# Patient Record
Sex: MALE | Race: BLACK OR AFRICAN AMERICAN | NOT HISPANIC OR LATINO | Employment: FULL TIME | URBAN - METROPOLITAN AREA
[De-identification: names, ages, dates, MRNs, and addresses within clinical notes are randomized per-mention and may not be internally consistent; named-entity substitution may affect disease eponyms.]

---

## 2017-07-10 VITALS — BODY MASS INDEX: 51.98 KG/M2 | HEIGHT: 65 IN | WEIGHT: 312 LBS

## 2017-07-10 RX ORDER — VALSARTAN 320 MG/1
1 TABLET ORAL DAILY
COMMUNITY
Start: 2016-10-13 | End: 2017-08-31 | Stop reason: SDUPTHER

## 2017-07-10 RX ORDER — ACETAMINOPHEN 500 MG
2 TABLET ORAL DAILY
COMMUNITY
Start: 2016-10-13 | End: 2018-12-13 | Stop reason: ALTCHOICE

## 2017-07-10 RX ORDER — AMLODIPINE BESYLATE 5 MG/1
1 TABLET ORAL DAILY
COMMUNITY
Start: 2016-10-13 | End: 2017-08-31 | Stop reason: SDUPTHER

## 2017-07-10 RX ORDER — TADALAFIL 5 MG/1
1 TABLET ORAL DAILY
COMMUNITY
Start: 2015-04-08 | End: 2018-03-17 | Stop reason: SDUPTHER

## 2017-07-10 RX ORDER — ALLOPURINOL 300 MG/1
1 TABLET ORAL DAILY
COMMUNITY
Start: 2016-10-13 | End: 2017-07-12 | Stop reason: SDUPTHER

## 2017-07-10 RX ORDER — ATORVASTATIN CALCIUM 20 MG/1
1 TABLET, FILM COATED ORAL NIGHTLY
COMMUNITY
Start: 2016-10-13 | End: 2017-08-31 | Stop reason: SDUPTHER

## 2017-07-12 DIAGNOSIS — M10.9 GOUT, UNSPECIFIED CAUSE, UNSPECIFIED CHRONICITY, UNSPECIFIED SITE: Primary | ICD-10-CM

## 2017-07-12 RX ORDER — ALLOPURINOL 300 MG/1
300 TABLET ORAL DAILY
Qty: 30 TABLET | Refills: 5 | Status: SHIPPED | OUTPATIENT
Start: 2017-07-12 | End: 2018-02-20 | Stop reason: SDUPTHER

## 2017-08-31 ENCOUNTER — OFFICE VISIT (OUTPATIENT)
Dept: FAMILY MEDICINE | Facility: CLINIC | Age: 47
End: 2017-08-31
Payer: COMMERCIAL

## 2017-08-31 VITALS
WEIGHT: 311 LBS | BODY MASS INDEX: 51.82 KG/M2 | DIASTOLIC BLOOD PRESSURE: 82 MMHG | HEIGHT: 65 IN | OXYGEN SATURATION: 97 % | HEART RATE: 98 BPM | SYSTOLIC BLOOD PRESSURE: 130 MMHG

## 2017-08-31 DIAGNOSIS — E78.01 FAMILIAL HYPERCHOLESTEROLEMIA: ICD-10-CM

## 2017-08-31 DIAGNOSIS — G47.9 SLEEP DISORDER: ICD-10-CM

## 2017-08-31 DIAGNOSIS — Z23 NEED FOR TETANUS BOOSTER: ICD-10-CM

## 2017-08-31 DIAGNOSIS — I10 ESSENTIAL HYPERTENSION: ICD-10-CM

## 2017-08-31 PROBLEM — E78.5 HYPERLIPIDEMIA: Status: ACTIVE | Noted: 2017-08-31

## 2017-08-31 PROCEDURE — 90715 TDAP VACCINE 7 YRS/> IM: CPT | Mod: ,,, | Performed by: FAMILY MEDICINE

## 2017-08-31 PROCEDURE — 90471 IMMUNIZATION ADMIN: CPT | Mod: ,,, | Performed by: FAMILY MEDICINE

## 2017-08-31 PROCEDURE — 3008F BODY MASS INDEX DOCD: CPT | Mod: ,,, | Performed by: FAMILY MEDICINE

## 2017-08-31 PROCEDURE — 99214 OFFICE O/P EST MOD 30 MIN: CPT | Mod: 25,,, | Performed by: FAMILY MEDICINE

## 2017-08-31 RX ORDER — AMLODIPINE BESYLATE 5 MG/1
5 TABLET ORAL DAILY
Qty: 30 TABLET | Refills: 6 | Status: SHIPPED | OUTPATIENT
Start: 2017-08-31 | End: 2018-03-08 | Stop reason: SDUPTHER

## 2017-08-31 RX ORDER — VALSARTAN 320 MG/1
320 TABLET ORAL DAILY
Qty: 30 TABLET | Refills: 6 | Status: SHIPPED | OUTPATIENT
Start: 2017-08-31 | End: 2018-03-08 | Stop reason: SDUPTHER

## 2017-08-31 RX ORDER — ATORVASTATIN CALCIUM 20 MG/1
20 TABLET, FILM COATED ORAL NIGHTLY
Qty: 30 TABLET | Refills: 6 | Status: SHIPPED | OUTPATIENT
Start: 2017-08-31 | End: 2018-03-08 | Stop reason: SDUPTHER

## 2017-08-31 NOTE — PROGRESS NOTES
Subjective:       Patient ID: Hi Andres Jr. is a 46 y.o. male.    Chief Complaint: Hypertension    Hypertension   This is a chronic problem. The current episode started more than 1 year ago. The problem is unchanged. The problem is controlled. Pertinent negatives include no blurred vision, chest pain, headaches, malaise/fatigue or shortness of breath. There are no associated agents to hypertension. Risk factors for coronary artery disease include dyslipidemia, male gender and obesity. Past treatments include angiotensin blockers. The current treatment provides mild improvement. There are no compliance problems.      Review of Systems   Constitutional: Negative for activity change, appetite change, fever and malaise/fatigue.   HENT: Negative for congestion and sore throat.    Eyes: Negative for blurred vision and visual disturbance.   Respiratory: Positive for apnea (heavy snoring witnessed, gasping). Negative for cough, choking, chest tightness and shortness of breath.    Cardiovascular: Negative for chest pain.   Gastrointestinal: Negative for abdominal pain, constipation, diarrhea and nausea.   Genitourinary: Negative for difficulty urinating.   Musculoskeletal: Negative for back pain and myalgias.   Neurological: Negative for headaches.   Hematological: Negative for adenopathy.   Psychiatric/Behavioral: Negative for suicidal ideas.       Past Medical History:   Diagnosis Date    Anemia     Arthritis     Hyperlipidemia     Hypertension     Vitamin D deficiency       Past Surgical History:   Procedure Laterality Date    HERNIA REPAIR         Family History   Problem Relation Age of Onset    Arthritis Father     Hypertension Father        Social History     Social History    Marital status:      Spouse name: N/A    Number of children: N/A    Years of education: N/A     Social History Main Topics    Smoking status: Never Smoker    Smokeless tobacco: Never Used    Alcohol use Yes       "Comment: monthly    Drug use: No    Sexual activity: Yes     Other Topics Concern    None     Social History Narrative    None       Current Outpatient Prescriptions   Medication Sig Dispense Refill    allopurinol (ZYLOPRIM) 300 MG tablet Take 1 tablet (300 mg total) by mouth once daily. 30 tablet 5    amlodipine (NORVASC) 5 MG tablet Take 1 tablet (5 mg total) by mouth Daily. 30 tablet 6    atorvastatin (LIPITOR) 20 MG tablet Take 1 tablet (20 mg total) by mouth nightly. 30 tablet 6    cholecalciferol, vitamin D3, 2,000 unit Cap Take 2 capsules by mouth once daily.      omega-3 fatty acids-fish oil (FISH OIL EXTRA STRENGTH) 435-880 mg Cap Take 1 capsule by mouth once daily.      tadalafil (CIALIS) 5 MG tablet Take 1 tablet by mouth once daily.      valsartan (DIOVAN) 320 MG tablet Take 1 tablet (320 mg total) by mouth Daily. Take with food 30 tablet 6     No current facility-administered medications for this visit.        Review of patient's allergies indicates:  No Known Allergies  Objective:      Blood pressure 130/82, pulse 98, height 5' 5" (1.651 m), weight (!) 141.1 kg (311 lb), SpO2 97 %. Body mass index is 51.75 kg/m².   Physical Exam   Constitutional: He is oriented to person, place, and time. He appears well-developed and well-nourished.   HENT:   Head: Atraumatic.   Eyes: EOM are normal. Pupils are equal, round, and reactive to light. Right pupil is round. Left pupil is round.   Neck: Normal range of motion. Neck supple. No thyromegaly present.   Cardiovascular: Normal rate and regular rhythm.    No murmur heard.  Pulmonary/Chest: Effort normal and breath sounds normal. No respiratory distress.   Abdominal: Soft. Bowel sounds are normal. There is no hepatosplenomegaly. There is no tenderness.   Musculoskeletal: Normal range of motion. He exhibits no edema.   Lymphadenopathy:     He has no cervical adenopathy.        Right: No supraclavicular adenopathy present.        Left: No supraclavicular " adenopathy present.   Neurological: He is alert and oriented to person, place, and time. He has normal strength. No cranial nerve deficit or sensory deficit.   Skin: Skin is warm and dry.   Psychiatric: He has a normal mood and affect. His behavior is normal. Judgment and thought content normal. He expresses no suicidal plans.           Assessment:       1. Essential hypertension    2. Familial hypercholesterolemia    3. Sleep disorder    4. Need for tetanus booster        Plan:       Hi was seen today for hypertension.    Diagnoses and all orders for this visit:    Essential hypertension - stable  -     TSH; Future  -     TSH  -     amlodipine (NORVASC) 5 MG tablet; Take 1 tablet (5 mg total) by mouth Daily.  -     valsartan (DIOVAN) 320 MG tablet; Take 1 tablet (320 mg total) by mouth Daily. Take with food    Familial hypercholesterolemia  -     Comprehensive metabolic panel; Future  -     Lipid panel; Future  -     Comprehensive metabolic panel  -     Lipid panel  -     atorvastatin (LIPITOR) 20 MG tablet; Take 1 tablet (20 mg total) by mouth nightly.    Sleep disorder  -     Ambulatory referral to Pulmonology    Need for tetanus booster  -     Tdap Vaccine

## 2017-08-31 NOTE — PATIENT INSTRUCTIONS
Heart Disease Education    The heart beats 60 to 100 times per minute, 24 hours a day. This equals almost 1000,000 times a day. It pumps blood with oxygen and nutrients to the tissues and organs of the body. But the heart is a muscle and needs its own supply of blood. Blood flow to the heart is supplied by the coronary arteries. Coronary artery disease (atherosclerosis) is a result of cholesterol, saturated fat, and calcium deposits (plaques) that build up inside the walls. This causes inflammation within the coronary arteries. These plaques narrow the artery and reduce blood flow to the heart muscle. The reduction in blood flow to the heart muscle decreases oxygen supply to the heart. If the narrowing is significant enough, the oxygen supply to one or more regions of the heart can be temporarily or permanently shut down. This can cause chest pain, and possibly death of heart tissue (heart attack).  Types of chest pain  Angina is the name for pain in the heart muscle. Angina is a warning sign of serious heart disease. When untreated it can lead to a heart attack, also known as acute myocardial infarction, or AMI. Angina occurs when there is not enough blood and oxygen flowing to the heart for the amount of work it is doing. This most often happens during physical exertion, when the heart is working hardest. It is usually relieved by rest or nitroglycerin. Angina may also occur after a large meal when extra blood is sent to the digestive organs and less goes to the heart. In the case of advanced or unstable heart disease, angina can occur at rest or awaken you from sleep. Angina usually lasts from a few minutes up to 20 minutes or more. When treated early, the effects of angina can be reversed without permanent damage to the heart. Angina is a serious condition and needs to be evaluated by a medical professional immediately.  There are two types of angina -- stable and unstable:  · Stable angina usually occurs  with a predictable level of activity. Being stable, its character, severity, and occurrence do not change much over time. It usually starts with activity, and resolves with rest or taking your medicine as instructed by your doctor. The symptoms usually do not last long.  · Unstable angina changes or gets worse over time. It is different from whatever you are used to. It may feel different or worse, begin without cause, occur with exercise or exertion, wake you up from sleep, and last longer. It may not respond in the same way as it does when you take your usual medicines for an attack. This type of angina can be a warning sign of an impending heart attack.     A heart attack is usually the result of a blood clot that suddenly forms in a coronary artery that has been narrowed with plaque. When this occurs, blood flow may be cut off to a part of the heart muscle, causing the cells to die. This weakens the pumping action of the heart, which affects the delivery of blood to all the other organs in the body including the brain. This damage is not reversible. However, early treatment can limit the amount of damage.  The pain you feel with angina and a heart attack may have a similar quality. However, it is usually different in intensity and duration. Here are some typical descriptions of a heart attack:  · It is most often experienced as a squeezing, crushing, pressure-like sensation in the center of the chest.  · It is sometimes described as something heavy sitting on my chest.  · It may feel more like a bad case of indigestion.  · The pain may spread from the chest to the arm, shoulder, throat or jaw.  · Sometimes the pain is not felt in the chest at all, but only in the arm, shoulder, throat or jaw.  · There may also be nausea, vomiting, dizziness or light-headedness, sweating and trouble breathing.  · Palpitations, or your heart beating rapidly  · A new, irregular heart beat  · Unexplained weakness  You may not be  "able to tell the difference between "bad" angina and a heart attack at home. Seek help if your symptoms are different than usual. Do not be in denial or just try to "tough it out."  Call 911  This is the fastest and safest way to get to the emergency department. The paramedics can also start treatment on the way to the hospital, saving valuable time for your heart.  · If the angina gets worse, if it continues, or if it stops and returns, call 911 immediately. Do not delay. You may be having a heart attack.  · After you call 911, take a second tablet or spray unless instructed otherwise. When repeating doses, sit down if possible, because it can make you feel lightheaded or dizzy. Wait another 5 minutes. If the angina still does not go away, take a third tablet or spray. Do not take more than 3 tablets or sprays within 15 minutes. Stay on the phone with 911 for further instruction.  · Your healthcare provider may give you slightly different instructions than those above. If so, follow them carefully.  Do not wait until symptoms become severe to call 911.  Other reasons to call 911 include:  · Trouble breathing  · Feeling lightheaded, faint, or dizzy  · Rapid heart beat  · Slower than usual heart rate compared to your normal  · Angina with weakness, dizziness, fainting, heavy sweating, nausea, or vomiting  · Extreme drowsiness, confusion  · Weakness of an arm or leg or one side of the face  · Difficulty with speech or vision  When to seek medical care  Remember, the signs and symptoms of a heart attack are not always like they are on TV. Sometimes they are not so obvious. You may only feel weak, or just not right. If it is not clear or if you have any doubt, call for advice.  · Seek help if there is a change in the type of pain, if it feels different, or if your symptoms are mild.  · Do not drive yourself. Have someone else drive you. If no one can drive, call 911.  · Do not delay. Fast diagnosis and treatment can " "prevent or limit the amount of heart damage during a heart attack.  · Do not go to your doctor's office or a clinic as they may not be able to provide all the testing and treatment required for this condition.  · If your doctor has given you medicine to take when symptoms occur, take them but don't delay getting help trying to locate medicines.  What happens in the emergency department  The emergency department is connected to your local emergency medical system (EMS) through 911. That's why during a cardiac emergency, calling 911 is the fastest way to get help. The goal of the emergency department is to rapidly screen, evaluate, and treat people.  Once you are there, an electrocardiogram (ECG or heart tracing) will be done. Blood samples may be taken to look for the presence of heart enzymes that leak from damaged heart cells and show if a heart attack is occurring. You will often be evaluated by a heart specialist (cardiologist) who decides the best course of action. In the case of severe angina or early heart attack, and depending on the circumstances, powerful "clot busting" medicines can be used to dissolve blood clots in the coronary artery. In other cases, you may be taken to a cardiac catheterization lab. Here, a tiny balloon-tipped catheter is advanced through blood vessels to the heart. There the balloon is inflated pushing open the blood vessel restoring blood flow.  Risk factors for heart disease  Risk factors for heart disease are a combination of genetic and lifestyle. Many risk factors work by either directly or indirectly damaging the blood vessels of the heart, or by increasing the risk of forming blood or cholesterol clots, which then clog up and block the arteries.     Examples of physical lifestyle risk factors:  · Cigarette smoking  · High blood pressure  · High blood cholesterol  · Use of stimulant drugs such as cocaine, crack, and amphetamines  · Eating a high-fat, high-cholesterol " meal  · Diabetes   · Obesity which increases risk for diabetes and high blood pressure  · Lack of regular physical activity     Examples of emotional lifestyle factors:  · Chronic high stress levels release stress hormones. These raise blood pressure and cholesterol level and makes blood clot more easily.  · Held-in anger, hostile or cynical attitude  · Social and emotional isolation, lack of intimacy  · Loss of relationship  · Depression  Other factors that increase the risk of heart attack that you cannot control :  · Age. The older you get beyond 40, the greater is your risk of significant coronary artery disease.  · Gender. More men than women get heart disease; but once past menopause, women who are not taking estrogen replacement have the same risk as men for a heart attack.  · Family history. If your mother, father, brother or sister has coronary artery disease, your risk of having it is higher than a person your age without this family history.  What can you do to decrease your risk  To reduce your risk of heart disease:  · Get regular checkups with your doctor.  · Take your medicines for blood pressure, cholesterol or diabetes as directed.  · Watch your diet. Eat a heart healthy diet choosing fresh foods, less salt, cholesterol, and fat  · Stop smoking. Get help if needed.  · Get regular exercise.  · Manage stress.  · Carry a list of medicines and doses in your wallet.  Date Last Reviewed: 12/30/2015  © 8145-5903 [employer]. [address] [place] [address], [place] [address]. All rights reserved. This information is not intended as a substitute for professional medical care. Always follow your healthcare professional's instructions.

## 2018-02-20 DIAGNOSIS — M10.9 GOUT, UNSPECIFIED CAUSE, UNSPECIFIED CHRONICITY, UNSPECIFIED SITE: ICD-10-CM

## 2018-02-20 RX ORDER — ALLOPURINOL 300 MG/1
TABLET ORAL
Qty: 30 TABLET | Refills: 5 | Status: SHIPPED | OUTPATIENT
Start: 2018-02-20 | End: 2018-09-26 | Stop reason: SDUPTHER

## 2018-02-22 ENCOUNTER — TELEPHONE (OUTPATIENT)
Dept: FAMILY MEDICINE | Facility: CLINIC | Age: 48
End: 2018-02-22

## 2018-02-22 DIAGNOSIS — E78.5 HYPERLIPIDEMIA, UNSPECIFIED HYPERLIPIDEMIA TYPE: Primary | ICD-10-CM

## 2018-02-22 NOTE — TELEPHONE ENCOUNTER
----- Message from Nirali Victoria sent at 2018  9:52 AM CST -----  Regarding: lab order request  Contact: [phone]  Patient has appointment on 18 and is requesting lab order request sent to [facility].  1969.  Thank you!

## 2018-03-07 ENCOUNTER — TELEPHONE (OUTPATIENT)
Dept: FAMILY MEDICINE | Facility: CLINIC | Age: 48
End: 2018-03-07

## 2018-03-07 LAB
ALBUMIN SERPL-MCNC: 4.3 G/DL (ref 3.5–5.5)
ALBUMIN/GLOB SERPL: 1.4 {RATIO} (ref 1.2–2.2)
ALP SERPL-CCNC: 115 IU/L (ref 39–117)
ALT SERPL-CCNC: 16 IU/L (ref 0–44)
AST SERPL-CCNC: 16 IU/L (ref 0–40)
BILIRUB SERPL-MCNC: 1 MG/DL (ref 0–1.2)
BUN SERPL-MCNC: 14 MG/DL (ref 6–24)
BUN/CREAT SERPL: 15 (ref 9–20)
CALCIUM SERPL-MCNC: 9.2 MG/DL (ref 8.7–10.2)
CHLORIDE SERPL-SCNC: 101 MMOL/L (ref 96–106)
CHOLEST SERPL-MCNC: 131 MG/DL (ref 100–199)
CO2 SERPL-SCNC: 23 MMOL/L (ref 18–29)
CREAT SERPL-MCNC: 0.94 MG/DL (ref 0.76–1.27)
GFR SERPLBLD CREATININE-BSD FMLA CKD-EPI: 110 ML/MIN/1.73
GFR SERPLBLD CREATININE-BSD FMLA CKD-EPI: 95 ML/MIN/1.73
GLOBULIN SER CALC-MCNC: 3 G/DL (ref 1.5–4.5)
GLUCOSE SERPL-MCNC: 88 MG/DL (ref 65–99)
HDLC SERPL-MCNC: 35 MG/DL
LDLC SERPL CALC-MCNC: 75 MG/DL (ref 0–99)
POTASSIUM SERPL-SCNC: 4.3 MMOL/L (ref 3.5–5.2)
PROT SERPL-MCNC: 7.3 G/DL (ref 6–8.5)
SODIUM SERPL-SCNC: 143 MMOL/L (ref 134–144)
TRIGL SERPL-MCNC: 104 MG/DL (ref 0–149)
VLDLC SERPL CALC-MCNC: 21 MG/DL (ref 5–40)

## 2018-03-08 ENCOUNTER — OFFICE VISIT (OUTPATIENT)
Dept: FAMILY MEDICINE | Facility: CLINIC | Age: 48
End: 2018-03-08
Payer: COMMERCIAL

## 2018-03-08 VITALS
HEIGHT: 65 IN | HEART RATE: 90 BPM | SYSTOLIC BLOOD PRESSURE: 150 MMHG | DIASTOLIC BLOOD PRESSURE: 96 MMHG | WEIGHT: 299 LBS | BODY MASS INDEX: 49.81 KG/M2 | OXYGEN SATURATION: 98 %

## 2018-03-08 DIAGNOSIS — E78.01 FAMILIAL HYPERCHOLESTEROLEMIA: ICD-10-CM

## 2018-03-08 DIAGNOSIS — I10 ESSENTIAL HYPERTENSION: Primary | ICD-10-CM

## 2018-03-08 PROBLEM — Z23 NEED FOR TETANUS BOOSTER: Status: RESOLVED | Noted: 2017-08-31 | Resolved: 2018-03-08

## 2018-03-08 PROCEDURE — 3080F DIAST BP >= 90 MM HG: CPT | Mod: ,,, | Performed by: FAMILY MEDICINE

## 2018-03-08 PROCEDURE — 99213 OFFICE O/P EST LOW 20 MIN: CPT | Mod: ,,, | Performed by: FAMILY MEDICINE

## 2018-03-08 PROCEDURE — 3077F SYST BP >= 140 MM HG: CPT | Mod: ,,, | Performed by: FAMILY MEDICINE

## 2018-03-08 RX ORDER — VALSARTAN 320 MG/1
320 TABLET ORAL DAILY
Qty: 30 TABLET | Refills: 6 | Status: SHIPPED | OUTPATIENT
Start: 2018-03-08 | End: 2018-11-14 | Stop reason: SDUPTHER

## 2018-03-08 RX ORDER — ATORVASTATIN CALCIUM 20 MG/1
20 TABLET, FILM COATED ORAL NIGHTLY
Qty: 30 TABLET | Refills: 6 | Status: SHIPPED | OUTPATIENT
Start: 2018-03-08 | End: 2018-11-14 | Stop reason: SDUPTHER

## 2018-03-08 NOTE — PATIENT INSTRUCTIONS
Taking Your Blood Pressure  Blood pressure is the force of blood against the artery wall as it moves from the heart through the blood vessels. You can take your own blood pressure reading using a digital monitor. Take your readings the same each time, using the same arm. Take readings as often as your healthcare provider instructs.  About blood pressure monitors  Blood pressure monitors are designed for certain ages and cases. You can find monitors for older adults, for pregnant women, and for children. Make sure the one you choose is the right one for your age and situation.  The American Heart Association recommends an automatic cuff monitor that fits on your upper arm (bicep). The cuff should fit your arm size. A cuff thats too large or too small will not give an accurate reading. Measure around your upper arm to find your size.  Monitors that attach to your finger or wrist are not as accurate as monitors for your upper arm.  Ask your healthcare provider for help in choosing a monitor. Bring your monitor to your next provider visit if you need help in using it the correct way.  The steps below are general instructions for using an automatic digital monitor.  Step 1. Relax    · Take your blood pressure at the same time every day, such as in the morning or evening, or at the time your healthcare provider recommends.  · Wait at least a half-hour after smoking, eating, or exercising. Don't drink coffee, tea, soda, or other caffeinated beverages before checking your blood pressure.  · Sit comfortably at a table with both feet on the floor. Do not cross your legs or feet. Place the monitor near you.  · Rest for a few minutes before you begin.  Step 2. Wrap the cuff    · Place your arm on the table, palm up. Your arm should be at the level of your heart. Wrap the cuff around your upper arm, just above your elbow. Its best done on bare skin, not over clothing. Most cuffs will indicate where the brachial artery (the  blood vessel in the middle of the arm at the inner side of the elbow) should line up with the cuff. Look in your monitor's instruction booklet for an illustration. You can also bring your cuff to your healthcare provider and have them show you how to correctly place the cuff.  Step 3. Inflate the cuff    · Push the button that starts the pump.  · The cuff will tighten, then loosen.  · The numbers will change. When they stop changing, your blood pressure reading will appear.  · Take 2 or 3 readings one minute apart.  Step 4. Write down the results of each reading    · Write down your blood pressure numbers for each reading. Note the date and time. Keep your results in one place, such as a notebook. Even if your monitor has a built-in memory, keep a hard copy of the readings.  · Remove the cuff from your arm. Turn off the machine.  · Bring your blood pressure records with your healthcare providers at each visit.  · If you start a new blood pressure medicine, note the day you started the new medicine. Also note the day if you change the dose of your medicine. This information goes on your blood pressure recording sheet. This will help your healthcare provider monitor how well the medicine changes are working.  · Ask your healthcare provider what numbers should prompt you to call him or her. Also ask what numbers should prompt you to get help right away.  Date Last Reviewed: 11/1/2016  © 6640-7827 [employer]. [address] [place] [address], [place] [address]. All rights reserved. This information is not intended as a substitute for professional medical care. Always follow your healthcare professional's instructions.

## 2018-03-08 NOTE — PROGRESS NOTES
Subjective:       Patient ID: Hi Andres Jr. is a 47 y.o. male.    Chief Complaint: Hypertension (discuss lab results)    Hypertension   This is a chronic problem. The current episode started more than 1 year ago. The problem is unchanged. The problem is uncontrolled. Pertinent negatives include no anxiety, chest pain, headaches, malaise/fatigue, peripheral edema, PND or shortness of breath. There are no associated agents to hypertension. Risk factors for coronary artery disease include male gender, dyslipidemia, family history, obesity and sedentary lifestyle.     Review of Systems   Constitutional: Negative for activity change, appetite change, fever and malaise/fatigue.   HENT: Negative for congestion and sore throat.    Eyes: Negative for visual disturbance.   Respiratory: Negative for cough, chest tightness and shortness of breath.    Cardiovascular: Negative for chest pain and PND.   Gastrointestinal: Negative for abdominal pain, constipation, diarrhea and nausea.   Genitourinary: Negative for difficulty urinating.   Musculoskeletal: Negative for back pain and myalgias.   Neurological: Negative for headaches.   Hematological: Negative for adenopathy.   Psychiatric/Behavioral: Negative for suicidal ideas.       Past Medical History:   Diagnosis Date    Anemia     Arthritis     Hyperlipidemia     Hypertension     Vitamin D deficiency       Past Surgical History:   Procedure Laterality Date    HERNIA REPAIR         Family History   Problem Relation Age of Onset    Arthritis Father     Hypertension Father        Social History     Social History    Marital status:      Spouse name: N/A    Number of children: N/A    Years of education: N/A     Social History Main Topics    Smoking status: Never Smoker    Smokeless tobacco: Never Used    Alcohol use Yes      Comment: monthly    Drug use: No    Sexual activity: Yes     Other Topics Concern    None     Social History Narrative    None  "      Current Outpatient Prescriptions   Medication Sig Dispense Refill    allopurinol (ZYLOPRIM) 300 MG tablet TAKE ONE TABLET BY MOUTH ONCE DAILY 30 tablet 5    amLODIPine (NORVASC) 10 MG Tab Take 1 tablet (10 mg total) by mouth Daily. 30 tablet 6    atorvastatin (LIPITOR) 20 MG tablet Take 1 tablet (20 mg total) by mouth nightly. 30 tablet 6    cholecalciferol, vitamin D3, 2,000 unit Cap Take 2 capsules by mouth once daily.      omega-3 fatty acids-fish oil (FISH OIL EXTRA STRENGTH) 435-880 mg Cap Take 1 capsule by mouth once daily.      tadalafil (CIALIS) 5 MG tablet Take 1 tablet by mouth once daily.      valsartan (DIOVAN) 320 MG tablet Take 1 tablet (320 mg total) by mouth Daily. Take with food 30 tablet 6     No current facility-administered medications for this visit.        Review of patient's allergies indicates:  No Known Allergies  Objective:    HPI     Hypertension    Additional comments: discuss lab results       Last edited by Niru Cole MA on 3/8/2018  9:34 AM. (History)      Blood pressure (!) 150/96, pulse 90, height 5' 5" (1.651 m), weight 135.6 kg (299 lb), SpO2 98 %. Body mass index is 49.76 kg/m².   Physical Exam   Constitutional: He is oriented to person, place, and time. He appears well-developed and well-nourished.   HENT:   Head: Atraumatic.   Eyes: EOM are normal. Pupils are equal, round, and reactive to light. Right pupil is round. Left pupil is round.   Neck: Normal range of motion. Neck supple. No thyromegaly present.   Cardiovascular: Normal rate and regular rhythm.    No murmur heard.  Pulmonary/Chest: Effort normal and breath sounds normal. No respiratory distress.   Abdominal: There is no hepatosplenomegaly. There is no tenderness.   Musculoskeletal: Normal range of motion. He exhibits no edema.   Lymphadenopathy:     He has no cervical adenopathy.        Right: No supraclavicular adenopathy present.        Left: No supraclavicular adenopathy present.   Neurological: He " is alert and oriented to person, place, and time. He has normal strength. No cranial nerve deficit or sensory deficit.   Skin: Skin is warm and dry.   Psychiatric: He has a normal mood and affect. His behavior is normal. Judgment and thought content normal. He expresses no suicidal plans.           Assessment:       1. Essential hypertension    2. Familial hypercholesterolemia        Plan:       Hi was seen today for hypertension.    Diagnoses and all orders for this visit:    Essential hypertension  -     amLODIPine (NORVASC) 10 MG Tab; Take 1 tablet (10 mg total) by mouth Daily.  -     valsartan (DIOVAN) 320 MG tablet; Take 1 tablet (320 mg total) by mouth Daily. Take with food    Familial hypercholesterolemia  -     atorvastatin (LIPITOR) 20 MG tablet; Take 1 tablet (20 mg total) by mouth nightly.    Andreas 2m

## 2018-03-19 RX ORDER — TADALAFIL 5 MG/1
TABLET, FILM COATED ORAL
Qty: 15 TABLET | Refills: 13 | Status: SHIPPED | OUTPATIENT
Start: 2018-03-19 | End: 2020-10-09 | Stop reason: SDUPTHER

## 2018-05-10 ENCOUNTER — OFFICE VISIT (OUTPATIENT)
Dept: FAMILY MEDICINE | Facility: CLINIC | Age: 48
End: 2018-05-10
Payer: COMMERCIAL

## 2018-05-10 VITALS
DIASTOLIC BLOOD PRESSURE: 80 MMHG | OXYGEN SATURATION: 97 % | BODY MASS INDEX: 49.81 KG/M2 | HEART RATE: 127 BPM | WEIGHT: 299 LBS | HEIGHT: 65 IN | SYSTOLIC BLOOD PRESSURE: 139 MMHG

## 2018-05-10 DIAGNOSIS — E66.01 CLASS 3 SEVERE OBESITY DUE TO EXCESS CALORIES WITH SERIOUS COMORBIDITY AND BODY MASS INDEX (BMI) OF 45.0 TO 49.9 IN ADULT: ICD-10-CM

## 2018-05-10 DIAGNOSIS — E78.01 FAMILIAL HYPERCHOLESTEROLEMIA: ICD-10-CM

## 2018-05-10 DIAGNOSIS — I10 ESSENTIAL HYPERTENSION: Primary | ICD-10-CM

## 2018-05-10 PROCEDURE — 3008F BODY MASS INDEX DOCD: CPT | Mod: ,,, | Performed by: FAMILY MEDICINE

## 2018-05-10 PROCEDURE — 3079F DIAST BP 80-89 MM HG: CPT | Mod: ,,, | Performed by: FAMILY MEDICINE

## 2018-05-10 PROCEDURE — 99213 OFFICE O/P EST LOW 20 MIN: CPT | Mod: ,,, | Performed by: FAMILY MEDICINE

## 2018-05-10 PROCEDURE — 3075F SYST BP GE 130 - 139MM HG: CPT | Mod: ,,, | Performed by: FAMILY MEDICINE

## 2018-05-10 NOTE — PROGRESS NOTES
Subjective:       Patient ID: Hi Andres Jr. is a 47 y.o. male.    Chief Complaint: Hypertension    Hypertension   This is a chronic problem. The current episode started more than 1 year ago. The problem is unchanged. The problem is controlled. Pertinent negatives include no anxiety, chest pain, headaches or shortness of breath. There are no associated agents to hypertension.     Review of Systems   Constitutional: Negative for activity change, appetite change and fever.   HENT: Negative for congestion and sore throat.    Eyes: Negative for visual disturbance.   Respiratory: Negative for cough, chest tightness and shortness of breath.    Cardiovascular: Negative for chest pain.   Gastrointestinal: Negative for abdominal pain, constipation, diarrhea and nausea.   Genitourinary: Negative for difficulty urinating.   Musculoskeletal: Negative for back pain and myalgias.   Neurological: Negative for headaches.   Hematological: Negative for adenopathy.   Psychiatric/Behavioral: Negative for suicidal ideas.       Past Medical History:   Diagnosis Date    Anemia     Arthritis     Hyperlipidemia     Hypertension     Vitamin D deficiency       Past Surgical History:   Procedure Laterality Date    HERNIA REPAIR         Family History   Problem Relation Age of Onset    Arthritis Father     Hypertension Father        Social History     Social History    Marital status:      Spouse name: N/A    Number of children: N/A    Years of education: N/A     Social History Main Topics    Smoking status: Never Smoker    Smokeless tobacco: Never Used    Alcohol use Yes      Comment: monthly    Drug use: No    Sexual activity: Yes     Other Topics Concern    None     Social History Narrative    None       Current Outpatient Prescriptions   Medication Sig Dispense Refill    allopurinol (ZYLOPRIM) 300 MG tablet TAKE ONE TABLET BY MOUTH ONCE DAILY 30 tablet 5    amLODIPine (NORVASC) 10 MG Tab Take 1 tablet (10  "mg total) by mouth Daily. 30 tablet 6    atorvastatin (LIPITOR) 20 MG tablet Take 1 tablet (20 mg total) by mouth nightly. 30 tablet 6    cholecalciferol, vitamin D3, 2,000 unit Cap Take 2 capsules by mouth once daily.      CIALIS 5 mg tablet TAKE ONE TABLET BY MOUTH ONCE DAILY 15 tablet 13    omega-3 fatty acids-fish oil (FISH OIL EXTRA STRENGTH) 435-880 mg Cap Take 1 capsule by mouth once daily.      valsartan (DIOVAN) 320 MG tablet Take 1 tablet (320 mg total) by mouth Daily. Take with food 30 tablet 6     No current facility-administered medications for this visit.        Review of patient's allergies indicates:  No Known Allergies  Objective:      Blood pressure 139/80, pulse (!) 127, height 5' 5" (1.651 m), weight 135.6 kg (299 lb), SpO2 97 %. Body mass index is 49.76 kg/m².   Physical Exam   Constitutional: He is oriented to person, place, and time. He appears well-developed and well-nourished.   HENT:   Head: Atraumatic.   Eyes: EOM are normal. Pupils are equal, round, and reactive to light. Right pupil is round. Left pupil is round.   Neck: Normal range of motion. Neck supple. No thyromegaly present.   Cardiovascular: Normal rate and regular rhythm.    No murmur heard.  Pulmonary/Chest: Effort normal and breath sounds normal. No respiratory distress.   Abdominal: There is no hepatosplenomegaly. There is no tenderness.   Musculoskeletal: Normal range of motion. He exhibits no edema.   Lymphadenopathy:     He has no cervical adenopathy.        Right: No supraclavicular adenopathy present.        Left: No supraclavicular adenopathy present.   Neurological: He is alert and oriented to person, place, and time. He has normal strength. No cranial nerve deficit or sensory deficit.   Skin: Skin is warm and dry.   Psychiatric: He has a normal mood and affect. His behavior is normal. Judgment and thought content normal. He expresses no suicidal plans.           Assessment:       1. Essential hypertension    2. " Familial hypercholesterolemia    3. Class 3 severe obesity due to excess calories with serious comorbidity and body mass index (BMI) of 45.0 to 49.9 in adult        Plan:       Hi was seen today for hypertension.    Diagnoses and all orders for this visit:    Essential hypertension  Comments:  stable    Familial hypercholesterolemia  Comments:  stable    The patient is asked to make an attempt to improve diet and exercise patterns to aid in medical management of this problem.  Andreas 6m

## 2018-09-26 DIAGNOSIS — M10.9 GOUT, UNSPECIFIED CAUSE, UNSPECIFIED CHRONICITY, UNSPECIFIED SITE: ICD-10-CM

## 2018-09-26 RX ORDER — ALLOPURINOL 300 MG/1
TABLET ORAL
Qty: 30 TABLET | Refills: 5 | Status: SHIPPED | OUTPATIENT
Start: 2018-09-26 | End: 2018-11-14 | Stop reason: SDUPTHER

## 2018-11-02 ENCOUNTER — TELEPHONE (OUTPATIENT)
Dept: FAMILY MEDICINE | Facility: CLINIC | Age: 48
End: 2018-11-02

## 2018-11-02 DIAGNOSIS — E78.5 HYPERLIPIDEMIA, UNSPECIFIED HYPERLIPIDEMIA TYPE: Primary | ICD-10-CM

## 2018-11-02 NOTE — TELEPHONE ENCOUNTER
----- Message from Niru Cole MA sent at 11/2/2018 11:18 AM CDT -----      ----- Message -----  From: Ayse Jaime  Sent: 11/2/2018  10:55 AM  To: Luis Camacho Staff    6m fu BP bw  [facility]

## 2018-11-13 LAB
ALBUMIN SERPL-MCNC: 4.2 G/DL (ref 3.5–5.5)
ALBUMIN/GLOB SERPL: 1.6 {RATIO} (ref 1.2–2.2)
ALP SERPL-CCNC: 111 IU/L (ref 39–117)
ALT SERPL-CCNC: 22 IU/L (ref 0–44)
AST SERPL-CCNC: 24 IU/L (ref 0–40)
BILIRUB SERPL-MCNC: 0.7 MG/DL (ref 0–1.2)
BUN SERPL-MCNC: 11 MG/DL (ref 6–24)
BUN/CREAT SERPL: 13 (ref 9–20)
CALCIUM SERPL-MCNC: 9.2 MG/DL (ref 8.7–10.2)
CHLORIDE SERPL-SCNC: 103 MMOL/L (ref 96–106)
CHOLEST SERPL-MCNC: 169 MG/DL (ref 100–199)
CO2 SERPL-SCNC: 21 MMOL/L (ref 20–29)
CREAT SERPL-MCNC: 0.88 MG/DL (ref 0.76–1.27)
EGFR IF AFRICAN AMERICAN: 117 ML/MIN/1.73
EST. GFR  (NON AFRICAN AMERICAN): 101 ML/MIN/1.73
GLOBULIN SER CALC-MCNC: 2.7 G/DL (ref 1.5–4.5)
GLUCOSE SERPL-MCNC: 85 MG/DL (ref 65–99)
HDLC SERPL-MCNC: 42 MG/DL
LDLC SERPL CALC-MCNC: 103 MG/DL (ref 0–99)
POTASSIUM SERPL-SCNC: 4.6 MMOL/L (ref 3.5–5.2)
PROT SERPL-MCNC: 6.9 G/DL (ref 6–8.5)
SODIUM SERPL-SCNC: 142 MMOL/L (ref 134–144)
TRIGL SERPL-MCNC: 119 MG/DL (ref 0–149)
VLDLC SERPL CALC-MCNC: 24 MG/DL (ref 5–40)

## 2018-11-14 ENCOUNTER — OFFICE VISIT (OUTPATIENT)
Dept: FAMILY MEDICINE | Facility: CLINIC | Age: 48
End: 2018-11-14
Payer: COMMERCIAL

## 2018-11-14 VITALS
OXYGEN SATURATION: 97 % | TEMPERATURE: 98 F | SYSTOLIC BLOOD PRESSURE: 150 MMHG | BODY MASS INDEX: 49.65 KG/M2 | HEIGHT: 65 IN | DIASTOLIC BLOOD PRESSURE: 92 MMHG | HEART RATE: 102 BPM | WEIGHT: 298 LBS

## 2018-11-14 DIAGNOSIS — I10 ESSENTIAL HYPERTENSION: Primary | ICD-10-CM

## 2018-11-14 DIAGNOSIS — E78.01 FAMILIAL HYPERCHOLESTEROLEMIA: ICD-10-CM

## 2018-11-14 DIAGNOSIS — D50.9 IRON DEFICIENCY ANEMIA, UNSPECIFIED IRON DEFICIENCY ANEMIA TYPE: ICD-10-CM

## 2018-11-14 DIAGNOSIS — M10.9 GOUT, UNSPECIFIED CAUSE, UNSPECIFIED CHRONICITY, UNSPECIFIED SITE: ICD-10-CM

## 2018-11-14 DIAGNOSIS — I10 ESSENTIAL HYPERTENSION: ICD-10-CM

## 2018-11-14 DIAGNOSIS — E78.5 HYPERLIPIDEMIA, UNSPECIFIED HYPERLIPIDEMIA TYPE: ICD-10-CM

## 2018-11-14 DIAGNOSIS — E55.9 VITAMIN D DEFICIENCY: ICD-10-CM

## 2018-11-14 PROBLEM — M19.90 OSTEOARTHRITIS: Status: ACTIVE | Noted: 2018-11-14

## 2018-11-14 PROBLEM — N52.9 ED (ERECTILE DYSFUNCTION) OF ORGANIC ORIGIN: Status: ACTIVE | Noted: 2018-11-14

## 2018-11-14 PROCEDURE — 3080F DIAST BP >= 90 MM HG: CPT | Mod: ,,, | Performed by: INTERNAL MEDICINE

## 2018-11-14 PROCEDURE — 99213 OFFICE O/P EST LOW 20 MIN: CPT | Mod: ,,, | Performed by: INTERNAL MEDICINE

## 2018-11-14 PROCEDURE — 3077F SYST BP >= 140 MM HG: CPT | Mod: ,,, | Performed by: INTERNAL MEDICINE

## 2018-11-14 PROCEDURE — 3008F BODY MASS INDEX DOCD: CPT | Mod: ,,, | Performed by: INTERNAL MEDICINE

## 2018-11-14 RX ORDER — AMLODIPINE BESYLATE 10 MG/1
10 TABLET ORAL NIGHTLY
Qty: 90 TABLET | Refills: 1 | Status: SHIPPED | OUTPATIENT
Start: 2018-11-14 | End: 2018-12-13 | Stop reason: SDUPTHER

## 2018-11-14 RX ORDER — METOPROLOL SUCCINATE 50 MG/1
50 TABLET, EXTENDED RELEASE ORAL DAILY
Qty: 90 TABLET | Refills: 1 | Status: SHIPPED | OUTPATIENT
Start: 2018-11-14 | End: 2019-06-17 | Stop reason: SDUPTHER

## 2018-11-14 RX ORDER — VALSARTAN 320 MG/1
320 TABLET ORAL DAILY
Qty: 90 TABLET | Refills: 1 | Status: SHIPPED | OUTPATIENT
Start: 2018-11-14 | End: 2019-08-28 | Stop reason: SDUPTHER

## 2018-11-14 RX ORDER — ATORVASTATIN CALCIUM 20 MG/1
20 TABLET, FILM COATED ORAL NIGHTLY
Qty: 90 TABLET | Refills: 1 | Status: SHIPPED | OUTPATIENT
Start: 2018-11-14 | End: 2019-06-04 | Stop reason: SDUPTHER

## 2018-11-14 RX ORDER — ALLOPURINOL 300 MG/1
300 TABLET ORAL DAILY
Qty: 90 TABLET | Refills: 1 | Status: SHIPPED | OUTPATIENT
Start: 2018-11-14 | End: 2019-08-28 | Stop reason: SDUPTHER

## 2018-11-14 NOTE — PROGRESS NOTES
Subjective:       Patient ID: Hi Andres is a 48 y.o. male.    Chief Complaint: Hypertension (continuity of care); Hyperlipidemia; and Follow-up (followup labs)    Hypertension   This is a chronic problem. The problem is uncontrolled. Pertinent negatives include no chest pain, headaches, neck pain, palpitations, peripheral edema or shortness of breath. Risk factors for coronary artery disease include dyslipidemia, obesity and male gender. Past treatments include angiotensin blockers and calcium channel blockers. There are no compliance problems (except hasn't taken meds yet this AM).  There is no history of angina, kidney disease, CAD/MI, CVA or heart failure.     Review of Systems   Constitutional: Negative for chills, fatigue, fever and unexpected weight change.   HENT: Negative for congestion, hearing loss, postnasal drip, rhinorrhea, trouble swallowing and voice change.    Eyes: Negative for photophobia and visual disturbance.   Respiratory: Positive for apnea. Negative for cough, choking, chest tightness, shortness of breath and wheezing.    Cardiovascular: Negative for chest pain, palpitations and leg swelling.   Gastrointestinal: Negative for abdominal pain, blood in stool, constipation, diarrhea, nausea, rectal pain and vomiting.   Endocrine: Positive for cold intolerance. Negative for heat intolerance, polydipsia and polyuria.   Genitourinary: Negative for decreased urine volume, difficulty urinating, discharge, dysuria, flank pain, frequency, genital sores, hematuria, testicular pain and urgency.   Musculoskeletal: Positive for arthralgias and joint swelling. Negative for back pain, gait problem, myalgias and neck pain.   Skin: Negative for color change, rash and wound.   Allergic/Immunologic: Negative for environmental allergies and food allergies.   Neurological: Negative for dizziness, tremors, seizures, syncope, facial asymmetry, speech difficulty, weakness, light-headedness, numbness and  headaches.   Hematological: Negative for adenopathy. Does not bruise/bleed easily.   Psychiatric/Behavioral: Negative for confusion, hallucinations, sleep disturbance and suicidal ideas. The patient is not nervous/anxious.        Past Medical History:   Diagnosis Date    Anemia     Arthritis     Gout     Hyperlipidemia     Hypertension     EVGENY on CPAP     Vitamin D deficiency       Past Surgical History:   Procedure Laterality Date    HERNIA REPAIR         Family History   Problem Relation Age of Onset    Arthritis Father     Hypertension Father     No Known Problems Mother     Prostate cancer Neg Hx     Colon cancer Neg Hx        Social History     Socioeconomic History    Marital status:      Spouse name: None    Number of children: None    Years of education: None    Highest education level: None   Social Needs    Financial resource strain: None    Food insecurity - worry: None    Food insecurity - inability: None    Transportation needs - medical: None    Transportation needs - non-medical: None   Occupational History    None   Tobacco Use    Smoking status: Never Smoker    Smokeless tobacco: Never Used   Substance and Sexual Activity    Alcohol use: Yes     Frequency: Monthly or less     Drinks per session: 1 or 2     Comment: monthly    Drug use: No    Sexual activity: Yes     Partners: Female   Other Topics Concern    None   Social History Narrative    Live with wife       Current Outpatient Medications   Medication Sig Dispense Refill    allopurinol (ZYLOPRIM) 300 MG tablet Take 1 tablet (300 mg total) by mouth once daily. 90 tablet 1    atorvastatin (LIPITOR) 20 MG tablet Take 1 tablet (20 mg total) by mouth nightly. 90 tablet 1    cholecalciferol, vitamin D3, 2,000 unit Cap Take 2 capsules by mouth once daily.      CIALIS 5 mg tablet TAKE ONE TABLET BY MOUTH ONCE DAILY 15 tablet 13    omega-3 fatty acids-fish oil (FISH OIL EXTRA STRENGTH) 435-880 mg Cap Take 1  "capsule by mouth once daily.      valsartan (DIOVAN) 320 MG tablet Take 1 tablet (320 mg total) by mouth Daily. Take with food 90 tablet 1    amLODIPine (NORVASC) 10 MG tablet Take 1 tablet (10 mg total) by mouth every evening. 90 tablet 1    metoprolol succinate (TOPROL-XL) 50 MG 24 hr tablet Take 1 tablet (50 mg total) by mouth once daily. 90 tablet 1     No current facility-administered medications for this visit.        Review of patient's allergies indicates:  No Known Allergies  Objective:    HPI     Hypertension      Additional comments: continuity of care              Follow-up      Additional comments: followup labs          Last edited by Jurgen Malhotra MA on 11/14/2018  8:47 AM. (History)      Blood pressure (!) 150/92, pulse 102, temperature 97.8 °F (36.6 °C), temperature source Temporal, height 5' 5" (1.651 m), weight 135.2 kg (298 lb), SpO2 97 %. Body mass index is 49.59 kg/m².   Physical Exam   Constitutional: He appears well-developed. No distress.   Obese     HENT:   Nose: Nose normal.   Mouth/Throat: Oropharynx is clear and moist.   Eyes: Conjunctivae are normal. Right eye exhibits no discharge. Left eye exhibits no discharge. No scleral icterus.   Neck: Carotid bruit is not present.   Cardiovascular: Normal rate, regular rhythm and normal heart sounds.   No murmur heard.  Pulmonary/Chest: Effort normal and breath sounds normal. No respiratory distress. He has no decreased breath sounds. He has no wheezes. He has no rhonchi. He has no rales.   Abdominal: Soft. He exhibits no distension. There is no tenderness. There is no rebound and no guarding. A hernia (umbilical) is present.   Musculoskeletal: He exhibits no edema.   Neurological: He is alert. He displays no tremor.   Skin: Skin is warm and dry.   Hyperpigmentation of posterior distal LE bilaterally     Psychiatric: He has a normal mood and affect. His speech is normal.   Nursing note and vitals reviewed.        Telephone on 11/02/2018 "   Component Date Value Ref Range Status    Cholesterol 11/12/2018 169  100 - 199 mg/dL Final    Triglycerides 11/12/2018 119  0 - 149 mg/dL Final    HDL 11/12/2018 42  >39 mg/dL Final    VLDL Cholesterol Phillip 11/12/2018 24  5 - 40 mg/dL Final    LDL Calculated 11/12/2018 103* 0 - 99 mg/dL Final    Glucose 11/12/2018 85  65 - 99 mg/dL Final    BUN, Bld 11/12/2018 11  6 - 24 mg/dL Final    Creatinine 11/12/2018 0.88  0.76 - 1.27 mg/dL Final    eGFR if non African American 11/12/2018 101  >59 mL/min/1.73 Final    eGFR if African American 11/12/2018 117  >59 mL/min/1.73 Final    BUN/Creatinine Ratio 11/12/2018 13  9 - 20 Final    Sodium 11/12/2018 142  134 - 144 mmol/L Final    Potassium 11/12/2018 4.6  3.5 - 5.2 mmol/L Final    Chloride 11/12/2018 103  96 - 106 mmol/L Final    CO2 11/12/2018 21  20 - 29 mmol/L Final    Calcium 11/12/2018 9.2  8.7 - 10.2 mg/dL Final    Total Protein 11/12/2018 6.9  6.0 - 8.5 g/dL Final    Albumin 11/12/2018 4.2  3.5 - 5.5 g/dL Final    Globulin, Total 11/12/2018 2.7  1.5 - 4.5 g/dL Final    Albumin/Globulin Ratio 11/12/2018 1.6  1.2 - 2.2 Final    Total Bilirubin 11/12/2018 0.7  0.0 - 1.2 mg/dL Final    Alkaline Phosphatase 11/12/2018 111  39 - 117 IU/L Final    AST 11/12/2018 24  0 - 40 IU/L Final    ALT 11/12/2018 22  0 - 44 IU/L Final   ]  Assessment:       1. Essential hypertension    2. Gout, unspecified cause, unspecified chronicity, unspecified site    3. Hyperlipidemia, unspecified hyperlipidemia type    4. Iron deficiency anemia, unspecified iron deficiency anemia type    5. Vitamin D deficiency    6. Essential hypertension    7. Familial hypercholesterolemia        Plan:       Hi was seen today for hypertension, hyperlipidemia and follow-up.    Diagnoses and all orders for this visit:    Essential hypertension  Comments:  Even though he hasn't had meds yet today, previous readings have been ULN; will add beta blocker.  Take amlodipine at  night.  Orders:  -     amLODIPine (NORVASC) 10 MG tablet; Take 1 tablet (10 mg total) by mouth every evening.  -     valsartan (DIOVAN) 320 MG tablet; Take 1 tablet (320 mg total) by mouth Daily. Take with food  -     metoprolol succinate (TOPROL-XL) 50 MG 24 hr tablet; Take 1 tablet (50 mg total) by mouth once daily.    Gout, unspecified cause, unspecified chronicity, unspecified site  -     allopurinol (ZYLOPRIM) 300 MG tablet; Take 1 tablet (300 mg total) by mouth once daily.  -     Uric acid; Future  -     Uric acid    Hyperlipidemia, unspecified hyperlipidemia type    Iron deficiency anemia, unspecified iron deficiency anemia type  -     CBC auto differential; Future  -     CBC auto differential    Vitamin D deficiency  -     Vitamin D; Future  -     Vitamin D    Essential hypertension  -     amLODIPine (NORVASC) 10 MG tablet; Take 1 tablet (10 mg total) by mouth every evening.  -     valsartan (DIOVAN) 320 MG tablet; Take 1 tablet (320 mg total) by mouth Daily. Take with food  -     metoprolol succinate (TOPROL-XL) 50 MG 24 hr tablet; Take 1 tablet (50 mg total) by mouth once daily.    Familial hypercholesterolemia  -     atorvastatin (LIPITOR) 20 MG tablet; Take 1 tablet (20 mg total) by mouth nightly.

## 2018-11-14 NOTE — PATIENT INSTRUCTIONS
Your overall health would benefit from weight loss.  There are many different systems out there to achieve weight loss but they are often hard to stick with over long periods of time.  I try to keep it simple.  Try and do some form of exercise, even if it is just walking, 30 minutes per day.  Try to limit your sugar intake by cutting back on soft drinks, sweet tea, desserts, snacks, etc.  Drink more water, especially while preparing a meal and with a meal.  This occupies space in your stomach and makes you feel full faster so you eat less.  Put less food on the plate; most of us eat double or triple the recommended serving sizes.  Even if you are eating the right things, you could still be eating too much.  Don't go back for seconds immediately.  There is a delay between your stomach and brain of about 20-30 minutes.  You may still feel hungry even though you really have had enough to eat.  Let your brain catch up to your stomach.  At the end of the day, it doesn't matter which system you use, it's about calories in and calories out.

## 2018-12-12 LAB
25(OH)D3+25(OH)D2 SERPL-MCNC: 27.7 NG/ML (ref 30–100)
BASOPHILS # BLD AUTO: 0 X10E3/UL (ref 0–0.2)
BASOPHILS NFR BLD AUTO: 0 %
EOSINOPHIL # BLD AUTO: 0.2 X10E3/UL (ref 0–0.4)
EOSINOPHIL NFR BLD AUTO: 2 %
ERYTHROCYTE [DISTWIDTH] IN BLOOD BY AUTOMATED COUNT: 13.9 % (ref 12.3–15.4)
HCT VFR BLD AUTO: 37.7 % (ref 37.5–51)
HGB BLD-MCNC: 12 G/DL (ref 13–17.7)
IMM GRANULOCYTES # BLD: 0 X10E3/UL (ref 0–0.1)
IMM GRANULOCYTES NFR BLD: 0 %
LYMPHOCYTES # BLD AUTO: 1.6 X10E3/UL (ref 0.7–3.1)
LYMPHOCYTES NFR BLD AUTO: 20 %
MCH RBC QN AUTO: 28.7 PG (ref 26.6–33)
MCHC RBC AUTO-ENTMCNC: 31.8 G/DL (ref 31.5–35.7)
MCV RBC AUTO: 90 FL (ref 79–97)
MONOCYTES # BLD AUTO: 0.8 X10E3/UL (ref 0.1–0.9)
MONOCYTES NFR BLD AUTO: 9 %
NEUTROPHILS # BLD AUTO: 5.6 X10E3/UL (ref 1.4–7)
NEUTROPHILS NFR BLD AUTO: 69 %
PLATELET # BLD AUTO: 380 X10E3/UL (ref 150–379)
RBC # BLD AUTO: 4.18 X10E6/UL (ref 4.14–5.8)
URATE SERPL-MCNC: 4.3 MG/DL (ref 3.7–8.6)
WBC # BLD AUTO: 8.2 X10E3/UL (ref 3.4–10.8)

## 2018-12-13 ENCOUNTER — OFFICE VISIT (OUTPATIENT)
Dept: FAMILY MEDICINE | Facility: CLINIC | Age: 48
End: 2018-12-13
Payer: COMMERCIAL

## 2018-12-13 VITALS
SYSTOLIC BLOOD PRESSURE: 112 MMHG | HEIGHT: 65 IN | BODY MASS INDEX: 49.65 KG/M2 | WEIGHT: 298 LBS | TEMPERATURE: 98 F | HEART RATE: 81 BPM | DIASTOLIC BLOOD PRESSURE: 76 MMHG | OXYGEN SATURATION: 95 %

## 2018-12-13 DIAGNOSIS — E55.9 VITAMIN D DEFICIENCY: Primary | ICD-10-CM

## 2018-12-13 DIAGNOSIS — Z23 NEED FOR PROPHYLACTIC VACCINATION AND INOCULATION AGAINST INFLUENZA: ICD-10-CM

## 2018-12-13 DIAGNOSIS — I10 ESSENTIAL HYPERTENSION: ICD-10-CM

## 2018-12-13 PROCEDURE — 99213 OFFICE O/P EST LOW 20 MIN: CPT | Mod: 25,,, | Performed by: INTERNAL MEDICINE

## 2018-12-13 PROCEDURE — 90686 IIV4 VACC NO PRSV 0.5 ML IM: CPT | Mod: ,,, | Performed by: INTERNAL MEDICINE

## 2018-12-13 PROCEDURE — 3008F BODY MASS INDEX DOCD: CPT | Mod: ,,, | Performed by: INTERNAL MEDICINE

## 2018-12-13 PROCEDURE — 90471 IMMUNIZATION ADMIN: CPT | Mod: ,,, | Performed by: INTERNAL MEDICINE

## 2018-12-13 PROCEDURE — 3078F DIAST BP <80 MM HG: CPT | Mod: ,,, | Performed by: INTERNAL MEDICINE

## 2018-12-13 PROCEDURE — 3074F SYST BP LT 130 MM HG: CPT | Mod: ,,, | Performed by: INTERNAL MEDICINE

## 2018-12-13 RX ORDER — ERGOCALCIFEROL 1.25 MG/1
50000 CAPSULE ORAL
Qty: 12 CAPSULE | Refills: 1 | Status: SHIPPED | OUTPATIENT
Start: 2018-12-13 | End: 2019-03-13 | Stop reason: SDUPTHER

## 2018-12-13 RX ORDER — AMLODIPINE BESYLATE 10 MG/1
10 TABLET ORAL NIGHTLY
Qty: 90 TABLET | Refills: 1 | Status: SHIPPED | OUTPATIENT
Start: 2018-12-13 | End: 2019-08-28 | Stop reason: SDUPTHER

## 2018-12-13 NOTE — PROGRESS NOTES
Subjective:       Patient ID: Hi Andres is a 48 y.o. male.    Chief Complaint: Hypertension (continuity of care and med refillls)    Here for follow up to recheck blood pressure and follow up on labs.  Tolerating meds fine.        Review of Systems   Constitutional: Negative for chills, fatigue, fever and unexpected weight change.   HENT: Negative for congestion, hearing loss, postnasal drip, rhinorrhea, trouble swallowing and voice change.    Eyes: Negative for photophobia and visual disturbance.   Respiratory: Positive for apnea. Negative for cough, choking, chest tightness, shortness of breath and wheezing.    Cardiovascular: Negative for chest pain, palpitations and leg swelling.   Gastrointestinal: Negative for abdominal pain, blood in stool, constipation, diarrhea, nausea, rectal pain and vomiting.   Endocrine: Negative for cold intolerance, heat intolerance, polydipsia and polyuria.   Genitourinary: Negative for decreased urine volume, difficulty urinating, discharge, dysuria, flank pain, frequency, genital sores, hematuria, testicular pain and urgency.   Musculoskeletal: Positive for arthralgias (right knee pain and left ankle). Negative for back pain, gait problem, joint swelling, myalgias and neck pain.   Skin: Negative for color change, rash and wound.   Allergic/Immunologic: Negative for environmental allergies and food allergies.   Neurological: Negative for dizziness, tremors, seizures, syncope, facial asymmetry, speech difficulty, weakness, light-headedness, numbness and headaches.   Hematological: Negative for adenopathy. Does not bruise/bleed easily.   Psychiatric/Behavioral: Negative for confusion, hallucinations, sleep disturbance and suicidal ideas. The patient is not nervous/anxious.        Past Medical History:   Diagnosis Date    Anemia     Arthritis     Gout     Hyperlipidemia     Hypertension     EVGENY on CPAP     Vitamin D deficiency       Past Surgical History:   Procedure  Laterality Date    HERNIA REPAIR         Family History   Problem Relation Age of Onset    Arthritis Father     Hypertension Father     No Known Problems Mother     Prostate cancer Neg Hx     Colon cancer Neg Hx        Social History     Socioeconomic History    Marital status:      Spouse name: None    Number of children: None    Years of education: None    Highest education level: None   Social Needs    Financial resource strain: None    Food insecurity - worry: None    Food insecurity - inability: None    Transportation needs - medical: None    Transportation needs - non-medical: None   Occupational History    None   Tobacco Use    Smoking status: Never Smoker    Smokeless tobacco: Never Used   Substance and Sexual Activity    Alcohol use: Yes     Frequency: Monthly or less     Drinks per session: 1 or 2     Comment: monthly    Drug use: No    Sexual activity: Yes     Partners: Female   Other Topics Concern    None   Social History Narrative    Live with wife       Current Outpatient Medications   Medication Sig Dispense Refill    allopurinol (ZYLOPRIM) 300 MG tablet Take 1 tablet (300 mg total) by mouth once daily. 90 tablet 1    amLODIPine (NORVASC) 10 MG tablet Take 1 tablet (10 mg total) by mouth every evening. 90 tablet 1    atorvastatin (LIPITOR) 20 MG tablet Take 1 tablet (20 mg total) by mouth nightly. 90 tablet 1    CIALIS 5 mg tablet TAKE ONE TABLET BY MOUTH ONCE DAILY 15 tablet 13    metoprolol succinate (TOPROL-XL) 50 MG 24 hr tablet Take 1 tablet (50 mg total) by mouth once daily. 90 tablet 1    multivitamin (MULTIPLE VITAMINS ORAL) Take 1 tablet by mouth once daily.      omega-3 fatty acids-fish oil (FISH OIL EXTRA STRENGTH) 435-880 mg Cap Take 1 capsule by mouth once daily.      valsartan (DIOVAN) 320 MG tablet Take 1 tablet (320 mg total) by mouth Daily. Take with food 90 tablet 1    ergocalciferol (ERGOCALCIFEROL) 50,000 unit Cap Take 1 capsule (50,000 Units  "total) by mouth every 7 days. 12 capsule 1     No current facility-administered medications for this visit.        Review of patient's allergies indicates:  No Known Allergies  Objective:    HPI     Hypertension      Additional comments: continuity of care and med refillls          Last edited by Jurgen Malhotra MA on 12/13/2018  9:04 AM. (History)      Blood pressure 112/76, pulse 81, temperature 97.9 °F (36.6 °C), temperature source Temporal, height 5' 5" (1.651 m), weight 135.2 kg (298 lb), SpO2 95 %. Body mass index is 49.59 kg/m².   Physical Exam   Constitutional: He appears well-developed. No distress.   Obese     HENT:   Nose: Nose normal.   Mouth/Throat: Oropharynx is clear and moist.   Eyes: Conjunctivae are normal. Right eye exhibits no discharge. Left eye exhibits no discharge. No scleral icterus.   Neck: Carotid bruit is not present.   Cardiovascular: Normal rate, regular rhythm and normal heart sounds.   No murmur heard.  Pulmonary/Chest: Effort normal and breath sounds normal. No respiratory distress. He has no decreased breath sounds. He has no wheezes. He has no rhonchi. He has no rales.   Abdominal: Soft. He exhibits no distension. There is no tenderness. There is no rebound and no guarding. A hernia (umbilical) is present.   Musculoskeletal: He exhibits no edema.   Neurological: He is alert. He displays no tremor.   Skin: Skin is warm and dry.   Hyperpigmentation of posterior distal LE bilaterally     Psychiatric: He has a normal mood and affect. His speech is normal.   Nursing note and vitals reviewed.        Office Visit on 11/14/2018   Component Date Value Ref Range Status    WBC 12/11/2018 8.2  3.4 - 10.8 x10E3/uL Final    RBC 12/11/2018 4.18  4.14 - 5.80 x10E6/uL Final    Hemoglobin 12/11/2018 12.0* 13.0 - 17.7 g/dL Final    Hematocrit 12/11/2018 37.7  37.5 - 51.0 % Final    MCV 12/11/2018 90  79 - 97 fL Final    MCH 12/11/2018 28.7  26.6 - 33.0 pg Final    MCHC 12/11/2018 31.8  31.5 - " 35.7 g/dL Final    RDW 12/11/2018 13.9  12.3 - 15.4 % Final    Platelets 12/11/2018 380* 150 - 379 x10E3/uL Final    Neutrophils 12/11/2018 69  Not Estab. % Final    Lymph% 12/11/2018 20  Not Estab. % Final    Mono% 12/11/2018 9  Not Estab. % Final    Eosinophil% 12/11/2018 2  Not Estab. % Final    Basophil% 12/11/2018 0  Not Estab. % Final    Neutrophils Absolute 12/11/2018 5.6  1.4 - 7.0 x10E3/uL Final    Lymph # 12/11/2018 1.6  0.7 - 3.1 x10E3/uL Final    Mono # 12/11/2018 0.8  0.1 - 0.9 x10E3/uL Final    Eos # 12/11/2018 0.2  0.0 - 0.4 x10E3/uL Final    Baso # 12/11/2018 0.0  0.0 - 0.2 x10E3/uL Final    Immature Granulocytes 12/11/2018 0  Not Estab. % Final    Immature Grans (Abs) 12/11/2018 0.0  0.0 - 0.1 x10E3/uL Final    Vit D, 25-Hydroxy 12/11/2018 27.7* 30.0 - 100.0 ng/mL Final    Comment: Vitamin D deficiency has been defined by the [place] [employer] and an Endocrine Society practice guideline as a  level of serum 25-OH vitamin D less than 20 ng/mL (1,2).  The Endocrine Society went on to further define vitamin D  insufficiency as a level between 21 and 29 ng/mL (2).  1. IOM ([place] [employer]). 2010. Dietary reference     intakes for calcium and D. [place]: The     National Academies Press.  2. Chelsea MF, Kory NC, Edmundo JIMENEZ, et al.     Evaluation, treatment, and prevention of vitamin D     deficiency: an Endocrine Society clinical practice     guideline. JCEM. 2011 Jul; 96(7):1911-30.      Uric Acid 12/11/2018 4.3  3.7 - 8.6 mg/dL Final               Therapeutic target for gout patients: <6.0   ]  Assessment:       1. Vitamin D deficiency    2. Essential hypertension    3. Need for prophylactic vaccination and inoculation against influenza        Plan:       Hi was seen today for hypertension.    Diagnoses and all orders for this visit:    Vitamin D deficiency  -     ergocalciferol (ERGOCALCIFEROL) 50,000 unit Cap; Take 1 capsule (50,000 Units total) by  mouth every 7 days.    Essential hypertension  Comments:  Much better today  Orders:  -     amLODIPine (NORVASC) 10 MG tablet; Take 1 tablet (10 mg total) by mouth every evening.    Need for prophylactic vaccination and inoculation against influenza  -     Influenza - Quadrivalent (3 years & older) (PF)

## 2019-03-13 ENCOUNTER — OFFICE VISIT (OUTPATIENT)
Dept: FAMILY MEDICINE | Facility: CLINIC | Age: 49
End: 2019-03-13
Payer: COMMERCIAL

## 2019-03-13 VITALS
SYSTOLIC BLOOD PRESSURE: 126 MMHG | HEART RATE: 76 BPM | DIASTOLIC BLOOD PRESSURE: 82 MMHG | WEIGHT: 306 LBS | OXYGEN SATURATION: 76 % | HEIGHT: 65 IN | TEMPERATURE: 98 F | BODY MASS INDEX: 50.98 KG/M2

## 2019-03-13 DIAGNOSIS — I10 ESSENTIAL HYPERTENSION: Primary | ICD-10-CM

## 2019-03-13 DIAGNOSIS — E55.9 VITAMIN D DEFICIENCY: ICD-10-CM

## 2019-03-13 PROCEDURE — 3074F PR MOST RECENT SYSTOLIC BLOOD PRESSURE < 130 MM HG: ICD-10-PCS | Mod: ,,, | Performed by: INTERNAL MEDICINE

## 2019-03-13 PROCEDURE — 99213 PR OFFICE/OUTPT VISIT, EST, LEVL III, 20-29 MIN: ICD-10-PCS | Mod: ,,, | Performed by: INTERNAL MEDICINE

## 2019-03-13 PROCEDURE — 3008F PR BODY MASS INDEX (BMI) DOCUMENTED: ICD-10-PCS | Mod: ,,, | Performed by: INTERNAL MEDICINE

## 2019-03-13 PROCEDURE — 99213 OFFICE O/P EST LOW 20 MIN: CPT | Mod: ,,, | Performed by: INTERNAL MEDICINE

## 2019-03-13 PROCEDURE — 3079F PR MOST RECENT DIASTOLIC BLOOD PRESSURE 80-89 MM HG: ICD-10-PCS | Mod: ,,, | Performed by: INTERNAL MEDICINE

## 2019-03-13 PROCEDURE — 3008F BODY MASS INDEX DOCD: CPT | Mod: ,,, | Performed by: INTERNAL MEDICINE

## 2019-03-13 PROCEDURE — 3079F DIAST BP 80-89 MM HG: CPT | Mod: ,,, | Performed by: INTERNAL MEDICINE

## 2019-03-13 PROCEDURE — 3074F SYST BP LT 130 MM HG: CPT | Mod: ,,, | Performed by: INTERNAL MEDICINE

## 2019-03-13 RX ORDER — ERGOCALCIFEROL 1.25 MG/1
50000 CAPSULE ORAL
Qty: 12 CAPSULE | Refills: 1 | Status: SHIPPED | OUTPATIENT
Start: 2019-03-13 | End: 2019-08-28 | Stop reason: SDUPTHER

## 2019-03-13 NOTE — PROGRESS NOTES
Subjective:       Patient ID: Hi Andres is a 48 y.o. male.    Chief Complaint: Hypertension (continuity of care and med refill)    Hypertension   This is a chronic problem. The problem is controlled. Pertinent negatives include no chest pain, headaches, neck pain, palpitations, peripheral edema or shortness of breath. Risk factors for coronary artery disease include dyslipidemia, obesity and male gender. Past treatments include angiotensin blockers and calcium channel blockers. There are no compliance problems.  There is no history of angina, kidney disease, CAD/MI, CVA or heart failure.     Review of Systems   Constitutional: Negative for chills, fatigue, fever and unexpected weight change.   HENT: Negative for congestion, hearing loss, postnasal drip, rhinorrhea, trouble swallowing and voice change.    Eyes: Negative for photophobia and visual disturbance.   Respiratory: Positive for apnea. Negative for cough, choking, chest tightness, shortness of breath and wheezing.    Cardiovascular: Negative for chest pain, palpitations and leg swelling.   Gastrointestinal: Negative for abdominal pain, blood in stool, constipation, diarrhea, nausea, rectal pain and vomiting.   Endocrine: Negative for cold intolerance, heat intolerance, polydipsia and polyuria.   Genitourinary: Negative for decreased urine volume, difficulty urinating, discharge, dysuria, flank pain, frequency, genital sores, hematuria, testicular pain and urgency.   Musculoskeletal: Positive for arthralgias and joint swelling (left ankle). Negative for back pain, gait problem, myalgias and neck pain.   Skin: Negative for color change, rash and wound.   Allergic/Immunologic: Negative for environmental allergies and food allergies.   Neurological: Negative for dizziness, tremors, seizures, syncope, facial asymmetry, speech difficulty, weakness, light-headedness, numbness and headaches.   Hematological: Negative for adenopathy. Does not bruise/bleed  easily.   Psychiatric/Behavioral: Negative for confusion, hallucinations, sleep disturbance and suicidal ideas. The patient is not nervous/anxious.        Past Medical History:   Diagnosis Date    Anemia     Arthritis     Gout     Hyperlipidemia     Hypertension     EVGENY on CPAP     Vitamin D deficiency       Past Surgical History:   Procedure Laterality Date    HERNIA REPAIR         Family History   Problem Relation Age of Onset    Arthritis Father     Hypertension Father     No Known Problems Mother     Prostate cancer Neg Hx     Colon cancer Neg Hx        Social History     Socioeconomic History    Marital status:      Spouse name: None    Number of children: None    Years of education: None    Highest education level: None   Social Needs    Financial resource strain: None    Food insecurity - worry: None    Food insecurity - inability: None    Transportation needs - medical: None    Transportation needs - non-medical: None   Occupational History    None   Tobacco Use    Smoking status: Never Smoker    Smokeless tobacco: Never Used   Substance and Sexual Activity    Alcohol use: Yes     Frequency: Monthly or less     Drinks per session: 1 or 2     Comment: monthly    Drug use: No    Sexual activity: Yes     Partners: Female   Other Topics Concern    None   Social History Narrative    Live with wife       Current Outpatient Medications   Medication Sig Dispense Refill    allopurinol (ZYLOPRIM) 300 MG tablet Take 1 tablet (300 mg total) by mouth once daily. 90 tablet 1    amLODIPine (NORVASC) 10 MG tablet Take 1 tablet (10 mg total) by mouth every evening. 90 tablet 1    atorvastatin (LIPITOR) 20 MG tablet Take 1 tablet (20 mg total) by mouth nightly. 90 tablet 1    CIALIS 5 mg tablet TAKE ONE TABLET BY MOUTH ONCE DAILY 15 tablet 13    ergocalciferol (ERGOCALCIFEROL) 50,000 unit Cap Take 1 capsule (50,000 Units total) by mouth every 7 days. 12 capsule 1    metoprolol  "succinate (TOPROL-XL) 50 MG 24 hr tablet Take 1 tablet (50 mg total) by mouth once daily. 90 tablet 1    multivitamin (MULTIPLE VITAMINS ORAL) Take 1 tablet by mouth once daily.      omega-3 fatty acids-fish oil (FISH OIL EXTRA STRENGTH) 435-880 mg Cap Take 1 capsule by mouth once daily.      valsartan (DIOVAN) 320 MG tablet Take 1 tablet (320 mg total) by mouth Daily. Take with food 90 tablet 1     No current facility-administered medications for this visit.        Review of patient's allergies indicates:  No Known Allergies  Objective:    HPI     Hypertension      Additional comments: continuity of care and med refill          Last edited by Jurgen Malhotra MA on 3/13/2019  9:06 AM. (History)      Blood pressure 126/82, pulse 76, temperature 98.2 °F (36.8 °C), temperature source Temporal, height 5' 5" (1.651 m), weight (!) 138.8 kg (306 lb), SpO2 (!) 76 %. Body mass index is 50.92 kg/m².   Physical Exam   Constitutional: He appears well-developed. No distress.   Obese     HENT:   Nose: Nose normal.   Mouth/Throat: Oropharynx is clear and moist.   Eyes: Conjunctivae are normal. Right eye exhibits no discharge. Left eye exhibits no discharge. No scleral icterus.   Neck: Carotid bruit is not present.   Cardiovascular: Normal rate, regular rhythm and normal heart sounds.   No murmur heard.  Pulmonary/Chest: Effort normal and breath sounds normal. No respiratory distress. He has no decreased breath sounds. He has no wheezes. He has no rhonchi. He has no rales.   Abdominal: Soft. He exhibits no distension. There is no tenderness. There is no rebound and no guarding. A hernia (umbilical) is present.   Musculoskeletal: He exhibits no edema.   Neurological: He is alert. He displays no tremor.   Skin: Skin is warm and dry.        Psychiatric: He has a normal mood and affect. His speech is normal.   Nursing note and vitals reviewed.          Assessment:       1. Essential hypertension        Plan:       Hi was seen " today for hypertension.    Diagnoses and all orders for this visit:    Essential hypertension  Comments:  Continues to be well controlled.

## 2019-06-04 DIAGNOSIS — E78.01 FAMILIAL HYPERCHOLESTEROLEMIA: ICD-10-CM

## 2019-06-04 RX ORDER — ATORVASTATIN CALCIUM 20 MG/1
TABLET, FILM COATED ORAL
Qty: 90 TABLET | Refills: 1 | Status: SHIPPED | OUTPATIENT
Start: 2019-06-04 | End: 2019-08-28 | Stop reason: SDUPTHER

## 2019-06-17 DIAGNOSIS — I10 ESSENTIAL HYPERTENSION: ICD-10-CM

## 2019-06-17 RX ORDER — METOPROLOL SUCCINATE 50 MG/1
TABLET, EXTENDED RELEASE ORAL
Qty: 90 TABLET | Refills: 1 | Status: SHIPPED | OUTPATIENT
Start: 2019-06-17 | End: 2019-08-28 | Stop reason: SDUPTHER

## 2019-08-13 ENCOUNTER — TELEPHONE (OUTPATIENT)
Dept: FAMILY MEDICINE | Facility: CLINIC | Age: 49
End: 2019-08-13

## 2019-08-13 DIAGNOSIS — D50.9 IRON DEFICIENCY ANEMIA, UNSPECIFIED IRON DEFICIENCY ANEMIA TYPE: ICD-10-CM

## 2019-08-13 DIAGNOSIS — G47.33 OSA ON CPAP: ICD-10-CM

## 2019-08-13 DIAGNOSIS — I10 ESSENTIAL HYPERTENSION: Primary | ICD-10-CM

## 2019-08-13 DIAGNOSIS — M10.9 GOUT, UNSPECIFIED CAUSE, UNSPECIFIED CHRONICITY, UNSPECIFIED SITE: ICD-10-CM

## 2019-08-13 DIAGNOSIS — E55.9 VITAMIN D DEFICIENCY: ICD-10-CM

## 2019-08-13 NOTE — TELEPHONE ENCOUNTER
----- Message from Jurgen Malhotra MA sent at 8/13/2019 10:32 AM CDT -----  Contact: Patient      ----- Message -----  From: Morelia Umanzor  Sent: 8/13/2019   9:51 AM  To: [facility] Staff    Patient:  Hi Andres  Appointment Date:  8/28/19  Lab:  [facility]

## 2019-08-27 LAB
25(OH)D3+25(OH)D2 SERPL-MCNC: 26.3 NG/ML (ref 30–100)
ALBUMIN SERPL-MCNC: 4.5 G/DL (ref 3.5–5.5)
ALBUMIN/GLOB SERPL: 1.7 {RATIO} (ref 1.2–2.2)
ALP SERPL-CCNC: 133 IU/L (ref 39–117)
ALT SERPL-CCNC: 24 IU/L (ref 0–44)
APPEARANCE UR: CLEAR
AST SERPL-CCNC: 23 IU/L (ref 0–40)
BASOPHILS # BLD AUTO: 0 X10E3/UL (ref 0–0.2)
BASOPHILS NFR BLD AUTO: 0 %
BILIRUB SERPL-MCNC: 0.9 MG/DL (ref 0–1.2)
BILIRUB UR QL STRIP: NEGATIVE
BUN SERPL-MCNC: 12 MG/DL (ref 6–24)
BUN/CREAT SERPL: 14 (ref 9–20)
CALCIUM SERPL-MCNC: 9.4 MG/DL (ref 8.7–10.2)
CHLORIDE SERPL-SCNC: 102 MMOL/L (ref 96–106)
CHOLEST SERPL-MCNC: 135 MG/DL (ref 100–199)
CO2 SERPL-SCNC: 22 MMOL/L (ref 20–29)
COLOR UR: YELLOW
CREAT SERPL-MCNC: 0.88 MG/DL (ref 0.76–1.27)
EOSINOPHIL # BLD AUTO: 0.1 X10E3/UL (ref 0–0.4)
EOSINOPHIL NFR BLD AUTO: 2 %
ERYTHROCYTE [DISTWIDTH] IN BLOOD BY AUTOMATED COUNT: 14 % (ref 12.3–15.4)
GLOBULIN SER CALC-MCNC: 2.6 G/DL (ref 1.5–4.5)
GLUCOSE SERPL-MCNC: 86 MG/DL (ref 65–99)
GLUCOSE UR QL: NEGATIVE
HCT VFR BLD AUTO: 37.5 % (ref 37.5–51)
HDLC SERPL-MCNC: 40 MG/DL
HGB BLD-MCNC: 11.9 G/DL (ref 13–17.7)
HGB UR QL STRIP: NEGATIVE
IMM GRANULOCYTES # BLD AUTO: 0 X10E3/UL (ref 0–0.1)
IMM GRANULOCYTES NFR BLD AUTO: 0 %
KETONES UR QL STRIP: NEGATIVE
LDLC SERPL CALC-MCNC: 76 MG/DL (ref 0–99)
LEUKOCYTE ESTERASE UR QL STRIP: NEGATIVE
LYMPHOCYTES # BLD AUTO: 1.7 X10E3/UL (ref 0.7–3.1)
LYMPHOCYTES NFR BLD AUTO: 22 %
MCH RBC QN AUTO: 28.5 PG (ref 26.6–33)
MCHC RBC AUTO-ENTMCNC: 31.7 G/DL (ref 31.5–35.7)
MCV RBC AUTO: 90 FL (ref 79–97)
MICRO URNS: NORMAL
MONOCYTES # BLD AUTO: 0.6 X10E3/UL (ref 0.1–0.9)
MONOCYTES NFR BLD AUTO: 8 %
NEUTROPHILS # BLD AUTO: 5.4 X10E3/UL (ref 1.4–7)
NEUTROPHILS NFR BLD AUTO: 68 %
NITRITE UR QL STRIP: NEGATIVE
PH UR STRIP: 6.5 [PH] (ref 5–7.5)
PLATELET # BLD AUTO: 404 X10E3/UL (ref 150–450)
POTASSIUM SERPL-SCNC: 3.9 MMOL/L (ref 3.5–5.2)
PROT SERPL-MCNC: 7.1 G/DL (ref 6–8.5)
PROT UR QL STRIP: NEGATIVE
RBC # BLD AUTO: 4.18 X10E6/UL (ref 4.14–5.8)
SODIUM SERPL-SCNC: 139 MMOL/L (ref 134–144)
SP GR UR: 1.02 (ref 1–1.03)
TRIGL SERPL-MCNC: 94 MG/DL (ref 0–149)
URATE SERPL-MCNC: 4.2 MG/DL (ref 3.7–8.6)
UROBILINOGEN UR STRIP-MCNC: 1 MG/DL (ref 0.2–1)
VLDLC SERPL CALC-MCNC: 19 MG/DL (ref 5–40)
WBC # BLD AUTO: 7.9 X10E3/UL (ref 3.4–10.8)

## 2019-08-28 ENCOUNTER — OFFICE VISIT (OUTPATIENT)
Dept: FAMILY MEDICINE | Facility: CLINIC | Age: 49
End: 2019-08-28
Payer: COMMERCIAL

## 2019-08-28 VITALS
HEIGHT: 65 IN | OXYGEN SATURATION: 97 % | TEMPERATURE: 99 F | BODY MASS INDEX: 51.82 KG/M2 | WEIGHT: 311 LBS | HEART RATE: 80 BPM | DIASTOLIC BLOOD PRESSURE: 86 MMHG | SYSTOLIC BLOOD PRESSURE: 150 MMHG

## 2019-08-28 DIAGNOSIS — M10.9 GOUT, UNSPECIFIED CAUSE, UNSPECIFIED CHRONICITY, UNSPECIFIED SITE: ICD-10-CM

## 2019-08-28 DIAGNOSIS — E78.01 FAMILIAL HYPERCHOLESTEROLEMIA: ICD-10-CM

## 2019-08-28 DIAGNOSIS — I10 ESSENTIAL HYPERTENSION: Primary | ICD-10-CM

## 2019-08-28 DIAGNOSIS — E55.9 VITAMIN D DEFICIENCY: ICD-10-CM

## 2019-08-28 PROCEDURE — 3077F PR MOST RECENT SYSTOLIC BLOOD PRESSURE >= 140 MM HG: ICD-10-PCS | Mod: S$GLB,,, | Performed by: INTERNAL MEDICINE

## 2019-08-28 PROCEDURE — 3008F BODY MASS INDEX DOCD: CPT | Mod: S$GLB,,, | Performed by: INTERNAL MEDICINE

## 2019-08-28 PROCEDURE — 3079F PR MOST RECENT DIASTOLIC BLOOD PRESSURE 80-89 MM HG: ICD-10-PCS | Mod: S$GLB,,, | Performed by: INTERNAL MEDICINE

## 2019-08-28 PROCEDURE — 3077F SYST BP >= 140 MM HG: CPT | Mod: S$GLB,,, | Performed by: INTERNAL MEDICINE

## 2019-08-28 PROCEDURE — 3079F DIAST BP 80-89 MM HG: CPT | Mod: S$GLB,,, | Performed by: INTERNAL MEDICINE

## 2019-08-28 PROCEDURE — 99213 PR OFFICE/OUTPT VISIT, EST, LEVL III, 20-29 MIN: ICD-10-PCS | Mod: S$GLB,,, | Performed by: INTERNAL MEDICINE

## 2019-08-28 PROCEDURE — 3008F PR BODY MASS INDEX (BMI) DOCUMENTED: ICD-10-PCS | Mod: S$GLB,,, | Performed by: INTERNAL MEDICINE

## 2019-08-28 PROCEDURE — 99213 OFFICE O/P EST LOW 20 MIN: CPT | Mod: S$GLB,,, | Performed by: INTERNAL MEDICINE

## 2019-08-28 RX ORDER — AMLODIPINE BESYLATE 10 MG/1
10 TABLET ORAL NIGHTLY
Qty: 90 TABLET | Refills: 1 | Status: SHIPPED | OUTPATIENT
Start: 2019-08-28 | End: 2020-03-13 | Stop reason: SDUPTHER

## 2019-08-28 RX ORDER — VALSARTAN 320 MG/1
320 TABLET ORAL DAILY
Qty: 90 TABLET | Refills: 1 | Status: SHIPPED | OUTPATIENT
Start: 2019-08-28 | End: 2020-03-13 | Stop reason: SDUPTHER

## 2019-08-28 RX ORDER — METOPROLOL SUCCINATE 50 MG/1
50 TABLET, EXTENDED RELEASE ORAL DAILY
Qty: 90 TABLET | Refills: 1 | Status: SHIPPED | OUTPATIENT
Start: 2019-08-28 | End: 2020-03-13 | Stop reason: SDUPTHER

## 2019-08-28 RX ORDER — ERGOCALCIFEROL 1.25 MG/1
50000 CAPSULE ORAL
Qty: 12 CAPSULE | Refills: 1 | Status: SHIPPED | OUTPATIENT
Start: 2019-08-28 | End: 2020-03-13 | Stop reason: SDUPTHER

## 2019-08-28 RX ORDER — ALLOPURINOL 300 MG/1
300 TABLET ORAL DAILY
Qty: 90 TABLET | Refills: 1 | Status: SHIPPED | OUTPATIENT
Start: 2019-08-28 | End: 2020-03-13 | Stop reason: SDUPTHER

## 2019-08-28 RX ORDER — ATORVASTATIN CALCIUM 20 MG/1
20 TABLET, FILM COATED ORAL NIGHTLY
Qty: 90 TABLET | Refills: 1 | Status: SHIPPED | OUTPATIENT
Start: 2019-08-28 | End: 2020-03-13 | Stop reason: SDUPTHER

## 2019-08-28 NOTE — PROGRESS NOTES
Subjective:       Patient ID: Hi Andres is a 48 y.o. male.    Chief Complaint: Hypertension (continuity of care and followup on labs) and Hyperlipidemia    Hypertension   This is a chronic problem. The problem is controlled. Associated symptoms include peripheral edema. Pertinent negatives include no chest pain, headaches, neck pain, palpitations or shortness of breath. Risk factors for coronary artery disease include dyslipidemia, obesity and male gender. Past treatments include angiotensin blockers and calcium channel blockers. There are no compliance problems (hasn't taken meds yet this AM because he usually waits till he eats).  There is no history of angina, kidney disease, CAD/MI, CVA or heart failure.     Review of Systems   Constitutional: Negative for chills, fatigue, fever and unexpected weight change.   HENT: Negative for congestion, hearing loss, postnasal drip, rhinorrhea, trouble swallowing and voice change.    Eyes: Negative for photophobia and visual disturbance.   Respiratory: Positive for apnea. Negative for cough, choking, chest tightness, shortness of breath and wheezing.    Cardiovascular: Negative for chest pain, palpitations and leg swelling.   Gastrointestinal: Negative for abdominal pain, blood in stool, constipation, diarrhea, nausea, rectal pain and vomiting.   Endocrine: Negative for cold intolerance, heat intolerance, polydipsia and polyuria.   Genitourinary: Negative for decreased urine volume, difficulty urinating, discharge, dysuria, flank pain, frequency, genital sores, hematuria, testicular pain and urgency.   Musculoskeletal: Positive for arthralgias. Negative for back pain, gait problem, joint swelling, myalgias and neck pain.   Skin: Positive for color change (both lower legs). Negative for rash and wound.   Allergic/Immunologic: Negative for environmental allergies and food allergies.   Neurological: Negative for dizziness, tremors, seizures, syncope, facial asymmetry,  speech difficulty, weakness, light-headedness, numbness and headaches.   Hematological: Negative for adenopathy. Does not bruise/bleed easily.   Psychiatric/Behavioral: Negative for confusion, hallucinations, sleep disturbance and suicidal ideas. The patient is not nervous/anxious.        Past Medical History:   Diagnosis Date    Anemia     Arthritis     Gout     Hyperlipidemia     Hypertension     EVGENY on CPAP     Vitamin D deficiency       Past Surgical History:   Procedure Laterality Date    HERNIA REPAIR         Family History   Problem Relation Age of Onset    Arthritis Father     Hypertension Father     No Known Problems Mother     Prostate cancer Neg Hx     Colon cancer Neg Hx        Social History     Socioeconomic History    Marital status:      Spouse name: Not on file    Number of children: Not on file    Years of education: Not on file    Highest education level: Not on file   Occupational History    Not on file   Social Needs    Financial resource strain: Not on file    Food insecurity:     Worry: Not on file     Inability: Not on file    Transportation needs:     Medical: Not on file     Non-medical: Not on file   Tobacco Use    Smoking status: Never Smoker    Smokeless tobacco: Never Used   Substance and Sexual Activity    Alcohol use: Yes     Frequency: Monthly or less     Drinks per session: 1 or 2     Comment: monthly    Drug use: No    Sexual activity: Yes     Partners: Female   Lifestyle    Physical activity:     Days per week: Not on file     Minutes per session: Not on file    Stress: Not at all   Relationships    Social connections:     Talks on phone: Not on file     Gets together: Not on file     Attends [employer] service: Not on file     Active member of club or organization: Not on file     Attends meetings of clubs or organizations: Not on file     Relationship status: Not on file   Other Topics Concern    Not on file   Social History Narrative    Live  "with wife       Current Outpatient Medications   Medication Sig Dispense Refill    allopurinol (ZYLOPRIM) 300 MG tablet Take 1 tablet (300 mg total) by mouth once daily. 90 tablet 1    amLODIPine (NORVASC) 10 MG tablet Take 1 tablet (10 mg total) by mouth every evening. 90 tablet 1    atorvastatin (LIPITOR) 20 MG tablet TAKE ONE TABLET BY MOUTH NIGHTLY. 90 tablet 1    CIALIS 5 mg tablet TAKE ONE TABLET BY MOUTH ONCE DAILY 15 tablet 13    ergocalciferol (ERGOCALCIFEROL) 50,000 unit Cap Take 1 capsule (50,000 Units total) by mouth every 7 days. 12 capsule 1    metoprolol succinate (TOPROL-XL) 50 MG 24 hr tablet TAKE 1 TABLET BY MOUTH ONCE DAILY 90 tablet 1    multivitamin (MULTIPLE VITAMINS ORAL) Take 1 tablet by mouth once daily.      omega-3 fatty acids-fish oil (FISH OIL EXTRA STRENGTH) 435-880 mg Cap Take 1 capsule by mouth once daily.      valsartan (DIOVAN) 320 MG tablet Take 1 tablet (320 mg total) by mouth Daily. Take with food 90 tablet 1     No current facility-administered medications for this visit.        Review of patient's allergies indicates:  No Known Allergies  Objective:    HPI     Hypertension      Additional comments: continuity of care and followup on labs          Last edited by Jurgen Malhotra MA on 8/28/2019  9:01 AM. (History)      Blood pressure (!) 150/86, pulse 80, temperature 98.5 °F (36.9 °C), temperature source Temporal, height 5' 5" (1.651 m), weight (!) 141.1 kg (311 lb), SpO2 97 %. Body mass index is 51.75 kg/m².   Physical Exam   Constitutional: He appears well-developed. No distress.   Obese     HENT:   Nose: Nose normal.   Mouth/Throat: Oropharynx is clear and moist.   Eyes: Conjunctivae are normal. Right eye exhibits no discharge. Left eye exhibits no discharge. No scleral icterus.   Neck: Carotid bruit is not present.   Cardiovascular: Normal rate, regular rhythm and normal heart sounds.   No murmur heard.  Pulmonary/Chest: Effort normal and breath sounds normal. No " respiratory distress. He has no decreased breath sounds. He has no wheezes. He has no rhonchi. He has no rales.   Abdominal: Soft. He exhibits no distension. There is no tenderness. There is no rebound and no guarding. A hernia (umbilical) is present.   Musculoskeletal: He exhibits edema (1+).   Neurological: He is alert. He displays no tremor.   Skin: Skin is warm and dry.        Psychiatric: He has a normal mood and affect. His speech is normal.   Nursing note and vitals reviewed.        Telephone on 08/13/2019   Component Date Value Ref Range Status    Specific Gravity, UA 08/26/2019 1.019  1.005 - 1.030 Final    pH, UA 08/26/2019 6.5  5.0 - 7.5 Final    Color, UA 08/26/2019 Yellow  Yellow Final    Clarity, UA 08/26/2019 Clear  Clear Final    Leukocytes, UA 08/26/2019 Negative  Negative Final    Protein, UA 08/26/2019 Negative  Negative/Trace Final    Glucose, UA 08/26/2019 Negative  Negative Final    Ketones, UA 08/26/2019 Negative  Negative Final    Occult Blood UA 08/26/2019 Negative  Negative Final    Bilirubin, UA 08/26/2019 Negative  Negative Final    Urobilinogen, UA 08/26/2019 1.0  0.2 - 1.0 mg/dL Final    Nitrite, UA 08/26/2019 Negative  Negative Final    Microscopic Examination 08/26/2019 Comment   Final    Microscopic not indicated and not performed.    Cholesterol 08/26/2019 135  100 - 199 mg/dL Final    Triglycerides 08/26/2019 94  0 - 149 mg/dL Final    HDL 08/26/2019 40  >39 mg/dL Final    VLDL Cholesterol Phillip 08/26/2019 19  5 - 40 mg/dL Final    LDL Calculated 08/26/2019 76  0 - 99 mg/dL Final    Glucose 08/26/2019 86  65 - 99 mg/dL Final    BUN, Bld 08/26/2019 12  6 - 24 mg/dL Final    Creatinine 08/26/2019 0.88  0.76 - 1.27 mg/dL Final    eGFR if non African American 08/26/2019 101  >59 mL/min/1.73 Final    eGFR if  08/26/2019 117  >59 mL/min/1.73 Final    BUN/Creatinine Ratio 08/26/2019 14  9 - 20 Final    Sodium 08/26/2019 139  134 - 144 mmol/L Final     Potassium 08/26/2019 3.9  3.5 - 5.2 mmol/L Final    Chloride 08/26/2019 102  96 - 106 mmol/L Final    CO2 08/26/2019 22  20 - 29 mmol/L Final    Calcium 08/26/2019 9.4  8.7 - 10.2 mg/dL Final    Total Protein 08/26/2019 7.1  6.0 - 8.5 g/dL Final    Albumin 08/26/2019 4.5  3.5 - 5.5 g/dL Final    Globulin, Total 08/26/2019 2.6  1.5 - 4.5 g/dL Final    Albumin/Globulin Ratio 08/26/2019 1.7  1.2 - 2.2 Final    Total Bilirubin 08/26/2019 0.9  0.0 - 1.2 mg/dL Final    Alkaline Phosphatase 08/26/2019 133* 39 - 117 IU/L Final    AST 08/26/2019 23  0 - 40 IU/L Final    ALT 08/26/2019 24  0 - 44 IU/L Final    Vit D, 25-Hydroxy 08/26/2019 26.3* 30.0 - 100.0 ng/mL Final    Comment: Vitamin D deficiency has been defined by the [place] [employer] and an Endocrine Society practice guideline as a  level of serum 25-OH vitamin D less than 20 ng/mL (1,2).  The Endocrine Society went on to further define vitamin D  insufficiency as a level between 21 and 29 ng/mL (2).  1. IOM ([employer]). 2010. Dietary reference     intakes for calcium and D. [place]: The     National Academies Press.  2. Chelsea MF, Kory NC, Edmundo JIMENEZ, et al.     Evaluation, treatment, and prevention of vitamin D     deficiency: an Endocrine Society clinical practice     guideline. JCEM. 2011 Jul; 96(7):1911-30.      Uric Acid 08/26/2019 4.2  3.7 - 8.6 mg/dL Final               Therapeutic target for gout patients: <6.0    WBC 08/26/2019 7.9  3.4 - 10.8 x10E3/uL Final    RBC 08/26/2019 4.18  4.14 - 5.80 x10E6/uL Final    Hemoglobin 08/26/2019 11.9* 13.0 - 17.7 g/dL Final    Hematocrit 08/26/2019 37.5  37.5 - 51.0 % Final    Mean Corpuscular Volume 08/26/2019 90  79 - 97 fL Final    Mean Corpuscular Hemoglobin 08/26/2019 28.5  26.6 - 33.0 pg Final    Mean Corpuscular Hemoglobin Conc 08/26/2019 31.7  31.5 - 35.7 g/dL Final    RDW 08/26/2019 14.0  12.3 - 15.4 % Final    Platelets 08/26/2019 404  765 - 623  x10E3/uL Final    Neutrophils 08/26/2019 68  Not Estab. % Final    Lymph% 08/26/2019 22  Not Estab. % Final    Mono% 08/26/2019 8  Not Estab. % Final    Eosinophil% 08/26/2019 2  Not Estab. % Final    Basophil% 08/26/2019 0  Not Estab. % Final    Neutrophils Absolute 08/26/2019 5.4  1.4 - 7.0 x10E3/uL Final    Lymph # 08/26/2019 1.7  0.7 - 3.1 x10E3/uL Final    Mono # 08/26/2019 0.6  0.1 - 0.9 x10E3/uL Final    Eos # 08/26/2019 0.1  0.0 - 0.4 x10E3/uL Final    Baso # 08/26/2019 0.0  0.0 - 0.2 x10E3/uL Final    Immature Granulocytes 08/26/2019 0  Not Estab. % Final    Immature Grans (Abs) 08/26/2019 0.0  0.0 - 0.1 x10E3/uL Final   ]  Assessment:       1. Essential hypertension    2. Vitamin D deficiency    3. Gout, unspecified cause, unspecified chronicity, unspecified site    4. Familial hypercholesterolemia        Plan:       Hi was seen today for hypertension and hyperlipidemia.    Diagnoses and all orders for this visit:    Essential hypertension  Comments:  No adjustments for now as he hasn't taken his meds yet  Orders:  -     amLODIPine (NORVASC) 10 MG tablet; Take 1 tablet (10 mg total) by mouth every evening.  -     metoprolol succinate (TOPROL-XL) 50 MG 24 hr tablet; Take 1 tablet (50 mg total) by mouth once daily.  -     valsartan (DIOVAN) 320 MG tablet; Take 1 tablet (320 mg total) by mouth Daily. Take with food    Vitamin D deficiency  Comments:  Encouraged increased dietary vitamin D  Orders:  -     ergocalciferol (ERGOCALCIFEROL) 50,000 unit Cap; Take 1 capsule (50,000 Units total) by mouth every 7 days.    Gout, unspecified cause, unspecified chronicity, unspecified site  -     allopurinol (ZYLOPRIM) 300 MG tablet; Take 1 tablet (300 mg total) by mouth once daily.    Familial hypercholesterolemia  -     atorvastatin (LIPITOR) 20 MG tablet; Take 1 tablet (20 mg total) by mouth nightly.

## 2019-10-14 ENCOUNTER — OFFICE VISIT (OUTPATIENT)
Dept: FAMILY MEDICINE | Facility: CLINIC | Age: 49
End: 2019-10-14
Payer: COMMERCIAL

## 2019-10-14 VITALS
WEIGHT: 315 LBS | OXYGEN SATURATION: 95 % | HEIGHT: 65 IN | HEART RATE: 80 BPM | BODY MASS INDEX: 52.48 KG/M2 | DIASTOLIC BLOOD PRESSURE: 84 MMHG | TEMPERATURE: 98 F | SYSTOLIC BLOOD PRESSURE: 126 MMHG

## 2019-10-14 DIAGNOSIS — I10 ESSENTIAL HYPERTENSION: Primary | ICD-10-CM

## 2019-10-14 PROCEDURE — 3074F SYST BP LT 130 MM HG: CPT | Mod: S$GLB,,, | Performed by: INTERNAL MEDICINE

## 2019-10-14 PROCEDURE — 3079F PR MOST RECENT DIASTOLIC BLOOD PRESSURE 80-89 MM HG: ICD-10-PCS | Mod: S$GLB,,, | Performed by: INTERNAL MEDICINE

## 2019-10-14 PROCEDURE — 3074F PR MOST RECENT SYSTOLIC BLOOD PRESSURE < 130 MM HG: ICD-10-PCS | Mod: S$GLB,,, | Performed by: INTERNAL MEDICINE

## 2019-10-14 PROCEDURE — 99212 PR OFFICE/OUTPT VISIT, EST, LEVL II, 10-19 MIN: ICD-10-PCS | Mod: S$GLB,,, | Performed by: INTERNAL MEDICINE

## 2019-10-14 PROCEDURE — 3008F PR BODY MASS INDEX (BMI) DOCUMENTED: ICD-10-PCS | Mod: S$GLB,,, | Performed by: INTERNAL MEDICINE

## 2019-10-14 PROCEDURE — 99212 OFFICE O/P EST SF 10 MIN: CPT | Mod: S$GLB,,, | Performed by: INTERNAL MEDICINE

## 2019-10-14 PROCEDURE — 3008F BODY MASS INDEX DOCD: CPT | Mod: S$GLB,,, | Performed by: INTERNAL MEDICINE

## 2019-10-14 PROCEDURE — 3079F DIAST BP 80-89 MM HG: CPT | Mod: S$GLB,,, | Performed by: INTERNAL MEDICINE

## 2019-10-14 NOTE — PROGRESS NOTES
Subjective:       Patient ID: Hi Andres is a 48 y.o. male.    Chief Complaint: Hypertension (continuity of care)    Here for follow up on blood pressure.  He hadn't taken his meds at last visit so I asked him to come back in to be rechecked.  Turning 49 next month    Review of Systems   Constitutional: Negative for chills, fatigue, fever and unexpected weight change.   HENT: Negative for congestion, hearing loss, postnasal drip, rhinorrhea, trouble swallowing and voice change.    Eyes: Negative for photophobia and visual disturbance.   Respiratory: Positive for apnea. Negative for cough, choking, chest tightness, shortness of breath and wheezing.    Cardiovascular: Negative for chest pain, palpitations and leg swelling.   Gastrointestinal: Negative for abdominal pain, blood in stool, constipation, diarrhea, nausea, rectal pain and vomiting.   Endocrine: Negative for cold intolerance, heat intolerance, polydipsia and polyuria.   Genitourinary: Negative for decreased urine volume, difficulty urinating, discharge, dysuria, flank pain, frequency, genital sores, hematuria, testicular pain and urgency.   Musculoskeletal: Positive for arthralgias and back pain. Negative for gait problem, joint swelling, myalgias and neck pain.   Skin: Positive for color change. Negative for rash and wound.   Allergic/Immunologic: Negative for environmental allergies and food allergies.   Neurological: Negative for dizziness, tremors, seizures, syncope, facial asymmetry, speech difficulty, weakness, light-headedness, numbness and headaches.   Hematological: Negative for adenopathy. Does not bruise/bleed easily.   Psychiatric/Behavioral: Negative for confusion, hallucinations, sleep disturbance and suicidal ideas. The patient is not nervous/anxious.        Past Medical History:   Diagnosis Date    Anemia     Arthritis     Gout     Hyperlipidemia     Hypertension     EVGENY on CPAP     Vitamin D deficiency       Past Surgical  History:   Procedure Laterality Date    HERNIA REPAIR         Family History   Problem Relation Age of Onset    Arthritis Father     Hypertension Father     No Known Problems Mother     Prostate cancer Neg Hx     Colon cancer Neg Hx        Social History     Socioeconomic History    Marital status:      Spouse name: Not on file    Number of children: Not on file    Years of education: Not on file    Highest education level: Not on file   Occupational History    Not on file   Social Needs    Financial resource strain: Not on file    Food insecurity:     Worry: Not on file     Inability: Not on file    Transportation needs:     Medical: Not on file     Non-medical: Not on file   Tobacco Use    Smoking status: Never Smoker    Smokeless tobacco: Never Used   Substance and Sexual Activity    Alcohol use: Yes     Frequency: Monthly or less     Drinks per session: 1 or 2     Comment: monthly    Drug use: No    Sexual activity: Yes     Partners: Female   Lifestyle    Physical activity:     Days per week: Not on file     Minutes per session: Not on file    Stress: Not at all   Relationships    Social connections:     Talks on phone: Not on file     Gets together: Not on file     Attends L[employer] service: Not on file     Active member of club or organization: Not on file     Attends meetings of clubs or organizations: Not on file     Relationship status: Not on file   Other Topics Concern    Not on file   Social History Narrative    Live with wife       Current Outpatient Medications   Medication Sig Dispense Refill    allopurinol (ZYLOPRIM) 300 MG tablet Take 1 tablet (300 mg total) by mouth once daily. 90 tablet 1    amLODIPine (NORVASC) 10 MG tablet Take 1 tablet (10 mg total) by mouth every evening. 90 tablet 1    atorvastatin (LIPITOR) 20 MG tablet Take 1 tablet (20 mg total) by mouth nightly. 90 tablet 1    CIALIS 5 mg tablet TAKE ONE TABLET BY MOUTH ONCE DAILY 15 tablet 13     "ergocalciferol (ERGOCALCIFEROL) 50,000 unit Cap Take 1 capsule (50,000 Units total) by mouth every 7 days. 12 capsule 1    metoprolol succinate (TOPROL-XL) 50 MG 24 hr tablet Take 1 tablet (50 mg total) by mouth once daily. 90 tablet 1    multivitamin (MULTIPLE VITAMINS ORAL) Take 1 tablet by mouth once daily.      omega-3 fatty acids-fish oil (FISH OIL EXTRA STRENGTH) 435-880 mg Cap Take 1 capsule by mouth once daily.      valsartan (DIOVAN) 320 MG tablet Take 1 tablet (320 mg total) by mouth Daily. Take with food 90 tablet 1     No current facility-administered medications for this visit.        Review of patient's allergies indicates:  No Known Allergies  Objective:    HPI     Hypertension      Additional comments: continuity of care          Last edited by Jurgen Malhotra MA on 10/14/2019  9:31 AM. (History)      Blood pressure 126/84, pulse 80, temperature 98.2 °F (36.8 °C), temperature source Temporal, height 5' 5" (1.651 m), weight (!) 144.7 kg (319 lb), SpO2 95 %. Body mass index is 53.08 kg/m².   Physical Exam   Constitutional: He appears well-developed.   Cardiovascular: Normal rate, regular rhythm and normal heart sounds. Exam reveals no gallop and no friction rub.   No murmur heard.  Nursing note and vitals reviewed.          Assessment:       1. Essential hypertension        Plan:       Hi was seen today for hypertension.    Diagnoses and all orders for this visit:    Essential hypertension  Comments:  Weight up another 8 pounds; discussed weight loss             Discussed colonoscopy; referral next visit  "

## 2020-03-13 ENCOUNTER — OFFICE VISIT (OUTPATIENT)
Dept: FAMILY MEDICINE | Facility: CLINIC | Age: 50
End: 2020-03-13
Payer: COMMERCIAL

## 2020-03-13 VITALS
SYSTOLIC BLOOD PRESSURE: 140 MMHG | DIASTOLIC BLOOD PRESSURE: 84 MMHG | OXYGEN SATURATION: 98 % | HEIGHT: 65 IN | WEIGHT: 315 LBS | TEMPERATURE: 98 F | HEART RATE: 76 BPM | BODY MASS INDEX: 52.48 KG/M2

## 2020-03-13 DIAGNOSIS — G47.33 OSA ON CPAP: ICD-10-CM

## 2020-03-13 DIAGNOSIS — M10.9 GOUT, UNSPECIFIED CAUSE, UNSPECIFIED CHRONICITY, UNSPECIFIED SITE: ICD-10-CM

## 2020-03-13 DIAGNOSIS — Z12.11 COLON CANCER SCREENING: ICD-10-CM

## 2020-03-13 DIAGNOSIS — E78.01 FAMILIAL HYPERCHOLESTEROLEMIA: ICD-10-CM

## 2020-03-13 DIAGNOSIS — R60.0 BILATERAL LOWER EXTREMITY EDEMA: ICD-10-CM

## 2020-03-13 DIAGNOSIS — I10 ESSENTIAL HYPERTENSION: Primary | ICD-10-CM

## 2020-03-13 DIAGNOSIS — E55.9 VITAMIN D DEFICIENCY: ICD-10-CM

## 2020-03-13 DIAGNOSIS — Z12.5 PROSTATE CANCER SCREENING: ICD-10-CM

## 2020-03-13 PROCEDURE — 99214 PR OFFICE/OUTPT VISIT, EST, LEVL IV, 30-39 MIN: ICD-10-PCS | Mod: S$GLB,,, | Performed by: INTERNAL MEDICINE

## 2020-03-13 PROCEDURE — 3008F BODY MASS INDEX DOCD: CPT | Mod: S$GLB,,, | Performed by: INTERNAL MEDICINE

## 2020-03-13 PROCEDURE — 3077F PR MOST RECENT SYSTOLIC BLOOD PRESSURE >= 140 MM HG: ICD-10-PCS | Mod: S$GLB,,, | Performed by: INTERNAL MEDICINE

## 2020-03-13 PROCEDURE — 3079F DIAST BP 80-89 MM HG: CPT | Mod: S$GLB,,, | Performed by: INTERNAL MEDICINE

## 2020-03-13 PROCEDURE — 99214 OFFICE O/P EST MOD 30 MIN: CPT | Mod: S$GLB,,, | Performed by: INTERNAL MEDICINE

## 2020-03-13 PROCEDURE — 3077F SYST BP >= 140 MM HG: CPT | Mod: S$GLB,,, | Performed by: INTERNAL MEDICINE

## 2020-03-13 PROCEDURE — 3079F PR MOST RECENT DIASTOLIC BLOOD PRESSURE 80-89 MM HG: ICD-10-PCS | Mod: S$GLB,,, | Performed by: INTERNAL MEDICINE

## 2020-03-13 PROCEDURE — 3008F PR BODY MASS INDEX (BMI) DOCUMENTED: ICD-10-PCS | Mod: S$GLB,,, | Performed by: INTERNAL MEDICINE

## 2020-03-13 RX ORDER — IBUPROFEN 100 MG/5ML
1000 SUSPENSION, ORAL (FINAL DOSE FORM) ORAL DAILY
COMMUNITY

## 2020-03-13 RX ORDER — ERGOCALCIFEROL 1.25 MG/1
50000 CAPSULE ORAL
Qty: 12 CAPSULE | Refills: 1 | Status: SHIPPED | OUTPATIENT
Start: 2020-03-13 | End: 2020-08-13 | Stop reason: SDUPTHER

## 2020-03-13 RX ORDER — AMLODIPINE BESYLATE 10 MG/1
10 TABLET ORAL NIGHTLY
Qty: 90 TABLET | Refills: 1 | Status: SHIPPED | OUTPATIENT
Start: 2020-03-13 | End: 2020-08-13 | Stop reason: SDUPTHER

## 2020-03-13 RX ORDER — METOPROLOL SUCCINATE 50 MG/1
50 TABLET, EXTENDED RELEASE ORAL DAILY
Qty: 90 TABLET | Refills: 1 | Status: SHIPPED | OUTPATIENT
Start: 2020-03-13 | End: 2020-08-13 | Stop reason: SDUPTHER

## 2020-03-13 RX ORDER — ALLOPURINOL 300 MG/1
300 TABLET ORAL DAILY
Qty: 90 TABLET | Refills: 1 | Status: SHIPPED | OUTPATIENT
Start: 2020-03-13 | End: 2020-08-13 | Stop reason: SDUPTHER

## 2020-03-13 RX ORDER — VALSARTAN 320 MG/1
320 TABLET ORAL DAILY
Qty: 90 TABLET | Refills: 1 | Status: SHIPPED | OUTPATIENT
Start: 2020-03-13 | End: 2020-08-13 | Stop reason: SDUPTHER

## 2020-03-13 RX ORDER — ATORVASTATIN CALCIUM 20 MG/1
20 TABLET, FILM COATED ORAL NIGHTLY
Qty: 90 TABLET | Refills: 1 | Status: SHIPPED | OUTPATIENT
Start: 2020-03-13 | End: 2020-08-13 | Stop reason: SDUPTHER

## 2020-03-13 NOTE — PROGRESS NOTES
Subjective:       Patient ID: Hi Andres is a 49 y.o. male.    Chief Complaint: Hypertension (continuity of care) and Hyperlipidemia    Here for routine follow up and med refills.  No recent gout flares.  Reports regular use of CPAP and feels rested; has upcoming appointment with Dr. Boateng.      Hypertension   This is a chronic problem. The problem is controlled. Associated symptoms include peripheral edema. Pertinent negatives include no chest pain, headaches, neck pain, palpitations or shortness of breath. Risk factors for coronary artery disease include dyslipidemia, obesity and male gender. Past treatments include angiotensin blockers, calcium channel blockers and beta blockers (just started back his walking program). There are no compliance problems.  There is no history of angina, kidney disease, CAD/MI, CVA or heart failure.   Hyperlipidemia   This is a chronic problem. The problem is controlled. Recent lipid tests were reviewed and are normal. Exacerbating diseases include obesity. Pertinent negatives include no chest pain, myalgias or shortness of breath. Current antihyperlipidemic treatment includes statins. Risk factors for coronary artery disease include dyslipidemia, male sex, obesity and hypertension.     Review of Systems   Constitutional: Negative for chills, fatigue, fever and unexpected weight change.   HENT: Negative for congestion, hearing loss, postnasal drip, rhinorrhea, trouble swallowing and voice change.    Eyes: Negative for photophobia and visual disturbance.   Respiratory: Positive for apnea. Negative for cough, choking, chest tightness, shortness of breath and wheezing.    Cardiovascular: Negative for chest pain, palpitations and leg swelling.   Gastrointestinal: Negative for abdominal pain, blood in stool, constipation, diarrhea, nausea, rectal pain and vomiting.   Endocrine: Negative for cold intolerance, heat intolerance, polydipsia and polyuria.   Genitourinary: Negative  for decreased urine volume, difficulty urinating, discharge, dysuria, flank pain, frequency, genital sores, hematuria, testicular pain and urgency.   Musculoskeletal: Positive for arthralgias (knees). Negative for back pain, gait problem, joint swelling, myalgias and neck pain.   Skin: Positive for color change. Negative for rash and wound.   Allergic/Immunologic: Negative for environmental allergies and food allergies.   Neurological: Negative for dizziness, tremors, seizures, syncope, facial asymmetry, speech difficulty, weakness, light-headedness, numbness and headaches.   Hematological: Negative for adenopathy. Does not bruise/bleed easily.   Psychiatric/Behavioral: Negative for confusion, hallucinations, sleep disturbance and suicidal ideas. The patient is not nervous/anxious.        Past Medical History:   Diagnosis Date    Anemia     Arthritis     Gout     Hyperlipidemia     Hypertension     EVGENY on CPAP     Vitamin D deficiency       Past Surgical History:   Procedure Laterality Date    HERNIA REPAIR         Family History   Problem Relation Age of Onset    Arthritis Father     Hypertension Father     No Known Problems Mother     Prostate cancer Neg Hx     Colon cancer Neg Hx        Social History     Socioeconomic History    Marital status:      Spouse name: Not on file    Number of children: Not on file    Years of education: Not on file    Highest education level: Not on file   Occupational History    Not on file   Social Needs    Financial resource strain: Not on file    Food insecurity:     Worry: Not on file     Inability: Not on file    Transportation needs:     Medical: Not on file     Non-medical: Not on file   Tobacco Use    Smoking status: Never Smoker    Smokeless tobacco: Never Used   Substance and Sexual Activity    Alcohol use: Yes     Frequency: Monthly or less     Drinks per session: 1 or 2     Comment: monthly    Drug use: No    Sexual activity: Yes      Partners: Female   Lifestyle    Physical activity:     Days per week: Not on file     Minutes per session: Not on file    Stress: Not at all   Relationships    Social connections:     Talks on phone: Not on file     Gets together: Not on file     Attends Rastafarian service: Not on file     Active member of club or organization: Not on file     Attends meetings of clubs or organizations: Not on file     Relationship status: Not on file   Other Topics Concern    Not on file   Social History Narrative    Live with wife       Current Outpatient Medications   Medication Sig Dispense Refill    allopurinoL (ZYLOPRIM) 300 MG tablet Take 1 tablet (300 mg total) by mouth once daily. 90 tablet 1    amLODIPine (NORVASC) 10 MG tablet Take 1 tablet (10 mg total) by mouth every evening. 90 tablet 1    ascorbic acid, vitamin C, (VITAMIN C) 1000 MG tablet Take 1,000 mg by mouth once daily.      atorvastatin (LIPITOR) 20 MG tablet Take 1 tablet (20 mg total) by mouth nightly. 90 tablet 1    CIALIS 5 mg tablet TAKE ONE TABLET BY MOUTH ONCE DAILY 15 tablet 13    ergocalciferol (ERGOCALCIFEROL) 50,000 unit Cap Take 1 capsule (50,000 Units total) by mouth every 7 days. 12 capsule 1    metoprolol succinate (TOPROL-XL) 50 MG 24 hr tablet Take 1 tablet (50 mg total) by mouth once daily. 90 tablet 1    multivitamin (MULTIPLE VITAMINS ORAL) Take 1 tablet by mouth once daily.      omega-3 fatty acids-fish oil (FISH OIL EXTRA STRENGTH) 435-880 mg Cap Take 1 capsule by mouth once daily.      valsartan (DIOVAN) 320 MG tablet Take 1 tablet (320 mg total) by mouth Daily. Take with food 90 tablet 1     No current facility-administered medications for this visit.        Review of patient's allergies indicates:  No Known Allergies  Objective:    HPI     Hypertension      Additional comments: continuity of care          Last edited by Jurgen Malhotra MA on 3/13/2020  9:45 AM. (History)      Blood pressure (!) 140/84, pulse 76, temperature  "97.9 °F (36.6 °C), temperature source Temporal, height 5' 5" (1.651 m), weight (!) 146.5 kg (323 lb), SpO2 98 %. Body mass index is 53.75 kg/m².   Physical Exam   Constitutional: He appears well-developed. No distress.   Obese     HENT:   Nose: Nose normal.   Mouth/Throat: Oropharynx is clear and moist.   Eyes: Conjunctivae are normal. Right eye exhibits no discharge. Left eye exhibits no discharge. No scleral icterus.   Neck: Carotid bruit is not present.   Cardiovascular: Normal rate, regular rhythm and normal heart sounds.   No murmur heard.  Pulmonary/Chest: Effort normal and breath sounds normal. No respiratory distress. He has no decreased breath sounds. He has no wheezes. He has no rhonchi. He has no rales.   Abdominal: Soft. He exhibits no distension. There is no tenderness. There is no rebound and no guarding. A hernia (umbilical) is present.   Musculoskeletal: He exhibits edema (1+).   Neurological: He is alert. He displays no tremor.   Skin: Skin is warm and dry. Rash (hyperpigmentation of lower extremities which is likely stasis dermatitis) noted.        Psychiatric: He has a normal mood and affect. His speech is normal.   Nursing note and vitals reviewed.          Assessment:       1. Essential hypertension    2. Familial hypercholesterolemia    3. Gout, unspecified cause, unspecified chronicity, unspecified site    4. Vitamin D deficiency    5. Colon cancer screening    6. Prostate cancer screening    7. EVGENY on CPAP    8. Bilateral lower extremity edema        Plan:       Hi was seen today for hypertension and hyperlipidemia.    Diagnoses and all orders for this visit:    Essential hypertension  Comments:  No adjustments for now.  Encouraged continued activity, weight loss    Orders:  -     amLODIPine (NORVASC) 10 MG tablet; Take 1 tablet (10 mg total) by mouth every evening.  -     metoprolol succinate (TOPROL-XL) 50 MG 24 hr tablet; Take 1 tablet (50 mg total) by mouth once daily.  -     " valsartan (DIOVAN) 320 MG tablet; Take 1 tablet (320 mg total) by mouth Daily. Take with food  -     Comprehensive metabolic panel; Future  -     Lipid panel; Future  -     Comprehensive metabolic panel  -     Lipid panel    Familial hypercholesterolemia  -     atorvastatin (LIPITOR) 20 MG tablet; Take 1 tablet (20 mg total) by mouth nightly.  -     Comprehensive metabolic panel; Future  -     Lipid panel; Future  -     Comprehensive metabolic panel  -     Lipid panel    Gout, unspecified cause, unspecified chronicity, unspecified site  -     allopurinoL (ZYLOPRIM) 300 MG tablet; Take 1 tablet (300 mg total) by mouth once daily.    Vitamin D deficiency  Comments:  Encouraged increased dietary vitamin D  Orders:  -     ergocalciferol (ERGOCALCIFEROL) 50,000 unit Cap; Take 1 capsule (50,000 Units total) by mouth every 7 days.  -     Vitamin D; Future  -     Vitamin D    Colon cancer screening  -     Ambulatory referral/consult to Gastroenterology; Future    Prostate cancer screening  -     PSA, Screening; Future  -     PSA, Screening    EGVENY on CPAP  -     CBC auto differential; Future  -     CBC auto differential    Bilateral lower extremity edema  Comments:  Discussed compression socks

## 2020-08-13 ENCOUNTER — OFFICE VISIT (OUTPATIENT)
Dept: FAMILY MEDICINE | Facility: CLINIC | Age: 50
End: 2020-08-13
Payer: COMMERCIAL

## 2020-08-13 VITALS
WEIGHT: 307 LBS | DIASTOLIC BLOOD PRESSURE: 86 MMHG | SYSTOLIC BLOOD PRESSURE: 150 MMHG | TEMPERATURE: 98 F | OXYGEN SATURATION: 97 % | BODY MASS INDEX: 51.15 KG/M2 | HEIGHT: 65 IN | HEART RATE: 75 BPM

## 2020-08-13 DIAGNOSIS — I10 ESSENTIAL HYPERTENSION: Primary | ICD-10-CM

## 2020-08-13 DIAGNOSIS — E78.01 FAMILIAL HYPERCHOLESTEROLEMIA: ICD-10-CM

## 2020-08-13 DIAGNOSIS — M10.9 GOUT, UNSPECIFIED CAUSE, UNSPECIFIED CHRONICITY, UNSPECIFIED SITE: ICD-10-CM

## 2020-08-13 DIAGNOSIS — E55.9 VITAMIN D DEFICIENCY: ICD-10-CM

## 2020-08-13 PROCEDURE — 3008F PR BODY MASS INDEX (BMI) DOCUMENTED: ICD-10-PCS | Mod: S$GLB,,, | Performed by: INTERNAL MEDICINE

## 2020-08-13 PROCEDURE — 3079F PR MOST RECENT DIASTOLIC BLOOD PRESSURE 80-89 MM HG: ICD-10-PCS | Mod: S$GLB,,, | Performed by: INTERNAL MEDICINE

## 2020-08-13 PROCEDURE — 99214 OFFICE O/P EST MOD 30 MIN: CPT | Mod: S$GLB,,, | Performed by: INTERNAL MEDICINE

## 2020-08-13 PROCEDURE — 3008F BODY MASS INDEX DOCD: CPT | Mod: S$GLB,,, | Performed by: INTERNAL MEDICINE

## 2020-08-13 PROCEDURE — 3077F PR MOST RECENT SYSTOLIC BLOOD PRESSURE >= 140 MM HG: ICD-10-PCS | Mod: S$GLB,,, | Performed by: INTERNAL MEDICINE

## 2020-08-13 PROCEDURE — 99214 PR OFFICE/OUTPT VISIT, EST, LEVL IV, 30-39 MIN: ICD-10-PCS | Mod: S$GLB,,, | Performed by: INTERNAL MEDICINE

## 2020-08-13 PROCEDURE — 3079F DIAST BP 80-89 MM HG: CPT | Mod: S$GLB,,, | Performed by: INTERNAL MEDICINE

## 2020-08-13 PROCEDURE — 3077F SYST BP >= 140 MM HG: CPT | Mod: S$GLB,,, | Performed by: INTERNAL MEDICINE

## 2020-08-13 RX ORDER — ALLOPURINOL 300 MG/1
300 TABLET ORAL DAILY
Qty: 90 TABLET | Refills: 1 | Status: SHIPPED | OUTPATIENT
Start: 2020-08-13 | End: 2020-12-22 | Stop reason: SDUPTHER

## 2020-08-13 RX ORDER — TADALAFIL 5 MG/1
5 TABLET ORAL DAILY
Qty: 15 TABLET | Refills: 13 | Status: CANCELLED | OUTPATIENT
Start: 2020-08-13

## 2020-08-13 RX ORDER — VALSARTAN 320 MG/1
320 TABLET ORAL DAILY
Qty: 90 TABLET | Refills: 1 | Status: SHIPPED | OUTPATIENT
Start: 2020-08-13 | End: 2020-12-22 | Stop reason: SDUPTHER

## 2020-08-13 RX ORDER — ERGOCALCIFEROL 1.25 MG/1
50000 CAPSULE ORAL
Qty: 12 CAPSULE | Refills: 1 | Status: SHIPPED | OUTPATIENT
Start: 2020-08-13 | End: 2021-02-17 | Stop reason: SDUPTHER

## 2020-08-13 RX ORDER — ATORVASTATIN CALCIUM 20 MG/1
20 TABLET, FILM COATED ORAL NIGHTLY
Qty: 90 TABLET | Refills: 1 | Status: SHIPPED | OUTPATIENT
Start: 2020-08-13 | End: 2020-12-22 | Stop reason: SDUPTHER

## 2020-08-13 RX ORDER — METOPROLOL SUCCINATE 100 MG/1
100 TABLET, EXTENDED RELEASE ORAL DAILY
Qty: 90 TABLET | Refills: 1 | Status: SHIPPED | OUTPATIENT
Start: 2020-08-13 | End: 2020-12-22 | Stop reason: SDUPTHER

## 2020-08-13 RX ORDER — AMLODIPINE BESYLATE 10 MG/1
10 TABLET ORAL NIGHTLY
Qty: 90 TABLET | Refills: 1 | Status: SHIPPED | OUTPATIENT
Start: 2020-08-13 | End: 2020-12-22 | Stop reason: SDUPTHER

## 2020-08-13 NOTE — PROGRESS NOTES
Subjective:       Patient ID: Hi Andres is a 49 y.o. male.    Chief Complaint: Hypertension (5 months followup) and Hyperlipidemia    Here for routine follow up and med refills.  No recent gout flares.  Reports regular use of CPAP and feels rested.  He hasn't had colonoscopy or labs d/t concerns about COVID.    Hypertension  This is a chronic problem. Condition status: not monitoring at home; forgot meds this AM. Associated symptoms include peripheral edema. Pertinent negatives include no anxiety, chest pain, headaches, malaise/fatigue, neck pain, palpitations or shortness of breath. Risk factors for coronary artery disease include dyslipidemia, obesity and male gender. Past treatments include angiotensin blockers, calcium channel blockers and beta blockers. Compliance problems include exercise (hasn't been exercising but watching diet better and weight down 15+ pounds).  There is no history of angina, kidney disease, CAD/MI, CVA or heart failure.   Hyperlipidemia  This is a chronic problem. The problem is controlled. Recent lipid tests were reviewed and are normal (but last labs were a year ago; he has orders in chart). Exacerbating diseases include obesity. Associated symptoms include myalgias. Pertinent negatives include no chest pain or shortness of breath. Current antihyperlipidemic treatment includes statins. The current treatment provides significant improvement of lipids. Risk factors for coronary artery disease include dyslipidemia, male sex, obesity and hypertension.     Review of Systems   Constitutional: Negative for chills, fatigue, fever, malaise/fatigue and unexpected weight change.   HENT: Negative for congestion, hearing loss, postnasal drip, rhinorrhea, trouble swallowing and voice change.    Eyes: Negative for photophobia and visual disturbance.   Respiratory: Positive for apnea. Negative for cough, choking, chest tightness, shortness of breath and wheezing.    Cardiovascular: Negative  for chest pain, palpitations and leg swelling.   Gastrointestinal: Negative for abdominal pain, blood in stool, constipation, diarrhea, nausea, rectal pain and vomiting.   Endocrine: Negative for cold intolerance, heat intolerance, polydipsia and polyuria.   Genitourinary: Negative for decreased urine volume, difficulty urinating, discharge, dysuria, flank pain, frequency, genital sores, hematuria, testicular pain and urgency.   Musculoskeletal: Positive for arthralgias and myalgias. Negative for back pain, gait problem, joint swelling and neck pain.   Skin: Positive for color change. Negative for rash and wound.   Allergic/Immunologic: Negative for environmental allergies and food allergies.   Neurological: Negative for dizziness, tremors, seizures, syncope, facial asymmetry, speech difficulty, weakness, light-headedness, numbness and headaches.   Hematological: Negative for adenopathy. Does not bruise/bleed easily.   Psychiatric/Behavioral: Negative for confusion, hallucinations, sleep disturbance and suicidal ideas. The patient is not nervous/anxious.        Past Medical History:   Diagnosis Date    Anemia     Arthritis     Gout     Hyperlipidemia     Hypertension     EVGENY on CPAP     Vitamin D deficiency       Past Surgical History:   Procedure Laterality Date    HERNIA REPAIR         Family History   Problem Relation Age of Onset    Arthritis Father     Hypertension Father     No Known Problems Mother     Prostate cancer Neg Hx     Colon cancer Neg Hx        Social History     Socioeconomic History    Marital status:      Spouse name: Not on file    Number of children: Not on file    Years of education: Not on file    Highest education level: Not on file   Occupational History    Not on file   Social Needs    Financial resource strain: Not on file    Food insecurity     Worry: Not on file     Inability: Not on file    Transportation needs     Medical: Not on file     Non-medical: Not  on file   Tobacco Use    Smoking status: Never Smoker    Smokeless tobacco: Never Used   Substance and Sexual Activity    Alcohol use: Yes     Frequency: Monthly or less     Drinks per session: 1 or 2     Comment: monthly    Drug use: No    Sexual activity: Yes     Partners: Female   Lifestyle    Physical activity     Days per week: Not on file     Minutes per session: Not on file    Stress: Not at all   Relationships    Social connections     Talks on phone: Not on file     Gets together: Not on file     Attends Sabianism service: Not on file     Active member of club or organization: Not on file     Attends meetings of clubs or organizations: Not on file     Relationship status: Not on file   Other Topics Concern    Not on file   Social History Narrative    Live with wife       Current Outpatient Medications   Medication Sig Dispense Refill    allopurinoL (ZYLOPRIM) 300 MG tablet Take 1 tablet (300 mg total) by mouth once daily. 90 tablet 1    amLODIPine (NORVASC) 10 MG tablet Take 1 tablet (10 mg total) by mouth every evening. 90 tablet 1    ascorbic acid, vitamin C, (VITAMIN C) 1000 MG tablet Take 1,000 mg by mouth once daily.      atorvastatin (LIPITOR) 20 MG tablet Take 1 tablet (20 mg total) by mouth nightly. 90 tablet 1    CIALIS 5 mg tablet TAKE ONE TABLET BY MOUTH ONCE DAILY 15 tablet 13    ergocalciferol (ERGOCALCIFEROL) 50,000 unit Cap Take 1 capsule (50,000 Units total) by mouth every 7 days. 12 capsule 1    metoprolol succinate (TOPROL-XL) 100 MG 24 hr tablet Take 1 tablet (100 mg total) by mouth once daily. 90 tablet 1    multivitamin (MULTIPLE VITAMINS ORAL) Take 1 tablet by mouth once daily.      omega-3 fatty acids-fish oil (FISH OIL EXTRA STRENGTH) 435-880 mg Cap Take 1 capsule by mouth once daily.      valsartan (DIOVAN) 320 MG tablet Take 1 tablet (320 mg total) by mouth Daily. Take with food 90 tablet 1     No current facility-administered medications for this visit.   "      Review of patient's allergies indicates:  No Known Allergies  Objective:    HPI     Hypertension      Additional comments: 5 months followup          Last edited by Jurgen Malhotra MA on 8/13/2020  9:49 AM. (History)      Blood pressure (!) 150/86, pulse 75, temperature 97.9 °F (36.6 °C), temperature source Temporal, height 5' 5" (1.651 m), weight (!) 139.3 kg (307 lb), SpO2 97 %. Body mass index is 51.09 kg/m².   Physical Exam  Vitals signs and nursing note reviewed.   Constitutional:       General: He is not in acute distress.     Appearance: He is well-developed.      Comments: Obese     HENT:      Nose: Nose normal.   Eyes:      General: No scleral icterus.        Right eye: No discharge.         Left eye: No discharge.      Conjunctiva/sclera: Conjunctivae normal.   Neck:      Vascular: No carotid bruit.   Cardiovascular:      Rate and Rhythm: Normal rate and regular rhythm.      Heart sounds: Normal heart sounds. No murmur.   Pulmonary:      Effort: Pulmonary effort is normal. No respiratory distress.      Breath sounds: Normal breath sounds. No decreased breath sounds, wheezing, rhonchi or rales.   Abdominal:      General: There is no distension.      Palpations: Abdomen is soft.      Tenderness: There is no abdominal tenderness. There is no guarding or rebound.      Hernia: A hernia (umbilical) is present.   Musculoskeletal:      Right lower leg: Edema (1+) present.      Left lower leg: Edema (1+) present.   Skin:     General: Skin is warm and dry.      Findings: Rash (hyperpigmentation of lower extremities which is likely stasis dermatitis) present.      Comments:      Neurological:      Mental Status: He is alert.      Motor: No tremor.   Psychiatric:         Speech: Speech normal.             Assessment:       1. Essential hypertension    2. Gout, unspecified cause, unspecified chronicity, unspecified site    3. Familial hypercholesterolemia    4. Vitamin D deficiency        Plan:       Hi was " seen today for hypertension and hyperlipidemia.    Diagnoses and all orders for this visit:    Essential hypertension  Comments:  Hasn't taken meds today but would likely benefit from increase in toprol as he was high last visit also  Orders:  -     amLODIPine (NORVASC) 10 MG tablet; Take 1 tablet (10 mg total) by mouth every evening.  -     valsartan (DIOVAN) 320 MG tablet; Take 1 tablet (320 mg total) by mouth Daily. Take with food  -     metoprolol succinate (TOPROL-XL) 100 MG 24 hr tablet; Take 1 tablet (100 mg total) by mouth once daily.    Gout, unspecified cause, unspecified chronicity, unspecified site  -     allopurinoL (ZYLOPRIM) 300 MG tablet; Take 1 tablet (300 mg total) by mouth once daily.    Familial hypercholesterolemia  -     atorvastatin (LIPITOR) 20 MG tablet; Take 1 tablet (20 mg total) by mouth nightly.    Vitamin D deficiency  -     ergocalciferol (ERGOCALCIFEROL) 50,000 unit Cap; Take 1 capsule (50,000 Units total) by mouth every 7 days.    Other orders  -     Cancel: tadalafiL (CIALIS) 5 MG tablet; Take 1 tablet (5 mg total) by mouth once daily.           Encouraged labs, colonoscopy.

## 2020-08-18 ENCOUNTER — TELEPHONE (OUTPATIENT)
Dept: FAMILY MEDICINE | Facility: CLINIC | Age: 50
End: 2020-08-18

## 2020-08-18 LAB
25(OH)D3+25(OH)D2 SERPL-MCNC: 46.6 NG/ML (ref 30–100)
ALBUMIN SERPL-MCNC: 4.4 G/DL (ref 4–5)
ALBUMIN/GLOB SERPL: 1.6 {RATIO} (ref 1.2–2.2)
ALP SERPL-CCNC: 124 IU/L (ref 39–117)
ALT SERPL-CCNC: 17 IU/L (ref 0–44)
AST SERPL-CCNC: 17 IU/L (ref 0–40)
BASOPHILS # BLD AUTO: 0 X10E3/UL (ref 0–0.2)
BASOPHILS NFR BLD AUTO: 1 %
BILIRUB SERPL-MCNC: 0.8 MG/DL (ref 0–1.2)
BUN SERPL-MCNC: 13 MG/DL (ref 6–24)
BUN/CREAT SERPL: 16 (ref 9–20)
CALCIUM SERPL-MCNC: 9.3 MG/DL (ref 8.7–10.2)
CHLORIDE SERPL-SCNC: 106 MMOL/L (ref 96–106)
CHOLEST SERPL-MCNC: 117 MG/DL (ref 100–199)
CO2 SERPL-SCNC: 22 MMOL/L (ref 20–29)
CREAT SERPL-MCNC: 0.79 MG/DL (ref 0.76–1.27)
EOSINOPHIL # BLD AUTO: 0.2 X10E3/UL (ref 0–0.4)
EOSINOPHIL NFR BLD AUTO: 2 %
ERYTHROCYTE [DISTWIDTH] IN BLOOD BY AUTOMATED COUNT: 13 % (ref 11.6–15.4)
GLOBULIN SER CALC-MCNC: 2.7 G/DL (ref 1.5–4.5)
GLUCOSE SERPL-MCNC: 95 MG/DL (ref 65–99)
HCT VFR BLD AUTO: 36.6 % (ref 37.5–51)
HDLC SERPL-MCNC: 33 MG/DL
HGB BLD-MCNC: 11.3 G/DL (ref 13–17.7)
IMM GRANULOCYTES # BLD AUTO: 0 X10E3/UL (ref 0–0.1)
IMM GRANULOCYTES NFR BLD AUTO: 0 %
LDLC SERPL CALC-MCNC: 64 MG/DL (ref 0–99)
LYMPHOCYTES # BLD AUTO: 1.4 X10E3/UL (ref 0.7–3.1)
LYMPHOCYTES NFR BLD AUTO: 20 %
MCH RBC QN AUTO: 28.5 PG (ref 26.6–33)
MCHC RBC AUTO-ENTMCNC: 30.9 G/DL (ref 31.5–35.7)
MCV RBC AUTO: 92 FL (ref 79–97)
MONOCYTES # BLD AUTO: 0.6 X10E3/UL (ref 0.1–0.9)
MONOCYTES NFR BLD AUTO: 8 %
NEUTROPHILS # BLD AUTO: 5.1 X10E3/UL (ref 1.4–7)
NEUTROPHILS NFR BLD AUTO: 69 %
PLATELET # BLD AUTO: 427 X10E3/UL (ref 150–450)
POTASSIUM SERPL-SCNC: 4 MMOL/L (ref 3.5–5.2)
PROT SERPL-MCNC: 7.1 G/DL (ref 6–8.5)
PSA SERPL-MCNC: 0.5 NG/ML (ref 0–4)
RBC # BLD AUTO: 3.97 X10E6/UL (ref 4.14–5.8)
SODIUM SERPL-SCNC: 140 MMOL/L (ref 134–144)
TRIGL SERPL-MCNC: 99 MG/DL (ref 0–149)
VLDLC SERPL CALC-MCNC: 20 MG/DL (ref 5–40)
WBC # BLD AUTO: 7.3 X10E3/UL (ref 3.4–10.8)

## 2020-08-18 NOTE — TELEPHONE ENCOUNTER
----- Message from Luis Camacho Jr., MD sent at 8/18/2020  1:02 PM CDT -----  Please call the patient regarding his result.  Overall, labs look okay.  He does have a mild anemia which we will monitor

## 2020-10-09 RX ORDER — TADALAFIL 5 MG/1
5 TABLET ORAL DAILY
Qty: 15 TABLET | Refills: 13 | Status: SHIPPED | OUTPATIENT
Start: 2020-10-09 | End: 2021-10-14

## 2020-11-13 ENCOUNTER — OFFICE VISIT (OUTPATIENT)
Dept: FAMILY MEDICINE | Facility: CLINIC | Age: 50
End: 2020-11-13
Payer: COMMERCIAL

## 2020-11-13 VITALS
TEMPERATURE: 98 F | SYSTOLIC BLOOD PRESSURE: 124 MMHG | WEIGHT: 302 LBS | DIASTOLIC BLOOD PRESSURE: 70 MMHG | BODY MASS INDEX: 50.31 KG/M2 | HEART RATE: 76 BPM | HEIGHT: 65 IN | OXYGEN SATURATION: 98 %

## 2020-11-13 DIAGNOSIS — E55.9 VITAMIN D DEFICIENCY: ICD-10-CM

## 2020-11-13 DIAGNOSIS — I10 ESSENTIAL HYPERTENSION: Primary | ICD-10-CM

## 2020-11-13 DIAGNOSIS — D64.9 NORMOCYTIC ANEMIA: ICD-10-CM

## 2020-11-13 DIAGNOSIS — E78.01 FAMILIAL HYPERCHOLESTEROLEMIA: ICD-10-CM

## 2020-11-13 DIAGNOSIS — Z12.11 COLON CANCER SCREENING: ICD-10-CM

## 2020-11-13 DIAGNOSIS — G47.33 OSA ON CPAP: ICD-10-CM

## 2020-11-13 PROCEDURE — 99214 PR OFFICE/OUTPT VISIT, EST, LEVL IV, 30-39 MIN: ICD-10-PCS | Mod: S$GLB,,, | Performed by: INTERNAL MEDICINE

## 2020-11-13 PROCEDURE — 3074F SYST BP LT 130 MM HG: CPT | Mod: S$GLB,,, | Performed by: INTERNAL MEDICINE

## 2020-11-13 PROCEDURE — 1126F PR PAIN SEVERITY QUANTIFIED, NO PAIN PRESENT: ICD-10-PCS | Mod: S$GLB,,, | Performed by: INTERNAL MEDICINE

## 2020-11-13 PROCEDURE — 99214 OFFICE O/P EST MOD 30 MIN: CPT | Mod: S$GLB,,, | Performed by: INTERNAL MEDICINE

## 2020-11-13 PROCEDURE — 3078F PR MOST RECENT DIASTOLIC BLOOD PRESSURE < 80 MM HG: ICD-10-PCS | Mod: S$GLB,,, | Performed by: INTERNAL MEDICINE

## 2020-11-13 PROCEDURE — 1126F AMNT PAIN NOTED NONE PRSNT: CPT | Mod: S$GLB,,, | Performed by: INTERNAL MEDICINE

## 2020-11-13 PROCEDURE — 3078F DIAST BP <80 MM HG: CPT | Mod: S$GLB,,, | Performed by: INTERNAL MEDICINE

## 2020-11-13 PROCEDURE — 3008F BODY MASS INDEX DOCD: CPT | Mod: S$GLB,,, | Performed by: INTERNAL MEDICINE

## 2020-11-13 PROCEDURE — 3074F PR MOST RECENT SYSTOLIC BLOOD PRESSURE < 130 MM HG: ICD-10-PCS | Mod: S$GLB,,, | Performed by: INTERNAL MEDICINE

## 2020-11-13 PROCEDURE — 3008F PR BODY MASS INDEX (BMI) DOCUMENTED: ICD-10-PCS | Mod: S$GLB,,, | Performed by: INTERNAL MEDICINE

## 2020-11-13 RX ORDER — INFLUENZA A VIRUS A/NEBRASKA/14/2019 (H1N1) ANTIGEN (MDCK CELL DERIVED, PROPIOLACTONE INACTIVATED), INFLUENZA A VIRUS A/DELAWARE/39/2019 (H3N2) ANTIGEN (MDCK CELL DERIVED, PROPIOLACTONE INACTIVATED), INFLUENZA B VIRUS B/SINGAPORE/INFTT-16-0610/2016 ANTIGEN (MDCK CELL DERIVED, PROPIOLACTONE INACTIVATED), INFLUENZA B VIRUS B/DARWIN/7/2019 ANTIGEN (MDCK CELL DERIVED, PROPIOLACTONE INACTIVATED) 15; 15; 15; 15 UG/.5ML; UG/.5ML; UG/.5ML; UG/.5ML
INJECTION, SUSPENSION INTRAMUSCULAR
COMMUNITY
Start: 2020-10-31 | End: 2020-11-13 | Stop reason: ALTCHOICE

## 2020-11-13 NOTE — PROGRESS NOTES
Subjective:       Patient ID: Hi Andres is a 50 y.o. male.    Chief Complaint: Hypertension (3 months followup) and Hyperlipidemia    Here for routine follow up and med refills.  No recent gout flares.  Reports regular use of CPAP and feels rested.  He hasn't had colonoscopy d/t concerns about COVID.    Hypertension  This is a chronic problem. The problem is controlled. Associated symptoms include peripheral edema. Pertinent negatives include no anxiety, chest pain, headaches, malaise/fatigue, neck pain, palpitations or shortness of breath. (Weight down 5 pounds) Risk factors for coronary artery disease include dyslipidemia, obesity and male gender. Past treatments include angiotensin blockers, calcium channel blockers and beta blockers. There is no history of angina, kidney disease, CAD/MI, CVA or heart failure.   Hyperlipidemia  This is a chronic problem. The problem is controlled. Recent lipid tests were reviewed and are normal. Exacerbating diseases include obesity. Pertinent negatives include no chest pain, myalgias or shortness of breath. Current antihyperlipidemic treatment includes statins. The current treatment provides significant improvement of lipids. Risk factors for coronary artery disease include dyslipidemia, male sex, obesity and hypertension.     Review of Systems   Constitutional: Negative for chills, fatigue, fever, malaise/fatigue and unexpected weight change.   HENT: Negative for congestion, hearing loss, postnasal drip, rhinorrhea, trouble swallowing and voice change.    Eyes: Negative for photophobia and visual disturbance.   Respiratory: Negative for apnea, cough, choking, chest tightness, shortness of breath and wheezing.    Cardiovascular: Negative for chest pain, palpitations and leg swelling.   Gastrointestinal: Negative for abdominal pain, blood in stool, constipation, diarrhea, nausea, rectal pain and vomiting.   Endocrine: Negative for cold intolerance, heat intolerance,  polydipsia and polyuria.   Genitourinary: Negative for decreased urine volume, difficulty urinating, discharge, dysuria, flank pain, frequency, genital sores, hematuria, testicular pain and urgency.   Musculoskeletal: Positive for arthralgias. Negative for back pain, gait problem, joint swelling, myalgias and neck pain.   Skin: Positive for color change (legs). Negative for rash and wound.   Allergic/Immunologic: Negative for environmental allergies and food allergies.   Neurological: Negative for dizziness, tremors, seizures, syncope, facial asymmetry, speech difficulty, weakness, light-headedness, numbness and headaches.   Hematological: Negative for adenopathy. Does not bruise/bleed easily.   Psychiatric/Behavioral: Negative for confusion, hallucinations, sleep disturbance and suicidal ideas. The patient is not nervous/anxious.        Past Medical History:   Diagnosis Date    Anemia     Arthritis     Gout     Hyperlipidemia     Hypertension     EVGENY on CPAP     Vitamin D deficiency       Past Surgical History:   Procedure Laterality Date    HERNIA REPAIR         Family History   Problem Relation Age of Onset    Arthritis Father     Hypertension Father     No Known Problems Mother     Prostate cancer Neg Hx     Colon cancer Neg Hx        Social History     Socioeconomic History    Marital status:      Spouse name: Not on file    Number of children: Not on file    Years of education: Not on file    Highest education level: Not on file   Occupational History    Not on file   Social Needs    Financial resource strain: Not on file    Food insecurity     Worry: Not on file     Inability: Not on file    Transportation needs     Medical: Not on file     Non-medical: Not on file   Tobacco Use    Smoking status: Never Smoker    Smokeless tobacco: Never Used   Substance and Sexual Activity    Alcohol use: Yes     Frequency: Monthly or less     Drinks per session: 1 or 2     Comment: monthly     Drug use: No    Sexual activity: Yes     Partners: Female   Lifestyle    Physical activity     Days per week: Not on file     Minutes per session: Not on file    Stress: Not at all   Relationships    Social connections     Talks on phone: Not on file     Gets together: Not on file     Attends Yazidi service: Not on file     Active member of club or organization: Not on file     Attends meetings of clubs or organizations: Not on file     Relationship status: Not on file   Other Topics Concern    Not on file   Social History Narrative    Live with wife       Current Outpatient Medications   Medication Sig Dispense Refill    allopurinoL (ZYLOPRIM) 300 MG tablet Take 1 tablet (300 mg total) by mouth once daily. 90 tablet 1    amLODIPine (NORVASC) 10 MG tablet Take 1 tablet (10 mg total) by mouth every evening. 90 tablet 1    ascorbic acid, vitamin C, (VITAMIN C) 1000 MG tablet Take 1,000 mg by mouth once daily.      atorvastatin (LIPITOR) 20 MG tablet Take 1 tablet (20 mg total) by mouth nightly. 90 tablet 1    ergocalciferol (ERGOCALCIFEROL) 50,000 unit Cap Take 1 capsule (50,000 Units total) by mouth every 7 days. 12 capsule 1    metoprolol succinate (TOPROL-XL) 100 MG 24 hr tablet Take 1 tablet (100 mg total) by mouth once daily. 90 tablet 1    multivitamin (MULTIPLE VITAMINS ORAL) Take 1 tablet by mouth once daily.      omega-3 fatty acids-fish oil (FISH OIL EXTRA STRENGTH) 435-880 mg Cap Take 1 capsule by mouth once daily.      tadalafiL (CIALIS) 5 MG tablet Take 1 tablet (5 mg total) by mouth once daily. 15 tablet 13    valsartan (DIOVAN) 320 MG tablet Take 1 tablet (320 mg total) by mouth Daily. Take with food 90 tablet 1     No current facility-administered medications for this visit.        Review of patient's allergies indicates:  No Known Allergies  Objective:    HPI     Hypertension      Additional comments: 3 months followup          Last edited by Jurgen Malhotra MA on 11/13/2020  9:50  "AM. (History)      Blood pressure 124/70, pulse 76, temperature 98.4 °F (36.9 °C), temperature source Temporal, height 5' 5" (1.651 m), weight (!) 137 kg (302 lb), SpO2 98 %. Body mass index is 50.26 kg/m².   Physical Exam      No visits with results within 3 Month(s) from this visit.   Latest known visit with results is:   Office Visit on 03/13/2020   Component Date Value Ref Range Status    Glucose 08/17/2020 95  65 - 99 mg/dL Final    BUN 08/17/2020 13  6 - 24 mg/dL Final    Creatinine 08/17/2020 0.79  0.76 - 1.27 mg/dL Final    eGFR if non African American 08/17/2020 105  >59 mL/min/1.73 Final    eGFR if  08/17/2020 121  >59 mL/min/1.73 Final    BUN/Creatinine Ratio 08/17/2020 16  9 - 20 Final    Sodium 08/17/2020 140  134 - 144 mmol/L Final    Potassium 08/17/2020 4.0  3.5 - 5.2 mmol/L Final    Chloride 08/17/2020 106  96 - 106 mmol/L Final    CO2 08/17/2020 22  20 - 29 mmol/L Final    Calcium 08/17/2020 9.3  8.7 - 10.2 mg/dL Final    Protein, Total 08/17/2020 7.1  6.0 - 8.5 g/dL Final    Albumin 08/17/2020 4.4  4.0 - 5.0 g/dL Final    Globulin, Total 08/17/2020 2.7  1.5 - 4.5 g/dL Final    Albumin/Globulin Ratio 08/17/2020 1.6  1.2 - 2.2 Final    Total Bilirubin 08/17/2020 0.8  0.0 - 1.2 mg/dL Final    Alkaline Phosphatase 08/17/2020 124* 39 - 117 IU/L Final    AST 08/17/2020 17  0 - 40 IU/L Final    ALT 08/17/2020 17  0 - 44 IU/L Final    Cholesterol 08/17/2020 117  100 - 199 mg/dL Final    Triglycerides 08/17/2020 99  0 - 149 mg/dL Final    HDL 08/17/2020 33* >39 mg/dL Final    VLDL Cholesterol Phillip 08/17/2020 20  5 - 40 mg/dL Final    LDL Calculated 08/17/2020 64  0 - 99 mg/dL Final    Vit D, 25-Hydroxy 08/17/2020 46.6  30.0 - 100.0 ng/mL Final    Comment: Vitamin D deficiency has been defined by the [place] of  [employer] and an Endocrine Society practice guideline as a  level of serum 25-OH vitamin D less than 20 ng/mL (1,2).  The Endocrine Society went on to " further define vitamin D  insufficiency as a level between 21 and 29 ng/mL (2).  1. IOM ([place] of Medicine). 2010. Dietary reference     intakes for calcium and D. [place]: The     National Academies Press.  2. Chelsea MF, Kory STAUFFER, Edmundo JIMENEZ, et al.     Evaluation, treatment, and prevention of vitamin D     deficiency: an Endocrine Society clinical practice     guideline. JCEM. 2011 Jul; 96(7):1911-30.      PSA - [facility] 08/17/2020 0.5  0.0 - 4.0 ng/mL Final    Comment: Roche ECLIA methodology.  According to the American Urological Association, Serum PSA should  decrease and remain at undetectable levels after radical  prostatectomy. The AUA defines biochemical recurrence as an initial  PSA value 0.2 ng/mL or greater followed by a subsequent confirmatory  PSA value 0.2 ng/mL or greater.  Values obtained with different assay methods or kits cannot be used  interchangeably. Results cannot be interpreted as absolute evidence  of the presence or absence of malignant disease.      WBC 08/17/2020 7.3  3.4 - 10.8 x10E3/uL Final    RBC 08/17/2020 3.97* 4.14 - 5.80 x10E6/uL Final    Hemoglobin 08/17/2020 11.3* 13.0 - 17.7 g/dL Final    Hematocrit 08/17/2020 36.6* 37.5 - 51.0 % Final    MCV 08/17/2020 92  79 - 97 fL Final    MCH 08/17/2020 28.5  26.6 - 33.0 pg Final    MCHC 08/17/2020 30.9* 31.5 - 35.7 g/dL Final    RDW 08/17/2020 13.0  11.6 - 15.4 % Final    Platelets 08/17/2020 427  150 - 450 x10E3/uL Final    Neutrophils 08/17/2020 69  Not Estab. % Final    Lymphs 08/17/2020 20  Not Estab. % Final    Monocytes 08/17/2020 8  Not Estab. % Final    Eos 08/17/2020 2  Not Estab. % Final    Basos 08/17/2020 1  Not Estab. % Final    Neutrophils (Absolute) 08/17/2020 5.1  1.4 - 7.0 x10E3/uL Final    Lymphs (Absolute) 08/17/2020 1.4  0.7 - 3.1 x10E3/uL Final    Monocytes(Absolute) 08/17/2020 0.6  0.1 - 0.9 x10E3/uL Final    Eos (Absolute) 08/17/2020 0.2  0.0 - 0.4 x10E3/uL Final    Baso  (Absolute) 08/17/2020 0.0  0.0 - 0.2 x10E3/uL Final    Immature Granulocytes 08/17/2020 0  Not Estab. % Final    Immature Grans (Abs) 08/17/2020 0.0  0.0 - 0.1 x10E3/uL Final   ]  Assessment:       1. Essential hypertension    2. Colon cancer screening    3. Familial hypercholesterolemia    4. Normocytic anemia    5. Vitamin D deficiency    6. EVGENY on CPAP        Plan:       Hi was seen today for hypertension and hyperlipidemia.    Diagnoses and all orders for this visit:    Essential hypertension  Comments:  Well controlled    Colon cancer screening  Comments:  Discussed importance of screening, especially given mild anemia.  Orders:  -     Ambulatory referral/consult to Gastroenterology; Future    Familial hypercholesterolemia  Comments:  LDL 64    Normocytic anemia  Comments:  He started a vitamin supplement.  Recheck CBC  Orders:  -     Ambulatory referral/consult to Gastroenterology; Future  -     CBC Auto Differential; Future  -     CBC Auto Differential    Vitamin D deficiency  Comments:  Vit D normal    EVGENY on CPAP  Comments:  Conitnue CPAP

## 2020-12-22 DIAGNOSIS — E78.01 FAMILIAL HYPERCHOLESTEROLEMIA: ICD-10-CM

## 2020-12-22 DIAGNOSIS — I10 ESSENTIAL HYPERTENSION: ICD-10-CM

## 2020-12-22 DIAGNOSIS — M10.9 GOUT, UNSPECIFIED CAUSE, UNSPECIFIED CHRONICITY, UNSPECIFIED SITE: ICD-10-CM

## 2020-12-22 RX ORDER — VALSARTAN 320 MG/1
320 TABLET ORAL DAILY
Qty: 90 TABLET | Refills: 1 | Status: SHIPPED | OUTPATIENT
Start: 2020-12-22 | End: 2021-04-13 | Stop reason: SDUPTHER

## 2020-12-22 RX ORDER — METOPROLOL SUCCINATE 100 MG/1
100 TABLET, EXTENDED RELEASE ORAL DAILY
Qty: 90 TABLET | Refills: 1 | Status: SHIPPED | OUTPATIENT
Start: 2020-12-22 | End: 2021-04-13 | Stop reason: SDUPTHER

## 2020-12-22 RX ORDER — AMLODIPINE BESYLATE 10 MG/1
10 TABLET ORAL NIGHTLY
Qty: 90 TABLET | Refills: 1 | Status: SHIPPED | OUTPATIENT
Start: 2020-12-22 | End: 2021-04-13 | Stop reason: SDUPTHER

## 2020-12-22 RX ORDER — ALLOPURINOL 300 MG/1
300 TABLET ORAL DAILY
Qty: 90 TABLET | Refills: 1 | Status: SHIPPED | OUTPATIENT
Start: 2020-12-22 | End: 2021-04-13 | Stop reason: SDUPTHER

## 2020-12-22 RX ORDER — ATORVASTATIN CALCIUM 20 MG/1
20 TABLET, FILM COATED ORAL NIGHTLY
Qty: 90 TABLET | Refills: 1 | Status: SHIPPED | OUTPATIENT
Start: 2020-12-22 | End: 2021-04-13 | Stop reason: SDUPTHER

## 2021-02-17 DIAGNOSIS — E55.9 VITAMIN D DEFICIENCY: ICD-10-CM

## 2021-02-17 RX ORDER — ERGOCALCIFEROL 1.25 MG/1
50000 CAPSULE ORAL
Qty: 12 CAPSULE | Refills: 1 | Status: SHIPPED | OUTPATIENT
Start: 2021-02-17 | End: 2021-04-13 | Stop reason: SDUPTHER

## 2021-04-13 ENCOUNTER — OFFICE VISIT (OUTPATIENT)
Dept: FAMILY MEDICINE | Facility: CLINIC | Age: 51
End: 2021-04-13
Payer: COMMERCIAL

## 2021-04-13 VITALS
HEIGHT: 65 IN | TEMPERATURE: 98 F | WEIGHT: 303 LBS | HEART RATE: 75 BPM | SYSTOLIC BLOOD PRESSURE: 138 MMHG | BODY MASS INDEX: 50.48 KG/M2 | OXYGEN SATURATION: 97 % | DIASTOLIC BLOOD PRESSURE: 92 MMHG

## 2021-04-13 DIAGNOSIS — Z12.11 COLON CANCER SCREENING: ICD-10-CM

## 2021-04-13 DIAGNOSIS — I10 ESSENTIAL HYPERTENSION: Primary | ICD-10-CM

## 2021-04-13 DIAGNOSIS — E79.0 ELEVATED URIC ACID IN BLOOD: ICD-10-CM

## 2021-04-13 DIAGNOSIS — E55.9 VITAMIN D DEFICIENCY: ICD-10-CM

## 2021-04-13 DIAGNOSIS — E78.01 FAMILIAL HYPERCHOLESTEROLEMIA: ICD-10-CM

## 2021-04-13 PROCEDURE — 1126F PR PAIN SEVERITY QUANTIFIED, NO PAIN PRESENT: ICD-10-PCS | Mod: S$GLB,,, | Performed by: INTERNAL MEDICINE

## 2021-04-13 PROCEDURE — 1126F AMNT PAIN NOTED NONE PRSNT: CPT | Mod: S$GLB,,, | Performed by: INTERNAL MEDICINE

## 2021-04-13 PROCEDURE — 99214 PR OFFICE/OUTPT VISIT, EST, LEVL IV, 30-39 MIN: ICD-10-PCS | Mod: S$GLB,,, | Performed by: INTERNAL MEDICINE

## 2021-04-13 PROCEDURE — 3080F DIAST BP >= 90 MM HG: CPT | Mod: S$GLB,,, | Performed by: INTERNAL MEDICINE

## 2021-04-13 PROCEDURE — 3080F PR MOST RECENT DIASTOLIC BLOOD PRESSURE >= 90 MM HG: ICD-10-PCS | Mod: S$GLB,,, | Performed by: INTERNAL MEDICINE

## 2021-04-13 PROCEDURE — 3075F SYST BP GE 130 - 139MM HG: CPT | Mod: S$GLB,,, | Performed by: INTERNAL MEDICINE

## 2021-04-13 PROCEDURE — 3008F PR BODY MASS INDEX (BMI) DOCUMENTED: ICD-10-PCS | Mod: S$GLB,,, | Performed by: INTERNAL MEDICINE

## 2021-04-13 PROCEDURE — 3075F PR MOST RECENT SYSTOLIC BLOOD PRESS GE 130-139MM HG: ICD-10-PCS | Mod: S$GLB,,, | Performed by: INTERNAL MEDICINE

## 2021-04-13 PROCEDURE — 3008F BODY MASS INDEX DOCD: CPT | Mod: S$GLB,,, | Performed by: INTERNAL MEDICINE

## 2021-04-13 PROCEDURE — 99214 OFFICE O/P EST MOD 30 MIN: CPT | Mod: S$GLB,,, | Performed by: INTERNAL MEDICINE

## 2021-04-13 RX ORDER — AMLODIPINE BESYLATE 10 MG/1
10 TABLET ORAL NIGHTLY
Qty: 90 TABLET | Refills: 1 | Status: SHIPPED | OUTPATIENT
Start: 2021-04-13 | End: 2021-12-14 | Stop reason: DRUGHIGH

## 2021-04-13 RX ORDER — ERGOCALCIFEROL 1.25 MG/1
50000 CAPSULE ORAL
Qty: 12 CAPSULE | Refills: 1 | Status: SHIPPED | OUTPATIENT
Start: 2021-04-13 | End: 2021-10-20

## 2021-04-13 RX ORDER — METOPROLOL SUCCINATE 100 MG/1
100 TABLET, EXTENDED RELEASE ORAL DAILY
Qty: 90 TABLET | Refills: 1 | Status: SHIPPED | OUTPATIENT
Start: 2021-04-13 | End: 2022-01-14 | Stop reason: SDUPTHER

## 2021-04-13 RX ORDER — ASPIRIN 81 MG/1
81 TABLET ORAL DAILY
COMMUNITY

## 2021-04-13 RX ORDER — ALLOPURINOL 300 MG/1
300 TABLET ORAL DAILY
Qty: 90 TABLET | Refills: 1 | Status: SHIPPED | OUTPATIENT
Start: 2021-04-13 | End: 2022-01-31 | Stop reason: SDUPTHER

## 2021-04-13 RX ORDER — HYDROCHLOROTHIAZIDE 12.5 MG/1
12.5 TABLET ORAL DAILY
Qty: 90 TABLET | Refills: 1 | Status: SHIPPED | OUTPATIENT
Start: 2021-04-13 | End: 2021-10-20

## 2021-04-13 RX ORDER — ATORVASTATIN CALCIUM 20 MG/1
20 TABLET, FILM COATED ORAL NIGHTLY
Qty: 90 TABLET | Refills: 1 | Status: SHIPPED | OUTPATIENT
Start: 2021-04-13 | End: 2022-01-14 | Stop reason: SDUPTHER

## 2021-04-13 RX ORDER — VALSARTAN 320 MG/1
320 TABLET ORAL DAILY
Qty: 90 TABLET | Refills: 1 | Status: SHIPPED | OUTPATIENT
Start: 2021-04-13 | End: 2022-01-03

## 2021-06-15 ENCOUNTER — LAB VISIT (OUTPATIENT)
Dept: LAB | Facility: HOSPITAL | Age: 51
End: 2021-06-15
Attending: FAMILY MEDICINE
Payer: COMMERCIAL

## 2021-06-15 ENCOUNTER — OFFICE VISIT (OUTPATIENT)
Dept: FAMILY MEDICINE | Facility: CLINIC | Age: 51
End: 2021-06-15
Payer: COMMERCIAL

## 2021-06-15 VITALS
HEIGHT: 65 IN | OXYGEN SATURATION: 96 % | SYSTOLIC BLOOD PRESSURE: 122 MMHG | BODY MASS INDEX: 50.1 KG/M2 | TEMPERATURE: 99 F | HEART RATE: 86 BPM | DIASTOLIC BLOOD PRESSURE: 70 MMHG | WEIGHT: 300.69 LBS

## 2021-06-15 DIAGNOSIS — Z12.11 SCREEN FOR COLON CANCER: ICD-10-CM

## 2021-06-15 DIAGNOSIS — I10 ESSENTIAL HYPERTENSION: ICD-10-CM

## 2021-06-15 DIAGNOSIS — Z11.4 ENCOUNTER FOR SCREENING FOR HIV: ICD-10-CM

## 2021-06-15 DIAGNOSIS — Z13.6 ENCOUNTER FOR LIPID SCREENING FOR CARDIOVASCULAR DISEASE: ICD-10-CM

## 2021-06-15 DIAGNOSIS — K42.9 UMBILICAL HERNIA WITHOUT OBSTRUCTION AND WITHOUT GANGRENE: ICD-10-CM

## 2021-06-15 DIAGNOSIS — L81.9 DISCOLORATION OF SKIN OF LOWER LEG: ICD-10-CM

## 2021-06-15 DIAGNOSIS — Z76.89 ENCOUNTER TO ESTABLISH CARE WITH NEW DOCTOR: Primary | ICD-10-CM

## 2021-06-15 DIAGNOSIS — B35.1 ONYCHOMYCOSIS OF TOENAIL: ICD-10-CM

## 2021-06-15 DIAGNOSIS — Z13.220 ENCOUNTER FOR LIPID SCREENING FOR CARDIOVASCULAR DISEASE: ICD-10-CM

## 2021-06-15 DIAGNOSIS — Z12.5 SCREENING PSA (PROSTATE SPECIFIC ANTIGEN): ICD-10-CM

## 2021-06-15 DIAGNOSIS — Z11.59 NEED FOR HEPATITIS C SCREENING TEST: ICD-10-CM

## 2021-06-15 DIAGNOSIS — Z86.2 HISTORY OF ANEMIA: ICD-10-CM

## 2021-06-15 DIAGNOSIS — Z13.1 SCREENING FOR DIABETES MELLITUS: ICD-10-CM

## 2021-06-15 DIAGNOSIS — Z00.00 WELLNESS EXAMINATION: ICD-10-CM

## 2021-06-15 LAB
BASOPHILS # BLD AUTO: 0.03 K/UL (ref 0–0.2)
BASOPHILS NFR BLD: 0.5 % (ref 0–1.9)
COMPLEXED PSA SERPL-MCNC: 0.44 NG/ML (ref 0–4)
DIFFERENTIAL METHOD: ABNORMAL
EOSINOPHIL # BLD AUTO: 0.1 K/UL (ref 0–0.5)
EOSINOPHIL NFR BLD: 2.1 % (ref 0–8)
ERYTHROCYTE [DISTWIDTH] IN BLOOD BY AUTOMATED COUNT: 14.5 % (ref 11.5–14.5)
ESTIMATED AVG GLUCOSE: 103 MG/DL (ref 68–131)
HBA1C MFR BLD: 5.2 % (ref 4–5.6)
HCT VFR BLD AUTO: 35.6 % (ref 40–54)
HGB BLD-MCNC: 11.2 G/DL (ref 14–18)
IMM GRANULOCYTES # BLD AUTO: 0.02 K/UL (ref 0–0.04)
IMM GRANULOCYTES NFR BLD AUTO: 0.3 % (ref 0–0.5)
LYMPHOCYTES # BLD AUTO: 1.2 K/UL (ref 1–4.8)
LYMPHOCYTES NFR BLD: 17.9 % (ref 18–48)
MCH RBC QN AUTO: 28.5 PG (ref 27–31)
MCHC RBC AUTO-ENTMCNC: 31.5 G/DL (ref 32–36)
MCV RBC AUTO: 91 FL (ref 82–98)
MONOCYTES # BLD AUTO: 0.6 K/UL (ref 0.3–1)
MONOCYTES NFR BLD: 9.3 % (ref 4–15)
NEUTROPHILS # BLD AUTO: 4.6 K/UL (ref 1.8–7.7)
NEUTROPHILS NFR BLD: 69.9 % (ref 38–73)
NRBC BLD-RTO: 0 /100 WBC
PLATELET # BLD AUTO: 384 K/UL (ref 150–450)
PMV BLD AUTO: 9.4 FL (ref 9.2–12.9)
RBC # BLD AUTO: 3.93 M/UL (ref 4.6–6.2)
WBC # BLD AUTO: 6.58 K/UL (ref 3.9–12.7)

## 2021-06-15 PROCEDURE — 36415 COLL VENOUS BLD VENIPUNCTURE: CPT | Mod: PO | Performed by: FAMILY MEDICINE

## 2021-06-15 PROCEDURE — 83540 ASSAY OF IRON: CPT | Performed by: FAMILY MEDICINE

## 2021-06-15 PROCEDURE — 99999 PR PBB SHADOW E&M-NEW PATIENT-LVL IV: CPT | Mod: PBBFAC,,, | Performed by: FAMILY MEDICINE

## 2021-06-15 PROCEDURE — 99999 PR PBB SHADOW E&M-NEW PATIENT-LVL IV: ICD-10-PCS | Mod: PBBFAC,,, | Performed by: FAMILY MEDICINE

## 2021-06-15 PROCEDURE — 1126F PR PAIN SEVERITY QUANTIFIED, NO PAIN PRESENT: ICD-10-PCS | Mod: S$GLB,,, | Performed by: FAMILY MEDICINE

## 2021-06-15 PROCEDURE — 84153 ASSAY OF PSA TOTAL: CPT | Performed by: FAMILY MEDICINE

## 2021-06-15 PROCEDURE — 86803 HEPATITIS C AB TEST: CPT | Performed by: FAMILY MEDICINE

## 2021-06-15 PROCEDURE — 1126F AMNT PAIN NOTED NONE PRSNT: CPT | Mod: S$GLB,,, | Performed by: FAMILY MEDICINE

## 2021-06-15 PROCEDURE — 80053 COMPREHEN METABOLIC PANEL: CPT | Performed by: FAMILY MEDICINE

## 2021-06-15 PROCEDURE — 85025 COMPLETE CBC W/AUTO DIFF WBC: CPT | Performed by: FAMILY MEDICINE

## 2021-06-15 PROCEDURE — 3008F BODY MASS INDEX DOCD: CPT | Mod: CPTII,S$GLB,, | Performed by: FAMILY MEDICINE

## 2021-06-15 PROCEDURE — 3008F PR BODY MASS INDEX (BMI) DOCUMENTED: ICD-10-PCS | Mod: CPTII,S$GLB,, | Performed by: FAMILY MEDICINE

## 2021-06-15 PROCEDURE — 83036 HEMOGLOBIN GLYCOSYLATED A1C: CPT | Performed by: FAMILY MEDICINE

## 2021-06-15 PROCEDURE — 86703 HIV-1/HIV-2 1 RESULT ANTBDY: CPT | Performed by: FAMILY MEDICINE

## 2021-06-15 PROCEDURE — 82728 ASSAY OF FERRITIN: CPT | Performed by: FAMILY MEDICINE

## 2021-06-15 PROCEDURE — 99386 PR PREVENTIVE VISIT,NEW,40-64: ICD-10-PCS | Mod: S$GLB,,, | Performed by: FAMILY MEDICINE

## 2021-06-15 PROCEDURE — 99386 PREV VISIT NEW AGE 40-64: CPT | Mod: S$GLB,,, | Performed by: FAMILY MEDICINE

## 2021-06-15 PROCEDURE — 80061 LIPID PANEL: CPT | Performed by: FAMILY MEDICINE

## 2021-06-15 RX ORDER — CICLOPIROX 80 MG/ML
SOLUTION TOPICAL
Qty: 6.6 ML | Refills: 2 | Status: SHIPPED | OUTPATIENT
Start: 2021-06-15

## 2021-06-16 LAB
ALBUMIN SERPL BCP-MCNC: 3.8 G/DL (ref 3.5–5.2)
ALP SERPL-CCNC: 112 U/L (ref 55–135)
ALT SERPL W/O P-5'-P-CCNC: 17 U/L (ref 10–44)
ANION GAP SERPL CALC-SCNC: 14 MMOL/L (ref 8–16)
AST SERPL-CCNC: 20 U/L (ref 10–40)
BILIRUB SERPL-MCNC: 0.9 MG/DL (ref 0.1–1)
BUN SERPL-MCNC: 13 MG/DL (ref 6–20)
CALCIUM SERPL-MCNC: 9.2 MG/DL (ref 8.7–10.5)
CHLORIDE SERPL-SCNC: 104 MMOL/L (ref 95–110)
CHOLEST SERPL-MCNC: 128 MG/DL (ref 120–199)
CHOLEST/HDLC SERPL: 3.8 {RATIO} (ref 2–5)
CO2 SERPL-SCNC: 21 MMOL/L (ref 23–29)
CREAT SERPL-MCNC: 1 MG/DL (ref 0.5–1.4)
EST. GFR  (AFRICAN AMERICAN): >60 ML/MIN/1.73 M^2
EST. GFR  (NON AFRICAN AMERICAN): >60 ML/MIN/1.73 M^2
FERRITIN SERPL-MCNC: 153 NG/ML (ref 20–300)
GLUCOSE SERPL-MCNC: 76 MG/DL (ref 70–110)
HCV AB SERPL QL IA: NEGATIVE
HDLC SERPL-MCNC: 34 MG/DL (ref 40–75)
HDLC SERPL: 26.6 % (ref 20–50)
HIV 1+2 AB+HIV1 P24 AG SERPL QL IA: NEGATIVE
IRON SERPL-MCNC: 44 UG/DL (ref 45–160)
LDLC SERPL CALC-MCNC: 71.8 MG/DL (ref 63–159)
NONHDLC SERPL-MCNC: 94 MG/DL
POTASSIUM SERPL-SCNC: 3.5 MMOL/L (ref 3.5–5.1)
PROT SERPL-MCNC: 7.8 G/DL (ref 6–8.4)
SATURATED IRON: 11 % (ref 20–50)
SODIUM SERPL-SCNC: 139 MMOL/L (ref 136–145)
TOTAL IRON BINDING CAPACITY: 406 UG/DL (ref 250–450)
TRANSFERRIN SERPL-MCNC: 274 MG/DL (ref 200–375)
TRIGL SERPL-MCNC: 111 MG/DL (ref 30–150)

## 2021-06-18 ENCOUNTER — HOSPITAL ENCOUNTER (OUTPATIENT)
Dept: RADIOLOGY | Facility: HOSPITAL | Age: 51
Discharge: HOME | End: 2021-06-18
Attending: FAMILY MEDICINE
Payer: COMMERCIAL

## 2021-06-18 DIAGNOSIS — L81.9 DISCOLORATION OF SKIN OF LOWER LEG: ICD-10-CM

## 2021-06-18 DIAGNOSIS — K42.9 UMBILICAL HERNIA WITHOUT OBSTRUCTION AND WITHOUT GANGRENE: ICD-10-CM

## 2021-06-18 DIAGNOSIS — K42.9 UMBILICAL HERNIA WITHOUT OBSTRUCTION AND WITHOUT GANGRENE: Primary | ICD-10-CM

## 2021-06-18 PROCEDURE — 76705 ECHO EXAM OF ABDOMEN: CPT | Mod: 26,,, | Performed by: RADIOLOGY

## 2021-06-18 PROCEDURE — 93922 UPR/L XTREMITY ART 2 LEVELS: CPT | Mod: TC

## 2021-06-18 PROCEDURE — 93925 LOWER EXTREMITY STUDY: CPT | Mod: 26,,, | Performed by: RADIOLOGY

## 2021-06-18 PROCEDURE — 93970 US LOWER EXTREMITY VEINS BILATERAL INSUFFICIENCY: ICD-10-PCS | Mod: 26,,, | Performed by: RADIOLOGY

## 2021-06-18 PROCEDURE — 93970 EXTREMITY STUDY: CPT | Mod: TC

## 2021-06-18 PROCEDURE — 76705 US ABDOMEN LIMITED: ICD-10-PCS | Mod: 26,,, | Performed by: RADIOLOGY

## 2021-06-18 PROCEDURE — 76705 ECHO EXAM OF ABDOMEN: CPT | Mod: TC

## 2021-06-18 PROCEDURE — 93922 US ARTERIAL LOWER EXTREMITY BILAT WITH ABI (XPD): ICD-10-PCS | Mod: 26,,, | Performed by: RADIOLOGY

## 2021-06-18 PROCEDURE — 93970 EXTREMITY STUDY: CPT | Mod: 26,,, | Performed by: RADIOLOGY

## 2021-06-18 PROCEDURE — 93925 US ARTERIAL LOWER EXTREMITY BILAT WITH ABI (XPD): ICD-10-PCS | Mod: 26,,, | Performed by: RADIOLOGY

## 2021-06-18 PROCEDURE — 93922 UPR/L XTREMITY ART 2 LEVELS: CPT | Mod: 26,,, | Performed by: RADIOLOGY

## 2021-06-20 DIAGNOSIS — Z12.11 SCREEN FOR COLON CANCER: Primary | ICD-10-CM

## 2021-06-21 ENCOUNTER — TELEPHONE (OUTPATIENT)
Dept: FAMILY MEDICINE | Facility: CLINIC | Age: 51
End: 2021-06-21

## 2021-06-21 ENCOUNTER — TELEPHONE (OUTPATIENT)
Dept: GASTROENTEROLOGY | Facility: CLINIC | Age: 51
End: 2021-06-21

## 2021-06-21 DIAGNOSIS — Z12.11 SCREENING FOR COLON CANCER: Primary | ICD-10-CM

## 2021-06-22 ENCOUNTER — OFFICE VISIT (OUTPATIENT)
Dept: SURGERY | Facility: CLINIC | Age: 51
End: 2021-06-22
Payer: COMMERCIAL

## 2021-06-22 VITALS
DIASTOLIC BLOOD PRESSURE: 66 MMHG | WEIGHT: 301.13 LBS | HEART RATE: 75 BPM | BODY MASS INDEX: 50.11 KG/M2 | SYSTOLIC BLOOD PRESSURE: 136 MMHG

## 2021-06-22 DIAGNOSIS — K42.9 UMBILICAL HERNIA WITHOUT OBSTRUCTION AND WITHOUT GANGRENE: ICD-10-CM

## 2021-06-22 PROCEDURE — 99204 OFFICE O/P NEW MOD 45 MIN: CPT | Mod: S$GLB,,, | Performed by: SURGERY

## 2021-06-22 PROCEDURE — 3008F PR BODY MASS INDEX (BMI) DOCUMENTED: ICD-10-PCS | Mod: CPTII,S$GLB,, | Performed by: SURGERY

## 2021-06-22 PROCEDURE — 99999 PR PBB SHADOW E&M-EST. PATIENT-LVL III: ICD-10-PCS | Mod: PBBFAC,,, | Performed by: SURGERY

## 2021-06-22 PROCEDURE — 99999 PR PBB SHADOW E&M-EST. PATIENT-LVL III: CPT | Mod: PBBFAC,,, | Performed by: SURGERY

## 2021-06-22 PROCEDURE — 1126F PR PAIN SEVERITY QUANTIFIED, NO PAIN PRESENT: ICD-10-PCS | Mod: S$GLB,,, | Performed by: SURGERY

## 2021-06-22 PROCEDURE — 1126F AMNT PAIN NOTED NONE PRSNT: CPT | Mod: S$GLB,,, | Performed by: SURGERY

## 2021-06-22 PROCEDURE — 3008F BODY MASS INDEX DOCD: CPT | Mod: CPTII,S$GLB,, | Performed by: SURGERY

## 2021-06-22 PROCEDURE — 99204 PR OFFICE/OUTPT VISIT, NEW, LEVL IV, 45-59 MIN: ICD-10-PCS | Mod: S$GLB,,, | Performed by: SURGERY

## 2021-06-29 ENCOUNTER — TELEPHONE (OUTPATIENT)
Dept: FAMILY MEDICINE | Facility: CLINIC | Age: 51
End: 2021-06-29

## 2021-07-13 ENCOUNTER — PATIENT MESSAGE (OUTPATIENT)
Dept: GASTROENTEROLOGY | Facility: CLINIC | Age: 51
End: 2021-07-13

## 2021-07-23 ENCOUNTER — ANESTHESIA EVENT (OUTPATIENT)
Dept: ENDOSCOPY | Facility: HOSPITAL | Age: 51
End: 2021-07-23
Payer: COMMERCIAL

## 2021-07-23 ENCOUNTER — HOSPITAL ENCOUNTER (OUTPATIENT)
Facility: HOSPITAL | Age: 51
Discharge: HOME | End: 2021-07-23
Attending: INTERNAL MEDICINE | Admitting: INTERNAL MEDICINE
Payer: COMMERCIAL

## 2021-07-23 ENCOUNTER — ANESTHESIA (OUTPATIENT)
Dept: ENDOSCOPY | Facility: HOSPITAL | Age: 51
End: 2021-07-23
Payer: COMMERCIAL

## 2021-07-23 DIAGNOSIS — Z12.11 SCREENING FOR COLON CANCER: ICD-10-CM

## 2021-07-23 PROCEDURE — D9220A PRA ANESTHESIA: ICD-10-PCS | Mod: 33,,, | Performed by: ANESTHESIOLOGY

## 2021-07-23 PROCEDURE — 45380 COLONOSCOPY AND BIOPSY: CPT | Mod: 33,,, | Performed by: INTERNAL MEDICINE

## 2021-07-23 PROCEDURE — D9220A PRA ANESTHESIA: Mod: 33,,, | Performed by: NURSE ANESTHETIST, CERTIFIED REGISTERED

## 2021-07-23 PROCEDURE — 63600175 PHARM REV CODE 636 W HCPCS: Performed by: NURSE ANESTHETIST, CERTIFIED REGISTERED

## 2021-07-23 PROCEDURE — D9220A PRA ANESTHESIA: Mod: 33,,, | Performed by: ANESTHESIOLOGY

## 2021-07-23 PROCEDURE — 37000009 HC ANESTHESIA EA ADD 15 MINS: Performed by: INTERNAL MEDICINE

## 2021-07-23 PROCEDURE — D9220A PRA ANESTHESIA: ICD-10-PCS | Mod: 33,,, | Performed by: NURSE ANESTHETIST, CERTIFIED REGISTERED

## 2021-07-23 PROCEDURE — 88305 TISSUE EXAM BY PATHOLOGIST: CPT | Performed by: PATHOLOGY

## 2021-07-23 PROCEDURE — 88305 TISSUE EXAM BY PATHOLOGIST: CPT | Mod: 26,,, | Performed by: PATHOLOGY

## 2021-07-23 PROCEDURE — 45380 PR COLONOSCOPY,BIOPSY: ICD-10-PCS | Mod: 33,,, | Performed by: INTERNAL MEDICINE

## 2021-07-23 PROCEDURE — 25000003 PHARM REV CODE 250: Performed by: INTERNAL MEDICINE

## 2021-07-23 PROCEDURE — 27201012 HC FORCEPS, HOT/COLD, DISP: Performed by: INTERNAL MEDICINE

## 2021-07-23 PROCEDURE — 45380 COLONOSCOPY AND BIOPSY: CPT | Performed by: INTERNAL MEDICINE

## 2021-07-23 PROCEDURE — 88305 TISSUE EXAM BY PATHOLOGIST: ICD-10-PCS | Mod: 26,,, | Performed by: PATHOLOGY

## 2021-07-23 PROCEDURE — 37000008 HC ANESTHESIA 1ST 15 MINUTES: Performed by: INTERNAL MEDICINE

## 2021-07-23 RX ORDER — SODIUM CHLORIDE 9 MG/ML
INJECTION, SOLUTION INTRAVENOUS CONTINUOUS
Status: DISCONTINUED | OUTPATIENT
Start: 2021-07-23 | End: 2021-07-23 | Stop reason: HOSPADM

## 2021-07-23 RX ORDER — PROPOFOL 10 MG/ML
VIAL (ML) INTRAVENOUS
Status: DISCONTINUED | OUTPATIENT
Start: 2021-07-23 | End: 2021-07-23

## 2021-07-23 RX ADMIN — PROPOFOL 40 MG: 10 INJECTION, EMULSION INTRAVENOUS at 09:07

## 2021-07-23 RX ADMIN — PROPOFOL 120 MG: 10 INJECTION, EMULSION INTRAVENOUS at 09:07

## 2021-07-23 RX ADMIN — SODIUM CHLORIDE: 0.9 INJECTION, SOLUTION INTRAVENOUS at 08:07

## 2021-07-26 VITALS
SYSTOLIC BLOOD PRESSURE: 108 MMHG | TEMPERATURE: 99 F | WEIGHT: 300 LBS | BODY MASS INDEX: 49.98 KG/M2 | OXYGEN SATURATION: 95 % | HEART RATE: 79 BPM | RESPIRATION RATE: 15 BRPM | HEIGHT: 65 IN | DIASTOLIC BLOOD PRESSURE: 61 MMHG

## 2021-07-29 LAB
FINAL PATHOLOGIC DIAGNOSIS: NORMAL
GROSS: NORMAL
Lab: NORMAL

## 2021-10-15 ENCOUNTER — TELEPHONE (OUTPATIENT)
Dept: FAMILY MEDICINE | Facility: CLINIC | Age: 51
End: 2021-10-15

## 2021-10-27 ENCOUNTER — TELEPHONE (OUTPATIENT)
Dept: ADMINISTRATIVE | Facility: HOSPITAL | Age: 51
End: 2021-10-27
Payer: COMMERCIAL

## 2021-12-14 ENCOUNTER — LAB VISIT (OUTPATIENT)
Dept: LAB | Facility: HOSPITAL | Age: 51
End: 2021-12-14
Attending: STUDENT IN AN ORGANIZED HEALTH CARE EDUCATION/TRAINING PROGRAM
Payer: COMMERCIAL

## 2021-12-14 ENCOUNTER — OFFICE VISIT (OUTPATIENT)
Dept: FAMILY MEDICINE | Facility: CLINIC | Age: 51
End: 2021-12-14
Payer: COMMERCIAL

## 2021-12-14 VITALS
BODY MASS INDEX: 51.24 KG/M2 | RESPIRATION RATE: 18 BRPM | OXYGEN SATURATION: 98 % | HEIGHT: 65 IN | DIASTOLIC BLOOD PRESSURE: 88 MMHG | HEART RATE: 79 BPM | SYSTOLIC BLOOD PRESSURE: 134 MMHG | WEIGHT: 307.56 LBS

## 2021-12-14 DIAGNOSIS — I87.8 VENOUS STASIS: ICD-10-CM

## 2021-12-14 DIAGNOSIS — I10 BENIGN ESSENTIAL HTN: ICD-10-CM

## 2021-12-14 DIAGNOSIS — Z23 NEED FOR INFLUENZA VACCINATION: ICD-10-CM

## 2021-12-14 DIAGNOSIS — Z00.00 ROUTINE GENERAL MEDICAL EXAMINATION AT A HEALTH CARE FACILITY: Primary | ICD-10-CM

## 2021-12-14 DIAGNOSIS — G47.33 OSA ON CPAP: ICD-10-CM

## 2021-12-14 DIAGNOSIS — E66.01 CLASS 3 SEVERE OBESITY WITH SERIOUS COMORBIDITY AND BODY MASS INDEX (BMI) OF 50.0 TO 59.9 IN ADULT, UNSPECIFIED OBESITY TYPE: ICD-10-CM

## 2021-12-14 LAB
ANION GAP SERPL CALC-SCNC: 10 MMOL/L (ref 8–16)
BUN SERPL-MCNC: 12 MG/DL (ref 6–20)
CALCIUM SERPL-MCNC: 9.2 MG/DL (ref 8.7–10.5)
CHLORIDE SERPL-SCNC: 102 MMOL/L (ref 95–110)
CO2 SERPL-SCNC: 22 MMOL/L (ref 23–29)
CREAT SERPL-MCNC: 0.9 MG/DL (ref 0.5–1.4)
EST. GFR  (AFRICAN AMERICAN): >60 ML/MIN/1.73 M^2
EST. GFR  (NON AFRICAN AMERICAN): >60 ML/MIN/1.73 M^2
GLUCOSE SERPL-MCNC: 93 MG/DL (ref 70–110)
POTASSIUM SERPL-SCNC: 3.8 MMOL/L (ref 3.5–5.1)
SODIUM SERPL-SCNC: 134 MMOL/L (ref 136–145)

## 2021-12-14 PROCEDURE — 90471 FLU VACCINE (QUAD) GREATER THAN OR EQUAL TO 3YO PRESERVATIVE FREE IM: ICD-10-PCS | Mod: S$GLB,,, | Performed by: STUDENT IN AN ORGANIZED HEALTH CARE EDUCATION/TRAINING PROGRAM

## 2021-12-14 PROCEDURE — 36415 COLL VENOUS BLD VENIPUNCTURE: CPT | Mod: PO | Performed by: STUDENT IN AN ORGANIZED HEALTH CARE EDUCATION/TRAINING PROGRAM

## 2021-12-14 PROCEDURE — 80048 BASIC METABOLIC PNL TOTAL CA: CPT | Performed by: STUDENT IN AN ORGANIZED HEALTH CARE EDUCATION/TRAINING PROGRAM

## 2021-12-14 PROCEDURE — 3066F NEPHROPATHY DOC TX: CPT | Mod: CPTII,S$GLB,, | Performed by: STUDENT IN AN ORGANIZED HEALTH CARE EDUCATION/TRAINING PROGRAM

## 2021-12-14 PROCEDURE — 99214 OFFICE O/P EST MOD 30 MIN: CPT | Mod: 25,S$GLB,, | Performed by: STUDENT IN AN ORGANIZED HEALTH CARE EDUCATION/TRAINING PROGRAM

## 2021-12-14 PROCEDURE — 90686 FLU VACCINE (QUAD) GREATER THAN OR EQUAL TO 3YO PRESERVATIVE FREE IM: ICD-10-PCS | Mod: S$GLB,,, | Performed by: STUDENT IN AN ORGANIZED HEALTH CARE EDUCATION/TRAINING PROGRAM

## 2021-12-14 PROCEDURE — 3061F NEG MICROALBUMINURIA REV: CPT | Mod: CPTII,S$GLB,, | Performed by: STUDENT IN AN ORGANIZED HEALTH CARE EDUCATION/TRAINING PROGRAM

## 2021-12-14 PROCEDURE — 99999 PR PBB SHADOW E&M-EST. PATIENT-LVL V: CPT | Mod: PBBFAC,,, | Performed by: STUDENT IN AN ORGANIZED HEALTH CARE EDUCATION/TRAINING PROGRAM

## 2021-12-14 PROCEDURE — 99214 PR OFFICE/OUTPT VISIT, EST, LEVL IV, 30-39 MIN: ICD-10-PCS | Mod: 25,S$GLB,, | Performed by: STUDENT IN AN ORGANIZED HEALTH CARE EDUCATION/TRAINING PROGRAM

## 2021-12-14 PROCEDURE — 99999 PR PBB SHADOW E&M-EST. PATIENT-LVL V: ICD-10-PCS | Mod: PBBFAC,,, | Performed by: STUDENT IN AN ORGANIZED HEALTH CARE EDUCATION/TRAINING PROGRAM

## 2021-12-14 PROCEDURE — 4010F ACE/ARB THERAPY RXD/TAKEN: CPT | Mod: CPTII,S$GLB,, | Performed by: STUDENT IN AN ORGANIZED HEALTH CARE EDUCATION/TRAINING PROGRAM

## 2021-12-14 PROCEDURE — 3061F PR NEG MICROALBUMINURIA RESULT DOCUMENTED/REVIEW: ICD-10-PCS | Mod: CPTII,S$GLB,, | Performed by: STUDENT IN AN ORGANIZED HEALTH CARE EDUCATION/TRAINING PROGRAM

## 2021-12-14 PROCEDURE — 4010F PR ACE/ARB THEARPY RXD/TAKEN: ICD-10-PCS | Mod: CPTII,S$GLB,, | Performed by: STUDENT IN AN ORGANIZED HEALTH CARE EDUCATION/TRAINING PROGRAM

## 2021-12-14 PROCEDURE — 3066F PR DOCUMENTATION OF TREATMENT FOR NEPHROPATHY: ICD-10-PCS | Mod: CPTII,S$GLB,, | Performed by: STUDENT IN AN ORGANIZED HEALTH CARE EDUCATION/TRAINING PROGRAM

## 2021-12-14 PROCEDURE — 90686 IIV4 VACC NO PRSV 0.5 ML IM: CPT | Mod: S$GLB,,, | Performed by: STUDENT IN AN ORGANIZED HEALTH CARE EDUCATION/TRAINING PROGRAM

## 2021-12-14 PROCEDURE — 90471 IMMUNIZATION ADMIN: CPT | Mod: S$GLB,,, | Performed by: STUDENT IN AN ORGANIZED HEALTH CARE EDUCATION/TRAINING PROGRAM

## 2021-12-14 RX ORDER — HYDRALAZINE HYDROCHLORIDE 50 MG/1
50 TABLET, FILM COATED ORAL EVERY 12 HOURS
Qty: 60 TABLET | Refills: 2 | Status: SHIPPED | OUTPATIENT
Start: 2021-12-14 | End: 2022-01-25

## 2022-01-03 ENCOUNTER — TELEPHONE (OUTPATIENT)
Dept: FAMILY MEDICINE | Facility: CLINIC | Age: 52
End: 2022-01-03
Payer: COMMERCIAL

## 2022-01-03 NOTE — TELEPHONE ENCOUNTER
Pt placed on hydrALAZINE (APRESOLINE) at visit on 12/14/2021, states he started having headaches after taking med. Requesting refill on Metoprolol and Valsartan (this med has been filled.) Please advise.

## 2022-01-03 NOTE — TELEPHONE ENCOUNTER
----- Message from Suzanne High sent at 1/3/2022 12:19 PM CST -----  Regarding: advice  Contact: self  Type: Needs Medical Advice  Who Called: self  Symptoms (please be specific):   How long has patient had these symptoms:    Pharmacy name and phone #:  [facility]  Best Call Back Number: [phone]  Additional Information: Patient complain of a headache after taking rx hydrALAZINE (APRESOLINE) 50 MG tablet. Patient requesting rx refill for Rx  Metoprolol , Rx valsartan.

## 2022-01-04 NOTE — TELEPHONE ENCOUNTER
Please see message below, thank you !      ----- Message from Yoana Traylor sent at 1/4/2022 12:47 PM CST -----  Contact: patient  Type: Needs Medical Advice  Who Called:  patient  Pharmacy name and phone #:    [facility] - [place], [place] - [address] [place] [address]  Phone: [phone] Fax: [phone]  Best Call Back Number: [phone]  Additional Information: patient states he was supposed to be prescribed a diet pill last time he was seen, but hasn't received it. Please advise

## 2022-01-07 ENCOUNTER — TELEPHONE (OUTPATIENT)
Dept: FAMILY MEDICINE | Facility: CLINIC | Age: 52
End: 2022-01-07
Payer: COMMERCIAL

## 2022-01-07 NOTE — TELEPHONE ENCOUNTER
----- Message from Tadeo Blackburn sent at 1/7/2022  3:00 PM CST -----  Contact: pt  Type:  Sooner Apoointment Request    Caller is requesting a sooner appointment.  Caller declined first available appointment listed below.  Caller will not accept being placed on the waitlist and is requesting a message be sent to doctor.    Name of Caller:  pt  When is the first available appointment?  01/14 1pm  Symptoms:  pt has to be at work at 1pm.  Best Call Back Number:  [phone]    Additional Information:  he normally has am appointments scheduled.  Is there any way to get him in for the 1 month bp check the morning of Friday 1/14?    Please call the patient.

## 2022-01-14 ENCOUNTER — CLINICAL SUPPORT (OUTPATIENT)
Dept: FAMILY MEDICINE | Facility: CLINIC | Age: 52
End: 2022-01-14
Payer: COMMERCIAL

## 2022-01-14 VITALS — OXYGEN SATURATION: 97 % | DIASTOLIC BLOOD PRESSURE: 70 MMHG | SYSTOLIC BLOOD PRESSURE: 124 MMHG | HEART RATE: 87 BPM

## 2022-01-14 DIAGNOSIS — I10 ESSENTIAL HYPERTENSION: ICD-10-CM

## 2022-01-14 DIAGNOSIS — E78.01 FAMILIAL HYPERCHOLESTEROLEMIA: ICD-10-CM

## 2022-01-14 PROCEDURE — 99999 PR PBB SHADOW E&M-EST. PATIENT-LVL II: CPT | Mod: PBBFAC,,,

## 2022-01-14 PROCEDURE — 99999 PR PBB SHADOW E&M-EST. PATIENT-LVL II: ICD-10-PCS | Mod: PBBFAC,,,

## 2022-01-14 RX ORDER — METOPROLOL SUCCINATE 100 MG/1
100 TABLET, EXTENDED RELEASE ORAL DAILY
Qty: 90 TABLET | Refills: 1 | Status: SHIPPED | OUTPATIENT
Start: 2022-01-14 | End: 2022-08-08

## 2022-01-14 RX ORDER — ATORVASTATIN CALCIUM 20 MG/1
20 TABLET, FILM COATED ORAL NIGHTLY
Qty: 90 TABLET | Refills: 1 | Status: SHIPPED | OUTPATIENT
Start: 2022-01-14 | End: 2022-06-14

## 2022-01-14 NOTE — TELEPHONE ENCOUNTER
No new care gaps identified.  Powered by [employer] by [employer]. Reference number: [id number].   1/14/2022 9:46:59 AM CST

## 2022-01-14 NOTE — PROGRESS NOTES
Pt was seen for BP check, pt requested metoprolol and atorvastatin to be sent to pharmacy. Blood pressure was 124/70. Pt stated he has been doing diet changes.

## 2022-01-25 ENCOUNTER — OFFICE VISIT (OUTPATIENT)
Dept: FAMILY MEDICINE | Facility: CLINIC | Age: 52
End: 2022-01-25
Payer: COMMERCIAL

## 2022-01-25 VITALS
BODY MASS INDEX: 52.04 KG/M2 | DIASTOLIC BLOOD PRESSURE: 82 MMHG | HEART RATE: 81 BPM | WEIGHT: 312.38 LBS | HEIGHT: 65 IN | RESPIRATION RATE: 18 BRPM | SYSTOLIC BLOOD PRESSURE: 136 MMHG | OXYGEN SATURATION: 98 %

## 2022-01-25 DIAGNOSIS — E66.01 CLASS 3 SEVERE OBESITY WITH SERIOUS COMORBIDITY AND BODY MASS INDEX (BMI) OF 50.0 TO 59.9 IN ADULT, UNSPECIFIED OBESITY TYPE: Primary | ICD-10-CM

## 2022-01-25 PROCEDURE — 1159F MED LIST DOCD IN RCRD: CPT | Mod: CPTII,S$GLB,, | Performed by: STUDENT IN AN ORGANIZED HEALTH CARE EDUCATION/TRAINING PROGRAM

## 2022-01-25 PROCEDURE — 4010F PR ACE/ARB THEARPY RXD/TAKEN: ICD-10-PCS | Mod: CPTII,S$GLB,, | Performed by: STUDENT IN AN ORGANIZED HEALTH CARE EDUCATION/TRAINING PROGRAM

## 2022-01-25 PROCEDURE — 3075F PR MOST RECENT SYSTOLIC BLOOD PRESS GE 130-139MM HG: ICD-10-PCS | Mod: CPTII,S$GLB,, | Performed by: STUDENT IN AN ORGANIZED HEALTH CARE EDUCATION/TRAINING PROGRAM

## 2022-01-25 PROCEDURE — 3079F DIAST BP 80-89 MM HG: CPT | Mod: CPTII,S$GLB,, | Performed by: STUDENT IN AN ORGANIZED HEALTH CARE EDUCATION/TRAINING PROGRAM

## 2022-01-25 PROCEDURE — 3075F SYST BP GE 130 - 139MM HG: CPT | Mod: CPTII,S$GLB,, | Performed by: STUDENT IN AN ORGANIZED HEALTH CARE EDUCATION/TRAINING PROGRAM

## 2022-01-25 PROCEDURE — 1159F PR MEDICATION LIST DOCUMENTED IN MEDICAL RECORD: ICD-10-PCS | Mod: CPTII,S$GLB,, | Performed by: STUDENT IN AN ORGANIZED HEALTH CARE EDUCATION/TRAINING PROGRAM

## 2022-01-25 PROCEDURE — 1160F PR REVIEW ALL MEDS BY PRESCRIBER/CLIN PHARMACIST DOCUMENTED: ICD-10-PCS | Mod: CPTII,S$GLB,, | Performed by: STUDENT IN AN ORGANIZED HEALTH CARE EDUCATION/TRAINING PROGRAM

## 2022-01-25 PROCEDURE — 3008F PR BODY MASS INDEX (BMI) DOCUMENTED: ICD-10-PCS | Mod: CPTII,S$GLB,, | Performed by: STUDENT IN AN ORGANIZED HEALTH CARE EDUCATION/TRAINING PROGRAM

## 2022-01-25 PROCEDURE — 3008F BODY MASS INDEX DOCD: CPT | Mod: CPTII,S$GLB,, | Performed by: STUDENT IN AN ORGANIZED HEALTH CARE EDUCATION/TRAINING PROGRAM

## 2022-01-25 PROCEDURE — 99213 OFFICE O/P EST LOW 20 MIN: CPT | Mod: S$GLB,,, | Performed by: STUDENT IN AN ORGANIZED HEALTH CARE EDUCATION/TRAINING PROGRAM

## 2022-01-25 PROCEDURE — 99999 PR PBB SHADOW E&M-EST. PATIENT-LVL V: ICD-10-PCS | Mod: PBBFAC,,, | Performed by: STUDENT IN AN ORGANIZED HEALTH CARE EDUCATION/TRAINING PROGRAM

## 2022-01-25 PROCEDURE — 99999 PR PBB SHADOW E&M-EST. PATIENT-LVL V: CPT | Mod: PBBFAC,,, | Performed by: STUDENT IN AN ORGANIZED HEALTH CARE EDUCATION/TRAINING PROGRAM

## 2022-01-25 PROCEDURE — 3079F PR MOST RECENT DIASTOLIC BLOOD PRESSURE 80-89 MM HG: ICD-10-PCS | Mod: CPTII,S$GLB,, | Performed by: STUDENT IN AN ORGANIZED HEALTH CARE EDUCATION/TRAINING PROGRAM

## 2022-01-25 PROCEDURE — 1160F RVW MEDS BY RX/DR IN RCRD: CPT | Mod: CPTII,S$GLB,, | Performed by: STUDENT IN AN ORGANIZED HEALTH CARE EDUCATION/TRAINING PROGRAM

## 2022-01-25 PROCEDURE — 4010F ACE/ARB THERAPY RXD/TAKEN: CPT | Mod: CPTII,S$GLB,, | Performed by: STUDENT IN AN ORGANIZED HEALTH CARE EDUCATION/TRAINING PROGRAM

## 2022-01-25 PROCEDURE — 99213 PR OFFICE/OUTPT VISIT, EST, LEVL III, 20-29 MIN: ICD-10-PCS | Mod: S$GLB,,, | Performed by: STUDENT IN AN ORGANIZED HEALTH CARE EDUCATION/TRAINING PROGRAM

## 2022-01-25 NOTE — PROGRESS NOTES
[facility] MEDICINE CLINIC NOTE    Patient Name: Hi Andres  YOB: 1970    PRESENTING HISTORY   Chief Complaint:   Chief Complaint   Patient presents with    Weight Loss        History of Present Illness:  Mr. Hi Andres is a 51 y.o. male here to discuss weight and options for weight loss.     Doing meal prep. Tends to go prolonged fasting periods naturally.   Exercising    Previous interventions- caffeine type pills. Diet liquid drinks.     When discussing medication options:  Wants to try without surgery   Injections are out  No prior seizures  No hx kidney stones.   Blood pressures are reasonable.                                            Review of Systems   All other systems reviewed and are negative.        PAST HISTORY:     Past Medical History:   Diagnosis Date    Anemia     Arthritis     Broken foot     Left side (fell down a ladder)    Gout     Hernia, abdominal     Hyperlipidemia     Hypertension     EVGENY on CPAP     Vitamin D deficiency        Past Surgical History:   Procedure Laterality Date    COLONOSCOPY N/A 7/23/2021    Procedure: COLONOSCOPY;  Surgeon: Emily Valente MD;  [facility];  Service: Endoscopy;  Laterality: N/A;    HERNIA REPAIR         Family History   Problem Relation Age of Onset    Arthritis Father     Hypertension Father     Diabetes Father     No Known Problems Mother     Prostate cancer Neg Hx     Colon cancer Neg Hx        Social History     Socioeconomic History    Marital status:    Tobacco Use    Smoking status: Never Smoker    Smokeless tobacco: Never Used   Substance and Sexual Activity    Alcohol use: Yes     Comment: monthly    Drug use: No    Sexual activity: Yes     Partners: Female   Social History Narrative    Live with wife       MEDICATIONS & ALLERGIES:     Current Outpatient Medications on File Prior to Visit   Medication Sig    allopurinoL (ZYLOPRIM) 300 MG tablet Take 1 tablet (300 mg  "total) by mouth once daily.    ascorbic acid, vitamin C, (VITAMIN C) 1000 MG tablet Take 1,000 mg by mouth once daily.    aspirin (ECOTRIN) 81 MG EC tablet Take 81 mg by mouth once daily.    atorvastatin (LIPITOR) 20 MG tablet Take 1 tablet (20 mg total) by mouth nightly.    ciclopirox (PENLAC) 8 % Soln ?Apply nail lacquer 8% solution once daily to affected nails with applicator brush, preferably at bedtime or 8 hours before washing; remove with alcohol every 7 days    ergocalciferol (ERGOCALCIFEROL) 50,000 unit Cap TAKE 1 CAPSULE EVERY 7 DAYS    hydroCHLOROthiazide (HYDRODIURIL) 12.5 MG Tab TAKE 1 TABLET DAILY    metoprolol succinate (TOPROL-XL) 100 MG 24 hr tablet Take 1 tablet (100 mg total) by mouth once daily.    multivitamin (MULTIPLE VITAMINS ORAL) Take 1 tablet by mouth once daily.    omega-3 fatty acids-fish oil 435-880 mg Cap Take 1 capsule by mouth once daily.    tadalafiL (CIALIS) 5 MG tablet Take 1 tablet by mouth once daily    valsartan (DIOVAN) 320 MG tablet TAKE 1 TABLET DAILY WITH FOOD    [DISCONTINUED] hydrALAZINE (APRESOLINE) 50 MG tablet Take 1 tablet (50 mg total) by mouth every 12 (twelve) hours.     No current facility-administered medications on file prior to visit.       Review of patient's allergies indicates:  No Known Allergies    OBJECTIVE:   Vital Signs:  Vitals:    01/25/22 1109   BP: 136/82   Pulse: 81   Resp: 18   SpO2: 98%   Weight: (!) 141.7 kg (312 lb 6.3 oz)   Height: 5' 5" (1.651 m)       No results found for this or any previous visit (from the past 24 hour(s)).      Physical Exam  Vitals and nursing note reviewed.   Constitutional:       Appearance: He is obese.         ASSESSMENT & PLAN:     Class 3 severe obesity with serious comorbidity and body mass index (BMI) of 50.0 to 59.9 in adult, unspecified obesity type  -     orlistat (ROMAN) 60 MG capsule; Take 1 capsule (60 mg total) by mouth 3 (three) times daily with meals.  Dispense: 90 capsule; Refill: " 11    Discussed options.   He will try intermittent fasting.   Fat binder. Discussed risk profile and this was the one he was most comfortable with.   Reassess in a few months.       Johan Chamberlain MD   Internal Medicine    This note was created using Dragon voice recognition software that occasionally misinterprets phrases or words.

## 2022-01-25 NOTE — PATIENT INSTRUCTIONS
Ozempic- This is a diabetes medicine which has been shown to help people lose weight regardless of whether you have diabetes. If you have ever had pancreatitis or a history of cancers in the family we should discuss in more details, but this medicine is generally very safe.     Contrave- This is a medication which has an antidepressant mixed with an anti-opiate medicine. It has been shown to provide modest weight loss benefits. If you have ever had seizures or are on any opiate pain medicines it is not recommended but otherwise there are no significant side effects.     Qsymia- This is another combination medicine which has a mild stimulant mixed with an old anti-seizure medicine. I usually don't recommend this one for people who have high blood pressure.     Orlistat- This medicine works by preventing the breakdown of fats in your gut. It can cause some diarrhea, abdominal cramping and bloating but is fairly safe and effective.     There are a few more creative options we can try, but these are a good start. Let me know if you are interested in any of these.        Take orlistat before your meals    Walk 5 days/week    Fasting    If you are due for any health screening(s) below please notify me so we can arrange them to be ordered and scheduled to maintain your health.     Health Maintenance   Topic Date Due    Lipid Panel  06/15/2026    TETANUS VACCINE  08/31/2027    Hepatitis C Screening  Completed

## 2022-03-31 DIAGNOSIS — I10 ESSENTIAL HYPERTENSION: ICD-10-CM

## 2022-03-31 RX ORDER — HYDROCHLOROTHIAZIDE 12.5 MG/1
12.5 TABLET ORAL DAILY
Qty: 90 TABLET | Refills: 3 | Status: SHIPPED | OUTPATIENT
Start: 2022-03-31 | End: 2023-03-29

## 2022-03-31 NOTE — TELEPHONE ENCOUNTER
Care Due:                  Date            Visit Type   Department     Provider  --------------------------------------------------------------------------------                                EP -                              PRIMARY      SALVATORE Myers  Last Visit: 01-      CARE ([facility])   MEDICINE       Naccari                              EP -                              PRIMARY      [facility] FAMILY Johan Myers  Next Visit: 06-      CARE ([facility])   MEDICINE       Naccari                                                            Last  Test          Frequency    Reason                     Performed    Due Date  --------------------------------------------------------------------------------    CBC.........  12 months..  allopurinoL..............  06-   06-    CMP.........  12 months..  allopurinoL, atorvastatin  06-   06-    Lipid Panel.  12 months..  atorvastatin.............  06-   06-    Uric Acid...  12 months..  allopurinoL..............  Not Found    Overdue    Powered by [employer] by [employer]. Reference number: [id number].   3/31/2022 11:25:52 AM CDT

## 2022-06-07 DIAGNOSIS — E55.9 VITAMIN D DEFICIENCY: ICD-10-CM

## 2022-06-07 RX ORDER — ERGOCALCIFEROL 1.25 MG/1
CAPSULE ORAL
Qty: 12 CAPSULE | Refills: 1 | Status: SHIPPED | OUTPATIENT
Start: 2022-06-07 | End: 2022-11-21

## 2022-06-14 ENCOUNTER — HOSPITAL ENCOUNTER (OUTPATIENT)
Dept: RADIOLOGY | Facility: CLINIC | Age: 52
Discharge: HOME | End: 2022-06-14
Attending: STUDENT IN AN ORGANIZED HEALTH CARE EDUCATION/TRAINING PROGRAM
Payer: COMMERCIAL

## 2022-06-14 ENCOUNTER — OFFICE VISIT (OUTPATIENT)
Dept: FAMILY MEDICINE | Facility: CLINIC | Age: 52
End: 2022-06-14
Payer: COMMERCIAL

## 2022-06-14 VITALS
HEIGHT: 65 IN | RESPIRATION RATE: 18 BRPM | OXYGEN SATURATION: 96 % | DIASTOLIC BLOOD PRESSURE: 78 MMHG | WEIGHT: 295.44 LBS | BODY MASS INDEX: 49.22 KG/M2 | HEART RATE: 82 BPM | SYSTOLIC BLOOD PRESSURE: 132 MMHG

## 2022-06-14 DIAGNOSIS — M75.81 TENDINITIS OF RIGHT ROTATOR CUFF: ICD-10-CM

## 2022-06-14 DIAGNOSIS — Z00.00 ROUTINE GENERAL MEDICAL EXAMINATION AT A HEALTH CARE FACILITY: Primary | ICD-10-CM

## 2022-06-14 DIAGNOSIS — I10 PRIMARY HYPERTENSION: ICD-10-CM

## 2022-06-14 DIAGNOSIS — M10.9 GOUT, UNSPECIFIED CAUSE, UNSPECIFIED CHRONICITY, UNSPECIFIED SITE: ICD-10-CM

## 2022-06-14 PROBLEM — E66.01 CLASS 3 SEVERE OBESITY WITH SERIOUS COMORBIDITY AND BODY MASS INDEX (BMI) OF 50.0 TO 59.9 IN ADULT: Status: ACTIVE | Noted: 2018-05-10

## 2022-06-14 PROBLEM — E66.813 CLASS 3 SEVERE OBESITY WITH SERIOUS COMORBIDITY AND BODY MASS INDEX (BMI) OF 50.0 TO 59.9 IN ADULT: Status: ACTIVE | Noted: 2018-05-10

## 2022-06-14 PROCEDURE — 3075F SYST BP GE 130 - 139MM HG: CPT | Mod: CPTII,S$GLB,, | Performed by: STUDENT IN AN ORGANIZED HEALTH CARE EDUCATION/TRAINING PROGRAM

## 2022-06-14 PROCEDURE — 3075F PR MOST RECENT SYSTOLIC BLOOD PRESS GE 130-139MM HG: ICD-10-PCS | Mod: CPTII,S$GLB,, | Performed by: STUDENT IN AN ORGANIZED HEALTH CARE EDUCATION/TRAINING PROGRAM

## 2022-06-14 PROCEDURE — 3008F PR BODY MASS INDEX (BMI) DOCUMENTED: ICD-10-PCS | Mod: CPTII,S$GLB,, | Performed by: STUDENT IN AN ORGANIZED HEALTH CARE EDUCATION/TRAINING PROGRAM

## 2022-06-14 PROCEDURE — 4010F PR ACE/ARB THEARPY RXD/TAKEN: ICD-10-PCS | Mod: CPTII,S$GLB,, | Performed by: STUDENT IN AN ORGANIZED HEALTH CARE EDUCATION/TRAINING PROGRAM

## 2022-06-14 PROCEDURE — 99999 PR PBB SHADOW E&M-EST. PATIENT-LVL V: ICD-10-PCS | Mod: PBBFAC,,, | Performed by: STUDENT IN AN ORGANIZED HEALTH CARE EDUCATION/TRAINING PROGRAM

## 2022-06-14 PROCEDURE — 99396 PR PREVENTIVE VISIT,EST,40-64: ICD-10-PCS | Mod: S$GLB,,, | Performed by: STUDENT IN AN ORGANIZED HEALTH CARE EDUCATION/TRAINING PROGRAM

## 2022-06-14 PROCEDURE — 73030 XR SHOULDER COMPLETE 2 OR MORE VIEWS RIGHT: ICD-10-PCS | Mod: 26,RT,S$GLB, | Performed by: RADIOLOGY

## 2022-06-14 PROCEDURE — 3078F PR MOST RECENT DIASTOLIC BLOOD PRESSURE < 80 MM HG: ICD-10-PCS | Mod: CPTII,S$GLB,, | Performed by: STUDENT IN AN ORGANIZED HEALTH CARE EDUCATION/TRAINING PROGRAM

## 2022-06-14 PROCEDURE — 99999 PR PBB SHADOW E&M-EST. PATIENT-LVL V: CPT | Mod: PBBFAC,,, | Performed by: STUDENT IN AN ORGANIZED HEALTH CARE EDUCATION/TRAINING PROGRAM

## 2022-06-14 PROCEDURE — 73030 X-RAY EXAM OF SHOULDER: CPT | Mod: TC,FY,PO,RT

## 2022-06-14 PROCEDURE — 1159F PR MEDICATION LIST DOCUMENTED IN MEDICAL RECORD: ICD-10-PCS | Mod: CPTII,S$GLB,, | Performed by: STUDENT IN AN ORGANIZED HEALTH CARE EDUCATION/TRAINING PROGRAM

## 2022-06-14 PROCEDURE — 1159F MED LIST DOCD IN RCRD: CPT | Mod: CPTII,S$GLB,, | Performed by: STUDENT IN AN ORGANIZED HEALTH CARE EDUCATION/TRAINING PROGRAM

## 2022-06-14 PROCEDURE — 3008F BODY MASS INDEX DOCD: CPT | Mod: CPTII,S$GLB,, | Performed by: STUDENT IN AN ORGANIZED HEALTH CARE EDUCATION/TRAINING PROGRAM

## 2022-06-14 PROCEDURE — 3078F DIAST BP <80 MM HG: CPT | Mod: CPTII,S$GLB,, | Performed by: STUDENT IN AN ORGANIZED HEALTH CARE EDUCATION/TRAINING PROGRAM

## 2022-06-14 PROCEDURE — 99396 PREV VISIT EST AGE 40-64: CPT | Mod: S$GLB,,, | Performed by: STUDENT IN AN ORGANIZED HEALTH CARE EDUCATION/TRAINING PROGRAM

## 2022-06-14 PROCEDURE — 4010F ACE/ARB THERAPY RXD/TAKEN: CPT | Mod: CPTII,S$GLB,, | Performed by: STUDENT IN AN ORGANIZED HEALTH CARE EDUCATION/TRAINING PROGRAM

## 2022-06-14 PROCEDURE — 73030 X-RAY EXAM OF SHOULDER: CPT | Mod: 26,RT,S$GLB, | Performed by: RADIOLOGY

## 2022-06-14 RX ORDER — AMLODIPINE BESYLATE 10 MG/1
10 TABLET ORAL DAILY
COMMUNITY

## 2022-06-14 NOTE — PROGRESS NOTES
[facility] MEDICINE CLINIC NOTE    Patient Name: Hi Andres  YOB: 1970    PRESENTING HISTORY   Chief Complaint: No chief complaint on file.       History of Present Illness:  Mr. Hi Andres is a 51 y.o. male here for scheduled f/u.     His wife has been on him about lifestyle changes and he is doing well. Walking regularly, eating better. Has lost 20 lbs.     Gout is well controlled.     HTN is well controlled.     Right shoulder- several months of pain with ROM. Not improving. Takes NSAIDs occasionally.   No injury that he recalls.                                          Review of Systems   All other systems reviewed and are negative.        PAST HISTORY:     Past Medical History:   Diagnosis Date    Anemia     Arthritis     Broken foot     Left side (fell down a ladder)    Gout     Hernia, abdominal     Hyperlipidemia     Hypertension     EVGENY on CPAP     Vitamin D deficiency        Past Surgical History:   Procedure Laterality Date    COLONOSCOPY N/A 7/23/2021    Procedure: COLONOSCOPY;  Surgeon: Emily Valente MD;  Location: [facility];  Service: Endoscopy;  Laterality: N/A;    HERNIA REPAIR         Family History   Problem Relation Age of Onset    Arthritis Father     Hypertension Father     Diabetes Father     No Known Problems Mother     Prostate cancer Neg Hx     Colon cancer Neg Hx        Social History     Socioeconomic History    Marital status:    Tobacco Use    Smoking status: Never Smoker    Smokeless tobacco: Never Used   Substance and Sexual Activity    Alcohol use: Yes     Comment: monthly    Drug use: No    Sexual activity: Yes     Partners: Female   Social History Narrative    Live with wife       MEDICATIONS & ALLERGIES:     Current Outpatient Medications on File Prior to Visit   Medication Sig    allopurinoL (ZYLOPRIM) 300 MG tablet Take 1 tablet (300 mg total) by mouth once daily.    amLODIPine (NORVASC) 10 MG tablet      "ascorbic acid, vitamin C, (VITAMIN C) 1000 MG tablet Take 1,000 mg by mouth once daily.    aspirin (ECOTRIN) 81 MG EC tablet Take 81 mg by mouth once daily.    ergocalciferol (ERGOCALCIFEROL) 50,000 unit Cap TAKE 1 CAPSULE EVERY 7 DAYS    hydroCHLOROthiazide (HYDRODIURIL) 12.5 MG Tab Take 1 tablet (12.5 mg total) by mouth once daily.    metoprolol succinate (TOPROL-XL) 100 MG 24 hr tablet Take 1 tablet (100 mg total) by mouth once daily.    multivitamin (MULTIPLE VITAMINS ORAL) Take 1 tablet by mouth once daily.    omega-3 fatty acids-fish oil 435-880 mg Cap Take 1 capsule by mouth once daily.    orlistat (ROMAN) 60 MG capsule Take 1 capsule (60 mg total) by mouth 3 (three) times daily with meals.    tadalafiL (CIALIS) 5 MG tablet Take 1 tablet by mouth once daily    valsartan (DIOVAN) 320 MG tablet TAKE 1 TABLET DAILY WITH FOOD    atorvastatin (LIPITOR) 20 MG tablet Take 1 tablet (20 mg total) by mouth nightly. (Patient not taking: Reported on 6/14/2022)    ciclopirox (PENLAC) 8 % Soln ?Apply nail lacquer 8% solution once daily to affected nails with applicator brush, preferably at bedtime or 8 hours before washing; remove with alcohol every 7 days     No current facility-administered medications on file prior to visit.       Review of patient's allergies indicates:  No Known Allergies    OBJECTIVE:   Vital Signs:  Vitals:    06/14/22 0931   BP: 132/78   Pulse: 82   Resp: 18   SpO2: 96%   Weight: 134 kg (295 lb 6.7 oz)   Height: 5' 5" (1.651 m)       No results found for this or any previous visit (from the past 24 hour(s)).      Physical Exam  Vitals and nursing note reviewed.   Constitutional:       Appearance: He is not diaphoretic.   HENT:      Head: Normocephalic and atraumatic.      Right Ear: External ear normal.      Left Ear: External ear normal.   Eyes:      General: No scleral icterus.     Conjunctiva/sclera: Conjunctivae normal.      Pupils: Pupils are equal, round, and reactive to light. "   Neck:      Thyroid: No thyromegaly.   Cardiovascular:      Rate and Rhythm: Normal rate and regular rhythm.      Heart sounds: Normal heart sounds. No murmur heard.  Pulmonary:      Effort: Pulmonary effort is normal. No respiratory distress.      Breath sounds: Normal breath sounds. No wheezing or rales.   Musculoskeletal:      Cervical back: Normal range of motion and neck supple.      Comments: Pain with ROM above 100 degrees flexion and abduction. No sig pain with internal rotation.   Kapoor Christos is equivocal   Normal Speed's, empty can tests.    Lymphadenopathy:      Cervical: No cervical adenopathy.   Skin:     General: Skin is warm and dry.      Findings: No erythema or rash.   Neurological:      Mental Status: He is alert and oriented to person, place, and time.      Gait: Gait is intact.   Psychiatric:         Mood and Affect: Mood and affect normal.         Cognition and Memory: Memory normal.         Judgment: Judgment normal.         ASSESSMENT & PLAN:     Routine general medical examination at a health care facility  -     Lipid Panel; Future; Expected date: 06/14/2022  -     CBC Auto Differential; Future; Expected date: 06/14/2022  -     Comprehensive Metabolic Panel; Future; Expected date: 06/14/2022  -     Hemoglobin A1C; Future; Expected date: 06/14/2022  -     TSH; Future; Expected date: 06/14/2022  -     PSA, Screening; Future; Expected date: 06/14/2022    Gout, unspecified cause, unspecified chronicity, unspecified site  Continue current meds    Primary hypertension  Continue current medicine.       Tendinitis of right rotator cuff  -     X-ray Shoulder 2 or More Views Right; Future; Expected date: 06/14/2022  Unsure exact pathology, but does not have complete tear.   2 weeks NSAIDs. Xrays. If not improved will have him see ortho.        Great job on the weight loss! Keep it up      Johan Chamberlain MD   Internal Medicine    This note was created using Dragon voice recognition software that  occasionally misinterprets phrases or words.

## 2022-06-14 NOTE — PATIENT INSTRUCTIONS
Great work on the healthy changes!!!!  Keep it up!      Let's get blood tests in 3 months.           Miguelangel Do,     If you are due for any health screening(s) below please notify me so we can arrange them to be ordered and scheduled to maintain your health. Most healthy patients complete it. Don't lose out on improving your health.     Health Maintenance   Topic Date Due    Lipid Panel  06/15/2026    TETANUS VACCINE  08/31/2027    Hepatitis C Screening  Completed

## 2022-06-15 ENCOUNTER — TELEPHONE (OUTPATIENT)
Dept: ORTHOPEDICS | Facility: CLINIC | Age: 52
End: 2022-06-15

## 2022-06-15 DIAGNOSIS — G89.29 CHRONIC RIGHT SHOULDER PAIN: Primary | ICD-10-CM

## 2022-06-15 DIAGNOSIS — M25.511 CHRONIC RIGHT SHOULDER PAIN: Primary | ICD-10-CM

## 2022-06-16 ENCOUNTER — TELEPHONE (OUTPATIENT)
Dept: ORTHOPEDICS | Facility: CLINIC | Age: 52
End: 2022-06-16

## 2022-06-28 ENCOUNTER — OFFICE VISIT (OUTPATIENT)
Dept: ORTHOPEDICS | Facility: CLINIC | Age: 52
End: 2022-06-28
Payer: COMMERCIAL

## 2022-06-28 VITALS — WEIGHT: 295 LBS | BODY MASS INDEX: 49.15 KG/M2 | HEIGHT: 65 IN | RESPIRATION RATE: 18 BRPM

## 2022-06-28 DIAGNOSIS — M25.511 CHRONIC RIGHT SHOULDER PAIN: ICD-10-CM

## 2022-06-28 DIAGNOSIS — G89.29 CHRONIC RIGHT SHOULDER PAIN: ICD-10-CM

## 2022-06-28 PROCEDURE — 1160F PR REVIEW ALL MEDS BY PRESCRIBER/CLIN PHARMACIST DOCUMENTED: ICD-10-PCS | Mod: CPTII,S$GLB,, | Performed by: ORTHOPAEDIC SURGERY

## 2022-06-28 PROCEDURE — 99999 PR PBB SHADOW E&M-EST. PATIENT-LVL IV: ICD-10-PCS | Mod: PBBFAC,,, | Performed by: ORTHOPAEDIC SURGERY

## 2022-06-28 PROCEDURE — 99204 OFFICE O/P NEW MOD 45 MIN: CPT | Mod: S$GLB,,, | Performed by: ORTHOPAEDIC SURGERY

## 2022-06-28 PROCEDURE — 4010F ACE/ARB THERAPY RXD/TAKEN: CPT | Mod: CPTII,S$GLB,, | Performed by: ORTHOPAEDIC SURGERY

## 2022-06-28 PROCEDURE — 99204 PR OFFICE/OUTPT VISIT, NEW, LEVL IV, 45-59 MIN: ICD-10-PCS | Mod: S$GLB,,, | Performed by: ORTHOPAEDIC SURGERY

## 2022-06-28 PROCEDURE — 1160F RVW MEDS BY RX/DR IN RCRD: CPT | Mod: CPTII,S$GLB,, | Performed by: ORTHOPAEDIC SURGERY

## 2022-06-28 PROCEDURE — 3008F BODY MASS INDEX DOCD: CPT | Mod: CPTII,S$GLB,, | Performed by: ORTHOPAEDIC SURGERY

## 2022-06-28 PROCEDURE — 4010F PR ACE/ARB THEARPY RXD/TAKEN: ICD-10-PCS | Mod: CPTII,S$GLB,, | Performed by: ORTHOPAEDIC SURGERY

## 2022-06-28 PROCEDURE — 1159F MED LIST DOCD IN RCRD: CPT | Mod: CPTII,S$GLB,, | Performed by: ORTHOPAEDIC SURGERY

## 2022-06-28 PROCEDURE — 1159F PR MEDICATION LIST DOCUMENTED IN MEDICAL RECORD: ICD-10-PCS | Mod: CPTII,S$GLB,, | Performed by: ORTHOPAEDIC SURGERY

## 2022-06-28 PROCEDURE — 3008F PR BODY MASS INDEX (BMI) DOCUMENTED: ICD-10-PCS | Mod: CPTII,S$GLB,, | Performed by: ORTHOPAEDIC SURGERY

## 2022-06-28 PROCEDURE — 99999 PR PBB SHADOW E&M-EST. PATIENT-LVL IV: CPT | Mod: PBBFAC,,, | Performed by: ORTHOPAEDIC SURGERY

## 2022-06-28 NOTE — PROGRESS NOTES
CC:  51-year-old male presents for evaluation of right shoulder pain.  The patient states he had pain that started in the right shoulder several months ago.  He states that there was no particular injury it was insidious in onset.  He currently rates as a 5/10.  He states he cannot lift his arm without pain.  He says the pain is located on the anterior aspect of his shoulder.  He states he only has pain with movement.  He does not have pain at rest.    Past Medical History:   Diagnosis Date    Anemia     Arthritis     Broken foot     Left side (fell down a ladder)    Gout     Hernia, abdominal     Hyperlipidemia     Hypertension     EVGENY on CPAP     Vitamin D deficiency        Past Surgical History:   Procedure Laterality Date    COLONOSCOPY N/A 7/23/2021    Procedure: COLONOSCOPY;  Surgeon: Emily Valente MD;  Location: [facility];  Service: Endoscopy;  Laterality: N/A;    HERNIA REPAIR         Current Outpatient Medications on File Prior to Visit   Medication Sig Dispense Refill    allopurinoL (ZYLOPRIM) 300 MG tablet Take 1 tablet (300 mg total) by mouth once daily. 90 tablet 1    amLODIPine (NORVASC) 10 MG tablet       ascorbic acid, vitamin C, (VITAMIN C) 1000 MG tablet Take 1,000 mg by mouth once daily.      aspirin (ECOTRIN) 81 MG EC tablet Take 81 mg by mouth once daily.      ciclopirox (PENLAC) 8 % Soln ?Apply nail lacquer 8% solution once daily to affected nails with applicator brush, preferably at bedtime or 8 hours before washing; remove with alcohol every 7 days 6.6 mL 2    ergocalciferol (ERGOCALCIFEROL) 50,000 unit Cap TAKE 1 CAPSULE EVERY 7 DAYS 12 capsule 1    hydroCHLOROthiazide (HYDRODIURIL) 12.5 MG Tab Take 1 tablet (12.5 mg total) by mouth once daily. 90 tablet 3    metoprolol succinate (TOPROL-XL) 100 MG 24 hr tablet Take 1 tablet (100 mg total) by mouth once daily. 90 tablet 1    multivitamin (MULTIPLE VITAMINS ORAL) Take 1 tablet by mouth once daily.      omega-3 fatty  acids-fish oil 435-880 mg Cap Take 1 capsule by mouth once daily.      orlistat (ROMAN) 60 MG capsule Take 1 capsule (60 mg total) by mouth 3 (three) times daily with meals. 90 capsule 11    tadalafiL (CIALIS) 5 MG tablet Take 1 tablet by mouth once daily 15 tablet 0    valsartan (DIOVAN) 320 MG tablet TAKE 1 TABLET DAILY WITH FOOD 90 tablet 3     No current facility-administered medications on file prior to visit.     ROS:    Constitution: Denies chills, fever, and sweats.  HENT: Denies headaches or blurry vision.  Cardiovascular: Denies chest pain or irregular heart beat.  Respiratory: Denies cough or shortness of breath.  Gastrointestinal: Denies abdominal pain, nausea, or vomiting.  Genitourinary:  Denies urinary incontinence, bladder and kidney issues  Musculoskeletal:  Denies muscle cramps.  Neurological: Denies dizziness or focal weakness.  Psychiatric/Behavioral: Normal mental status.  Hematologic/Lymphatic: Denies bleeding problem or easy bruising/bleeding.  Skin: Denies rash or suspicious lesions.    Physical examination     Gen - No acute distress, well nourished, well groomed   Eyes - Extraoccular motions intact, pupils equally round and reactive to light and accommodation   ENT - normocephalic, atruamtic, oropharynx clear   Neck - Supple, no abnormal masses   Cardiovascular - regular rate and rhythm   Pulmonary - clear to auscultation bilaterally, no wheezes, ronchi, or rales   Abdomen - soft, non-tender, non-distended, positive bowel sounds   Psych - The patient is alert and oriented x3 with normal mood and affect    Right Upper Extremity Examination     Skin is intact throughout   Motor is intact distally radial, median, ulnar, AIN, PIN   +2 radial and ulnar pulses   Sensation to light touch is intact distally radial, median, and ulnar     Examination of the Right shoulder:   ROM:   For - 150   Abd - 150   Ext - 50   Int - T12     Tenderness to palpation:   Subacromial space - negative  Biceps  Tendon - positive  Anterior Glenohumeral Joint - positive  AC joint - negative  Glenohumeral instability - negative  Empty Can test - negative  Speeds test - positive  Kapoor/Neers sign - positive  Cross-arm adduction test - negative    X-ray images were examined and personally interpreted by me.  Three views the right shoulder dated 06/14/2022 show a loose body just anterior to the humerus likely adjacent to the biceps tendon.  Joint space is well maintained with no advanced arthritic changes and otherwise normal exam.    Dx:  Right shoulder pain with loose body adjacent to the biceps tendon    Plan:  Offered the patient an intra-articular injection he agreed.  We injected the shoulder with mixture of 2, 2, 1. He tolerated that well.  We are going to refer her to physical therapy and have him follow up in 4 weeks.  If he continues to have pain at that  Answers for HPI/ROS submitted by the patient on 6/28/2022  unexpected weight change: No  appetite change : No  sleep disturbance: No  IMMUNOCOMPROMISED: No  nervous/ anxious: No  dysphoric mood: No  rash: No  visual disturbance: No  eye redness: No  eye pain: No  ear pain: No  tinnitus: No  hearing loss: No  sinus pressure : No  nosebleeds: No  enviro allergies: No  food allergies: No  cough: No  shortness of breath: No  sweating: No  frequency: No  difficulty urinating: No  hematuria: No  chest pain: No  palpitations: No  nausea: No  vomiting: No  diarrhea: No  blood in stool: No  constipation: No  headaches: No  dizziness: No  numbness: No  seizures: No  joint swelling: No  myalgia: No  weakness: No  back pain: No  Pain Chronicity: new  History of trauma: No  Onset: more than 1 month ago  Frequency: daily  Progression since onset: gradually worsening  Injury mechanism: lifting  injury location: at home  pain- numeric: 4/10  pain location: left shoulder  pain quality: sharp  Radiating Pain: No  If your pain is radiating, to what part of the body?: left  shoulder  Aggravating factors: lying down  fever: No  inability to bear weight: No  itching: No  joint locking: No  limited range of motion: Yes  stiffness: No  tingling: No  Treatments tried: heat  physical therapy: not tried  Improvement on treatment: mild

## 2022-07-25 ENCOUNTER — CLINICAL SUPPORT (OUTPATIENT)
Dept: REHABILITATION | Facility: HOSPITAL | Age: 52
End: 2022-07-25
Attending: ORTHOPAEDIC SURGERY
Payer: COMMERCIAL

## 2022-07-25 DIAGNOSIS — R53.1 DECREASED STRENGTH: ICD-10-CM

## 2022-07-25 DIAGNOSIS — M62.89 MUSCLE TIGHTNESS: ICD-10-CM

## 2022-07-25 DIAGNOSIS — G89.29 CHRONIC RIGHT SHOULDER PAIN: ICD-10-CM

## 2022-07-25 DIAGNOSIS — M25.511 CHRONIC RIGHT SHOULDER PAIN: ICD-10-CM

## 2022-07-25 DIAGNOSIS — M25.60 DECREASED RANGE OF MOTION: ICD-10-CM

## 2022-07-25 PROCEDURE — 97161 PT EVAL LOW COMPLEX 20 MIN: CPT | Mod: PN

## 2022-07-25 PROCEDURE — 97110 THERAPEUTIC EXERCISES: CPT | Mod: PN

## 2022-07-25 NOTE — PLAN OF CARE
[facility]  Physical Therapy Initial Evaluation    Name: Hi Andres  Clinic Number: [id number]    Therapy Diagnosis  Encounter Diagnoses   Name Primary?    Chronic right shoulder pain     Decreased range of motion     Muscle tightness     Decreased strength       Physician: Parker Bernstein II, MD   Physician Orders: PT Eval and Treat  Medical Diagnosis from Referral: M25.511,G89.29 (ICD-10-CM) - Chronic right shoulder pain   Evaluation Date: 7/25/2022  Authorization Period Expiration: 06/28/2023   Plan of Care Expiration: 10/30/2022    Progress Update: 8/25/2022  FOTO: 1 / 3    Visit # / Visits authorized: 1 / 1      PRECAUTIONS: Standard Precautions     Time In: 0800  Time Out: 0850  Total Appointment Time (timed & untimed codes): 50 minutes    SUBJECTIVE     Chief Complaint:  R anterior shoulder pain    History of current condition:  Pain started about 2 months ago.  Denies specific incident.  Pain intensity and frequency has improved since onset.   States that he received a steroid injection a couple of weeks ago and it assisted with his pain.  States that he lifts heavy objects at work and has some difficulty doing so due to the pain.  Denies numbness/tingling, neck pain.  Dominant Extremity: Right    Aggravating Factors:  Raising arm overhead, lifting objects  Easing Factors: motrin    Imaging:      A 6 mm well corticated rounded density projects along the inferior margin of the glenohumeral joint, possibly an intra-articular body.  No other acute fracture.  No malalignment.  Partially imaged degenerative change in the lower cervical spine.  Relative preservation of the AC and glenohumeral joint spaces.  Prominent spurs along the acromion including a keel spur.        Electronically signed by: Ancelmo Kumar  Date:                                            06/14/2022  Time:                                           09:58    Prior Therapy: N/A  Social History: Pt lives  with his spouse.  Prior Level of Function: Independent with all ADLs  Current Level of Function: 85% of PLOF    Pain:  Current 0 /10, worst 5 /10, best 0 /10   Description: Throbbing and Sharp    Pts goals: Pt reported goal is to be able to decrease pain     _______________________________________________________  Medical History:   Past Medical History:   Diagnosis Date    Anemia     Arthritis     Broken foot     Left side (fell down a ladder)    Gout     Hernia, abdominal     Hyperlipidemia     Hypertension     EVGENY on CPAP     Vitamin D deficiency        Surgical History:   Hi Andres  has a past surgical history that includes Hernia repair and Colonoscopy (N/A, 7/23/2021).    Medications:   Hi has a current medication list which includes the following prescription(s): allopurinol, amlodipine, ascorbic acid (vitamin c), aspirin, ciclopirox, ergocalciferol, hydrochlorothiazide, metoprolol succinate, multivitamin, omega-3 fatty acids-fish oil, orlistat, tadalafil, and valsartan.    Allergies:   Review of patient's allergies indicates:  No Known Allergies     OBJECTIVE   (x = not tested due to pain and/or inability to obtain test position)    RANGE OF MOTION:      Shoulder AROM/PROM Right  7/25/2022 Left  7/25/2022 Goal   Forward Flexion (180) wfl c pain > 90 deg wfl 180     Abduction (180) wfl c pain > 90 deg wfl 180   ER at 90 degrees (90) wfl wfl 90     ER at 0 degrees (45)  wfl wfl 45     Functional ER (C7) wfl wfl C7     Functional IR (T10) wfl wfl T10       STRENGTH:    U/E MMT Right  7/25/2022 Goal   Shoulder Flexion 4-/5 c pain 5/5 B   Shoulder Abduction 4-/5 c pain 5/5 B   Shoulder IR 4-/5 5/5 B   Shoulder ER 4-/5 5/5 B   Elbow Flexion  4/5 5/5 B   Elbow Extension 4/5 5/5 B     U/E MMT Left  7/25/2022 Goal   Shoulder Flexion 4/5 5/5 B   Shoulder Abduction 4/5 5/5 B   Shoulder IR 4/5 5/5 B   Shoulder ER 4/5 5/5 B   Elbow Flexion  4/5 5/5 B   Elbow Extension 4/5 5/5 B     MUSCLE  LENGTH:     Muscle Tested  Right  7/25/2022 Left   7/25/2022 Goal   Pectoralis Minor  decreased decreased Normal B   Pectoralis Major decreased decreased Normal B       SPECIAL TESTS:     Right  7/25/2022 Left   7/25/2022 Goal   Kapoor Christos Positive Negative Negative B    Neers Positive Negative Negative B    Empty Can Positive Negative Negative B    Full Can Positive Negative Negative B    Sulcus Sign Negative Negative Negative B    Drop arm Negative Negative    Obriens Positive Negative    Apprehension Negative Negative        Observation:  No edema, ecchymosis noted.    Sensation:  Sensation is intact to light touch    Palpation: Increased tone and tenderness noted with palpation to the R biceps tendon.    Posture:  Pt presents with postural abnormalities which include: forward head and rounded shoulders    Gait: N/A    Movement Analysis: N/A    Balance: N/A      FUNCTION:         TREATMENT     Total Treatment time separate from Evaluation: (10) minutes    Hi received therapeutic exercises to develop strength, endurance, ROM, flexibility, and posture for (10) minutes including: x = exercises performed     TherEx 7/25/2022    IR with red tb x 3x10   ER with red tb x 3x10   Scaption with red tb x 3x10          Plan for Next Visit:     UBE 3/3  Supine shoulder flexion with dowel 10#  3x10  Supine SA punches with dowel  3x10  Sidelying ER 2#  3x10  Open books  2x10 B  Seated thoracic extension with foam roll  x20  Upper trap stretch  3x30 secs B  CC Rows  7#  3x10  CC Shoulder extension 7#  3x10  CC SA punches  7#  3x10  CC Chops 7#  3x10  Doorway stretch  3x30 secs B  IR/ER with red tb  3x10  Scaption with red tb  3x10       Home Exercises and Patient Education Provided    Education/Self-Care provided:   Patient educated on the impairments noted above and the effects of physical therapy intervention to improve overall condition and QOL.    Patient was educated on all the above exercise prior/during/after  for proper posture, positioning, and execution for safe performance with home exercise program.    Written Home Exercises Provided: yes. Prefers: Electronic Copies  Exercises were reviewed and Hi was able to demonstrate them prior to the end of the session.  Hi demonstrated good understanding of the education provided.     See EMR under Patient Instructions for exercises provided during therapy sessions.    ASSESSMENT     Hi is a 51 y.o. male referred to outpatient Physical Therapy with a medical diagnosis of M25.511,G89.29 (ICD-10-CM) - Chronic right shoulder pain   .  Hi presents with clinical signs and symptoms that support this diagnosis with decreased shoulder girdle ROM, decreased scapular and shoulder strength, Glenohumeral joint hypomobility, and impaired functional mobility.    The above impairments will be addressed through manual therapy techniques, therapeutic exercises, functional training, and modalities as necessary. Patient was treated and educated on exercises for home program, progression of therapy, and benefits of therapy to achieve full functional mobility.     Pt prognosis is Good.   Pt will benefit from skilled outpatient Physical Therapy to address the deficits stated above and in the chart below, provide pt/family education, and to maximize pt's level of independence.     Plan of care discussed with patient: Yes  Pt's spiritual, cultural and educational needs considered and patient is agreeable to the plan of care and goals as stated below:     Anticipated Barriers for therapy: none    Medical Necessity is demonstrated by the following  History  Co-morbidities and personal factors that may impact the plan of care Co-morbidities:   high BMI    Personal Factors:   no deficits     low   Examination  Body Structures and Functions, activity limitations and participation restrictions that may impact the plan of care Body Regions:   upper extremities    Body Systems:   "  gross symmetry  ROM  strength  gross coordinated movement  motor control  motor learning    Participation Restrictions:   See above in "Current Level of Function"     Activity limitations:   Learning and applying knowledge  no deficits    General Tasks and Commands  no deficits    Communication  no deficits    Mobility  no deficits    Self care  no deficits    Domestic Life  no deficits    Interactions/Relationships  no deficits    Life Areas  no deficits    Community and Social Life  community life  recreation and leisure  no deficits         low   Clinical Presentation stable and uncomplicated low   Decision Making/ Complexity Score: low       GOALS:  SHORT TERM GOALS: 4 weeks 7/25/2022   1. Recent signs and systems trend is improving in order to progress towards LTG's.    2. Patient will be independent with HEP in order to further progress and return to maximal function.    3. Pain rating at Worst: 5/10 in order to progress towards increased independence with activity.    4. Patient will be able to correct postural deviations in sitting and standing, to decrease pain and promote postural awareness for injury prevention.       LONG TERM GOALS: 8 weeks 7/25/2022   1. Patient will return to normal ADL, recreational, and work related activities with less pain and limitation.     2. Patient will improve AROM to stated goals in order to return to maximal functional potential.     3. Patient will improve Strength to stated goals of appropriate musculature in order to improve functional independence.     4. Pain Rating at Best: 1/10 to improve Quality of Life.     5. Patient will meet predicted functional outcome (FOTO) score: 80% to increase self-worth & perceived functional ability.    6. Patient will have met/partially met personal goal of being able to lift objects with no pain        PLAN   Plan of care Certification: 7/25/2022 to 10/30/2022    Outpatient Physical Therapy 2 times weekly for 6 weeks to include any " combination of the following interventions: virtual visits, dry needling, modalities, electrical stimulation (IFC, Pre-Mod, Attended with Functional Dry Needling), Manual Therapy, Moist Heat/ Ice, Neuromuscular Re-ed, Patient Education, Self Care, Therapeutic Exercise, Functional Training and Therapeutic Activites     Thank you for this referral.    These services are reasonable and necessary for the conditions set forth above while under my care.    Mike Fernández, PT, DPT

## 2022-07-26 ENCOUNTER — OFFICE VISIT (OUTPATIENT)
Dept: ORTHOPEDICS | Facility: CLINIC | Age: 52
End: 2022-07-26
Payer: COMMERCIAL

## 2022-07-26 VITALS — WEIGHT: 295 LBS | HEIGHT: 65 IN | RESPIRATION RATE: 18 BRPM | BODY MASS INDEX: 49.15 KG/M2

## 2022-07-26 DIAGNOSIS — G89.29 CHRONIC RIGHT SHOULDER PAIN: Primary | ICD-10-CM

## 2022-07-26 DIAGNOSIS — M25.511 CHRONIC RIGHT SHOULDER PAIN: Primary | ICD-10-CM

## 2022-07-26 PROCEDURE — 1159F MED LIST DOCD IN RCRD: CPT | Mod: CPTII,S$GLB,, | Performed by: ORTHOPAEDIC SURGERY

## 2022-07-26 PROCEDURE — 99999 PR PBB SHADOW E&M-EST. PATIENT-LVL III: CPT | Mod: PBBFAC,,, | Performed by: ORTHOPAEDIC SURGERY

## 2022-07-26 PROCEDURE — 99999 PR PBB SHADOW E&M-EST. PATIENT-LVL III: ICD-10-PCS | Mod: PBBFAC,,, | Performed by: ORTHOPAEDIC SURGERY

## 2022-07-26 PROCEDURE — 1159F PR MEDICATION LIST DOCUMENTED IN MEDICAL RECORD: ICD-10-PCS | Mod: CPTII,S$GLB,, | Performed by: ORTHOPAEDIC SURGERY

## 2022-07-26 PROCEDURE — 4010F PR ACE/ARB THEARPY RXD/TAKEN: ICD-10-PCS | Mod: CPTII,S$GLB,, | Performed by: ORTHOPAEDIC SURGERY

## 2022-07-26 PROCEDURE — 3008F PR BODY MASS INDEX (BMI) DOCUMENTED: ICD-10-PCS | Mod: CPTII,S$GLB,, | Performed by: ORTHOPAEDIC SURGERY

## 2022-07-26 PROCEDURE — 99212 OFFICE O/P EST SF 10 MIN: CPT | Mod: S$GLB,,, | Performed by: ORTHOPAEDIC SURGERY

## 2022-07-26 PROCEDURE — 4010F ACE/ARB THERAPY RXD/TAKEN: CPT | Mod: CPTII,S$GLB,, | Performed by: ORTHOPAEDIC SURGERY

## 2022-07-26 PROCEDURE — 1160F RVW MEDS BY RX/DR IN RCRD: CPT | Mod: CPTII,S$GLB,, | Performed by: ORTHOPAEDIC SURGERY

## 2022-07-26 PROCEDURE — 99212 PR OFFICE/OUTPT VISIT, EST, LEVL II, 10-19 MIN: ICD-10-PCS | Mod: S$GLB,,, | Performed by: ORTHOPAEDIC SURGERY

## 2022-07-26 PROCEDURE — 3008F BODY MASS INDEX DOCD: CPT | Mod: CPTII,S$GLB,, | Performed by: ORTHOPAEDIC SURGERY

## 2022-07-26 PROCEDURE — 1160F PR REVIEW ALL MEDS BY PRESCRIBER/CLIN PHARMACIST DOCUMENTED: ICD-10-PCS | Mod: CPTII,S$GLB,, | Performed by: ORTHOPAEDIC SURGERY

## 2022-07-26 NOTE — PROGRESS NOTES
CC:  51-year-old male follows up with right shoulder pain.  Four weeks ago we gave him an injection in his right shoulder.  He states the injection helped and he has less pain than before the injection.  He currently rates pain as a 3/10.    Right Upper Extremity Examination     Skin is intact throughout   Motor is intact distally radial, median, ulnar, AIN, PIN   +2 radial and ulnar pulses   Sensation to light touch is intact distally radial, median, and ulnar     Examination of the Right shoulder:   ROM:   For - 150   Abd - 150   Ext - 50   Int - T12     Tenderness to palpation:   Subacromial space - negative  Biceps Tendon - negative  Anterior Glenohumeral Joint - negative  AC joint - negative  Glenohumeral instability - negative  Empty Can test - negative  Speeds test - negative  Kapoor/Neers sign - negative  Cross-arm adduction test - negative    Dx:  Rotator cuff tendinitis right shoulder, improved with intra-articular injection.  Patient is starting physical therapy next week.    Plan:  Progress activity as tolerated.  Follow-up p.r.n..

## 2022-08-01 NOTE — PROGRESS NOTES
[facility]   Physical Therapy Treatment Note     Name: Hi Andres  Clinic Number: [id number]    Therapy Diagnosis:   Encounter Diagnoses   Name Primary?    Decreased range of motion Yes    Muscle tightness     Decreased strength      Physician: Parker Bernstein II, MD    Visit Date: 8/2/2022      Physician: Parker Bernstein II, MD   Physician Orders: PT Eval and Treat  Medical Diagnosis from Referral: M25.511,G89.29 (ICD-10-CM) - Chronic right shoulder pain   Evaluation Date: 7/25/2022  Authorization Period Expiration: 06/28/2023   Plan of Care Expiration: 10/30/2022                          Progress Update: 8/25/2022             FOTO: 1 / 3    Visit # / Visits authorized: 1 / 20                        PRECAUTIONS: Standard Precautions      PTA Visit #: 1/5     Time In: 935  Time Out: 1030  Total Billable Time: 55 minutes    SUBJECTIVE     Pt reports: no real complaints upon arrival.   He was compliant with home exercise program.  Response to previous treatment: no issues   Functional change: ongoing    Pain: 2/10  Location: right shoulder      OBJECTIVE     Objective Measures updated at progress report unless specified.     Treatment     Hi received the treatments listed below:      therapeutic exercises to develop strength, endurance, ROM and posture for 55 minutes including:    UBE 3/3  Supine shoulder flexion with dowel 1#  3x10  Supine SA punches with 1# dowel  3x10  Sidelying ER 2#  3x10  Open books  2x10 B  Seated thoracic extension with foam roll  x20  Upper trap stretch  3x30 secs B  CC Rows  7#  3x10  CC Shoulder extension 7#  3x10  CC SA punches  7#  3x10  CC Chops 7#  3x10  Doorway stretch  3x30 secs B  IR/ER with red tb  3x10  Bilateral external rotation RTB 3 x 10   Scaption with red tb 3x10       Patient Education and Home Exercises     Home Exercises Provided and Patient Education Provided     Education provided:   - HEP    Written Home Exercises  Provided: Patient instructed to cont prior HEP. Exercises were reviewed and Hi was able to demonstrate them prior to the end of the session.  Hi demonstrated good  understanding of the education provided. See EMR under Patient Instructions for exercises provided during therapy sessions.     ASSESSMENT     Patient tolerated session well with focus on right shoulder cuff strengthening to decrease discomfort and improve OH function. Challenged with ER, but able to perform well with minimal cueing to reduce forward posture. Shoulder hiking noted during scaption to shoulder height with resistance and slight increase in discomfort. Continue to progress.     Hi Is progressing well towards his goals.   Pt prognosis is Good.     Pt will continue to benefit from skilled outpatient physical therapy to address the deficits listed in the problem list box on initial evaluation, provide pt/family education and to maximize pt's level of independence in the home and community environment.     Pt's spiritual, cultural and educational needs considered and pt agreeable to plan of care and goals.     Anticipated barriers to physical therapy: none     Goals:   SHORT TERM GOALS: 4 weeks 7/25/2022   1. Recent signs and systems trend is improving in order to progress towards LTG's. Not met     2. Patient will be independent with HEP in order to further progress and return to maximal function.  Not met    3. Pain rating at Worst: 5/10 in order to progress towards increased independence with activity.  Not met    4. Patient will be able to correct postural deviations in sitting and standing, to decrease pain and promote postural awareness for injury prevention.   Not met       LONG TERM GOALS: 8 weeks 7/25/2022   1. Patient will return to normal ADL, recreational, and work related activities with less pain and limitation.  Not met     2. Patient will improve AROM to stated goals in order to return to maximal functional  potential.   Not met    3. Patient will improve Strength to stated goals of appropriate musculature in order to improve functional independence.   Not met    4. Pain Rating at Best: 1/10 to improve Quality of Life.   Not met    5. Patient will meet predicted functional outcome (FOTO) score: 80% to increase self-worth & perceived functional ability.  Not met    6. Patient will have met/partially met personal goal of being able to lift objects with no pain  Not met           PLAN     Continue per PT plan of care.    Alondra Jose, PTA

## 2022-08-02 ENCOUNTER — CLINICAL SUPPORT (OUTPATIENT)
Dept: REHABILITATION | Facility: HOSPITAL | Age: 52
End: 2022-08-02
Attending: ORTHOPAEDIC SURGERY
Payer: COMMERCIAL

## 2022-08-02 DIAGNOSIS — M62.89 MUSCLE TIGHTNESS: ICD-10-CM

## 2022-08-02 DIAGNOSIS — M25.60 DECREASED RANGE OF MOTION: Primary | ICD-10-CM

## 2022-08-02 DIAGNOSIS — R53.1 DECREASED STRENGTH: ICD-10-CM

## 2022-08-02 PROCEDURE — 97110 THERAPEUTIC EXERCISES: CPT | Mod: PN,CQ

## 2022-08-04 ENCOUNTER — CLINICAL SUPPORT (OUTPATIENT)
Dept: REHABILITATION | Facility: HOSPITAL | Age: 52
End: 2022-08-04
Attending: ORTHOPAEDIC SURGERY
Payer: COMMERCIAL

## 2022-08-04 DIAGNOSIS — M25.60 DECREASED RANGE OF MOTION: Primary | ICD-10-CM

## 2022-08-04 DIAGNOSIS — M62.89 MUSCLE TIGHTNESS: ICD-10-CM

## 2022-08-04 DIAGNOSIS — R53.1 DECREASED STRENGTH: ICD-10-CM

## 2022-08-04 PROCEDURE — 97112 NEUROMUSCULAR REEDUCATION: CPT | Mod: PN

## 2022-08-04 PROCEDURE — 97110 THERAPEUTIC EXERCISES: CPT | Mod: PN

## 2022-08-04 PROCEDURE — 97140 MANUAL THERAPY 1/> REGIONS: CPT | Mod: PN

## 2022-08-04 NOTE — PROGRESS NOTES
[facility]   Physical Therapy Treatment Note     Name: iH Andres  Clinic Number: [id number]    Therapy Diagnosis:   Encounter Diagnoses   Name Primary?    Decreased range of motion Yes    Muscle tightness     Decreased strength      Physician: Parker Bernstein II, MD    Visit Date: 8/4/2022      Physician: Parker Bernstein II, MD   Physician Orders: PT Eval and Treat  Medical Diagnosis from Referral: M25.511,G89.29 (ICD-10-CM) - Chronic right shoulder pain   Evaluation Date: 7/25/2022  Authorization Period Expiration: 06/28/2023   Plan of Care Expiration: 10/30/2022                          Progress Update: 8/25/2022             FOTO: 1 / 3    Visit # / Visits authorized: 2 / 20                        PRECAUTIONS: Standard Precautions      PTA Visit #: 1/5     Time In: 0830  Time Out: 0925  Total Billable Time: 55 minutes    SUBJECTIVE     Pt reports: no real complaints upon arrival.   He was compliant with home exercise program.  Response to previous treatment: no issues   Functional change: ongoing    Pain: 2/10  Location: right shoulder      OBJECTIVE     Objective Measures updated at progress report unless specified.     Treatment     Hi received the treatments listed below:      therapeutic exercises to develop strength, endurance, ROM and posture for 39 minutes including:    UBE 3/3  Supine shoulder flexion with dowel 1#  3x10  Supine SA punches with 1# dowel  3x10  Sidelying ER 2#  3x10  Open books  2x10 B  Seated thoracic extension with foam roll  x20  Upper trap stretch  3x30 secs B  CC Rows  7#  3x10  CC Shoulder extension 7#  3x10   CC SA punches  7#  3x10  CC Chops 7#  3x10  Doorway stretch  3x30 secs B    Neuro reeducation x 8 mins including:      IR/ER with red tb  3x10  Bilateral external rotation RTB 3 x 10   Scaption with red tb 3x10     Manual Therapy x 8 mins including:    IASTM to R upper trap  R GH A/P mobs 3x30  R GH caudal mobs 3x30      Patient  Education and Home Exercises     Home Exercises Provided and Patient Education Provided     Education provided:   - HEP    Written Home Exercises Provided: Patient instructed to cont prior HEP. Exercises were reviewed and Hi was able to demonstrate them prior to the end of the session.  Hi demonstrated good  understanding of the education provided. See EMR under Patient Instructions for exercises provided during therapy sessions.     ASSESSMENT     Patient tolerated session well with focus on right shoulder cuff strengthening to decrease discomfort and improve OH function. Challenged with ER, but able to perform well with minimal cueing to reduce forward posture. Shoulder hiking noted during scaption to shoulder height with resistance and slight increase in discomfort. Continue to progress.     Hi Is progressing well towards his goals.   Pt prognosis is Good.     Pt will continue to benefit from skilled outpatient physical therapy to address the deficits listed in the problem list box on initial evaluation, provide pt/family education and to maximize pt's level of independence in the home and community environment.     Pt's spiritual, cultural and educational needs considered and pt agreeable to plan of care and goals.     Anticipated barriers to physical therapy: none     Goals:   SHORT TERM GOALS: 4 weeks 7/25/2022   1. Recent signs and systems trend is improving in order to progress towards LTG's. Not met     2. Patient will be independent with HEP in order to further progress and return to maximal function.  Not met    3. Pain rating at Worst: 5/10 in order to progress towards increased independence with activity.  Not met    4. Patient will be able to correct postural deviations in sitting and standing, to decrease pain and promote postural awareness for injury prevention.   Not met       LONG TERM GOALS: 8 weeks 7/25/2022   1. Patient will return to normal ADL, recreational, and work related  activities with less pain and limitation.  Not met     2. Patient will improve AROM to stated goals in order to return to maximal functional potential.   Not met    3. Patient will improve Strength to stated goals of appropriate musculature in order to improve functional independence.   Not met    4. Pain Rating at Best: 1/10 to improve Quality of Life.   Not met    5. Patient will meet predicted functional outcome (FOTO) score: 80% to increase self-worth & perceived functional ability.  Not met    6. Patient will have met/partially met personal goal of being able to lift objects with no pain  Not met           PLAN     Continue per PT plan of care.    Mike Fernández, PT

## 2022-08-08 DIAGNOSIS — I10 ESSENTIAL HYPERTENSION: ICD-10-CM

## 2022-08-08 RX ORDER — METOPROLOL SUCCINATE 100 MG/1
TABLET, EXTENDED RELEASE ORAL
Qty: 90 TABLET | Refills: 3 | Status: SHIPPED | OUTPATIENT
Start: 2022-08-08 | End: 2023-08-01

## 2022-08-08 NOTE — TELEPHONE ENCOUNTER
No new care gaps identified.  [facility] Embedded Care Gaps. Reference number: [id number]. 8/08/2022   12:14:48 AM CDT

## 2022-08-08 NOTE — TELEPHONE ENCOUNTER
Refill Decision Note   Hi Andres  is requesting a refill authorization.  Brief Assessment and Rationale for Refill:  Approve     Medication Therapy Plan:       Medication Reconciliation Completed: No   Comments:     No Care Gaps recommended.     Note composed:8:02 AM 08/08/2022

## 2022-08-16 ENCOUNTER — CLINICAL SUPPORT (OUTPATIENT)
Dept: REHABILITATION | Facility: HOSPITAL | Age: 52
End: 2022-08-16
Attending: ORTHOPAEDIC SURGERY
Payer: COMMERCIAL

## 2022-08-16 DIAGNOSIS — R53.1 DECREASED STRENGTH: ICD-10-CM

## 2022-08-16 DIAGNOSIS — M62.89 MUSCLE TIGHTNESS: ICD-10-CM

## 2022-08-16 DIAGNOSIS — M25.60 DECREASED RANGE OF MOTION: Primary | ICD-10-CM

## 2022-08-16 PROCEDURE — 97112 NEUROMUSCULAR REEDUCATION: CPT | Mod: PN

## 2022-08-16 PROCEDURE — 97110 THERAPEUTIC EXERCISES: CPT | Mod: PN

## 2022-08-16 NOTE — PROGRESS NOTES
[facility]   Physical Therapy Treatment Note     Name: Hi Andres  Clinic Number: [id number]    Therapy Diagnosis:   Encounter Diagnoses   Name Primary?    Decreased range of motion Yes    Muscle tightness     Decreased strength      Physician: Parker Bernstein II, MD    Visit Date: 8/16/2022      Physician: Parker Bernstein II, MD   Physician Orders: PT Eval and Treat  Medical Diagnosis from Referral: M25.511,G89.29 (ICD-10-CM) - Chronic right shoulder pain   Evaluation Date: 7/25/2022  Authorization Period Expiration: 06/28/2023   Plan of Care Expiration: 10/30/2022                          Progress Update: 8/25/2022             FOTO: 1 / 3    Visit # / Visits authorized: 3 / 20                        PRECAUTIONS: Standard Precautions      PTA Visit #: 1/5     Time In: 0930  Time Out: 1025  Total Billable Time: 55 minutes    SUBJECTIVE     Pt reports: that he is about the same as last visit.   He was compliant with home exercise program.  Response to previous treatment: no issues   Functional change: ongoing    Pain: 4/10  Location: right shoulder      OBJECTIVE     Objective Measures updated at progress report unless specified.     Treatment     Hi received the treatments listed below:      therapeutic exercises to develop strength, endurance, ROM and posture for 47 minutes including:  During 47 minutes of therapeutic exercise, Hi, was supervised by a rehabilitation technician under the direction of the treating therapist.    UBE 3/3  Supine shoulder flexion with dowel 1#  3x10  Supine SA punches with 1# dowel  3x10  Sidelying ER 2#  3x10  Open books  2x10 B  Seated thoracic extension with foam roll  x20  Upper trap stretch  3x30 secs B5  CC Rows  7#  3x10  CC Shoulder extension 7#  3x10   CC SA punches  7#  3x10 - NP  CC Chops 7#  3x10  Doorway stretch  3x30 secs B    Neuro reeducation x 8 mins including:      IR/ER with red tb  3x10  Bilateral external  rotation RTB 3 x 10   Scaption with red tb 3x10     Manual Therapy x 0 mins including:    IASTM to R upper trap  R GH A/P mobs 3x30  R GH caudal mobs 3x30      Patient Education and Home Exercises     Home Exercises Provided and Patient Education Provided     Education provided:   - HEP    Written Home Exercises Provided: Patient instructed to cont prior HEP. Exercises were reviewed and Hi was able to demonstrate them prior to the end of the session.  Hi demonstrated good  understanding of the education provided. See EMR under Patient Instructions for exercises provided during therapy sessions.     ASSESSMENT     Patient tolerated session well with focus on right shoulder cuff strengthening to decrease discomfort and improve OH function. Challenged with ER, but able to perform well with minimal cueing to reduce forward posture. Shoulder hiking noted during scaption to shoulder height with resistance and slight increase in discomfort. Continue to progress.     Hi Is progressing well towards his goals.   Pt prognosis is Good.     Pt will continue to benefit from skilled outpatient physical therapy to address the deficits listed in the problem list box on initial evaluation, provide pt/family education and to maximize pt's level of independence in the home and community environment.     Pt's spiritual, cultural and educational needs considered and pt agreeable to plan of care and goals.     Anticipated barriers to physical therapy: none     Goals:   SHORT TERM GOALS: 4 weeks 7/25/2022   1. Recent signs and systems trend is improving in order to progress towards LTG's. Not met     2. Patient will be independent with HEP in order to further progress and return to maximal function.  Not met    3. Pain rating at Worst: 5/10 in order to progress towards increased independence with activity.  Not met    4. Patient will be able to correct postural deviations in sitting and standing, to decrease pain and  promote postural awareness for injury prevention.   Not met       LONG TERM GOALS: 8 weeks 7/25/2022   1. Patient will return to normal ADL, recreational, and work related activities with less pain and limitation.  Not met     2. Patient will improve AROM to stated goals in order to return to maximal functional potential.   Not met    3. Patient will improve Strength to stated goals of appropriate musculature in order to improve functional independence.   Not met    4. Pain Rating at Best: 1/10 to improve Quality of Life.   Not met    5. Patient will meet predicted functional outcome (FOTO) score: 80% to increase self-worth & perceived functional ability.  Not met    6. Patient will have met/partially met personal goal of being able to lift objects with no pain  Not met           PLAN     Continue per PT plan of care.    Mike Fernández, PT

## 2022-08-18 ENCOUNTER — CLINICAL SUPPORT (OUTPATIENT)
Dept: REHABILITATION | Facility: HOSPITAL | Age: 52
End: 2022-08-18
Attending: ORTHOPAEDIC SURGERY
Payer: COMMERCIAL

## 2022-08-18 DIAGNOSIS — M62.89 MUSCLE TIGHTNESS: ICD-10-CM

## 2022-08-18 DIAGNOSIS — M25.60 DECREASED RANGE OF MOTION: Primary | ICD-10-CM

## 2022-08-18 DIAGNOSIS — R53.1 DECREASED STRENGTH: ICD-10-CM

## 2022-08-18 PROCEDURE — 97112 NEUROMUSCULAR REEDUCATION: CPT | Mod: PN,CQ

## 2022-08-18 PROCEDURE — 97110 THERAPEUTIC EXERCISES: CPT | Mod: PN,CQ

## 2022-08-18 NOTE — PROGRESS NOTES
[facility]   Physical Therapy Treatment Note     Name: Hi Andres  Clinic Number: [id number]    Therapy Diagnosis:   Encounter Diagnoses   Name Primary?    Decreased range of motion Yes    Muscle tightness     Decreased strength      Physician: Parker Bernstein II, MD    Visit Date: 8/18/2022      Physician: Parker Bernstein II, MD   Physician Orders: PT Eval and Treat  Medical Diagnosis from Referral: M25.511,G89.29 (ICD-10-CM) - Chronic right shoulder pain   Evaluation Date: 7/25/2022  Authorization Period Expiration: 06/28/2023   Plan of Care Expiration: 10/30/2022                          Progress Update: 8/25/2022             FOTO: 1 / 3    Visit # / Visits authorized: 4 / 20                        PRECAUTIONS: Standard Precautions      PTA Visit #: 1/5     Time In: 0930  Time Out: 1025  Total Billable Time: 55 minutes    SUBJECTIVE     Pt reports: feels he can do more since starting therapy.   He was compliant with home exercise program.  Response to previous treatment: no issues   Functional change: ongoing    Pain: 2/10  Location: right shoulder      OBJECTIVE     Objective Measures updated at progress report unless specified.     Treatment     Hi received the treatments listed below:      therapeutic exercises to develop strength, endurance, ROM and posture for 47 minutes including:    UBE 3/3  Supine shoulder flexion with dowel 3#  3x10  Supine SA punches with 3# dowel  3x10  Sidelying ER 3#  3x10  Side lying abduction 3 x 10   Open books  2x10 B  Seated thoracic extension with towel roll  x20  Upper trap stretch  3x30 secs Bilateral   CC Rows  7#  3x10  CC Shoulder extension 7#  3x10   CC SA punches  7#  3x10 - NP  CC Chops 7#  3x10  Doorway stretch  3x30 secs B    Neuro reeducation x 8 mins including:      IR/ER with red tb  3x10  Bilateral external rotation RTB 3 x 10   Scaption with red tb 3x10     Manual Therapy x 0 mins including:    IASTM to R upper  trap  R GH A/P mobs 3x30  R GH caudal mobs 3x30      Patient Education and Home Exercises     Home Exercises Provided and Patient Education Provided     Education provided:   - HEP    Written Home Exercises Provided: Patient instructed to cont prior HEP. Exercises were reviewed and Hi was able to demonstrate them prior to the end of the session.  Hi demonstrated good  understanding of the education provided. See EMR under Patient Instructions for exercises provided during therapy sessions.     ASSESSMENT     Patient tolerated session well and continues to show improvements with tolerance and decreased pain. Still challenged with overhead strength with discomfort reported but progressing fairly well. Continue to progress.     Hi Is progressing well towards his goals.   Pt prognosis is Good.     Pt will continue to benefit from skilled outpatient physical therapy to address the deficits listed in the problem list box on initial evaluation, provide pt/family education and to maximize pt's level of independence in the home and community environment.     Pt's spiritual, cultural and educational needs considered and pt agreeable to plan of care and goals.     Anticipated barriers to physical therapy: none     Goals:   SHORT TERM GOALS: 4 weeks 7/25/2022   1. Recent signs and systems trend is improving in order to progress towards LTG's. Not met     2. Patient will be independent with HEP in order to further progress and return to maximal function.  Not met    3. Pain rating at Worst: 5/10 in order to progress towards increased independence with activity.  Not met    4. Patient will be able to correct postural deviations in sitting and standing, to decrease pain and promote postural awareness for injury prevention.   Not met       LONG TERM GOALS: 8 weeks 7/25/2022   1. Patient will return to normal ADL, recreational, and work related activities with less pain and limitation.  Not met     2. Patient will  improve AROM to stated goals in order to return to maximal functional potential.   Not met    3. Patient will improve Strength to stated goals of appropriate musculature in order to improve functional independence.   Not met    4. Pain Rating at Best: 1/10 to improve Quality of Life.   Not met    5. Patient will meet predicted functional outcome (FOTO) score: 80% to increase self-worth & perceived functional ability.  Not met    6. Patient will have met/partially met personal goal of being able to lift objects with no pain  Not met           PLAN     Continue per PT plan of care.    Alondra Jose, PTA

## 2022-08-23 ENCOUNTER — CLINICAL SUPPORT (OUTPATIENT)
Dept: REHABILITATION | Facility: HOSPITAL | Age: 52
End: 2022-08-23
Attending: ORTHOPAEDIC SURGERY
Payer: COMMERCIAL

## 2022-08-23 DIAGNOSIS — M25.60 DECREASED RANGE OF MOTION: Primary | ICD-10-CM

## 2022-08-23 DIAGNOSIS — M62.89 MUSCLE TIGHTNESS: ICD-10-CM

## 2022-08-23 DIAGNOSIS — R53.1 DECREASED STRENGTH: ICD-10-CM

## 2022-08-23 PROCEDURE — 97112 NEUROMUSCULAR REEDUCATION: CPT | Mod: PN

## 2022-08-23 PROCEDURE — 97140 MANUAL THERAPY 1/> REGIONS: CPT | Mod: PN

## 2022-08-23 PROCEDURE — 97110 THERAPEUTIC EXERCISES: CPT | Mod: PN

## 2022-08-23 NOTE — PROGRESS NOTES
[facility]   Physical Therapy Treatment Note     Name: Hi Andres  Clinic Number: [id number]    Therapy Diagnosis:   Encounter Diagnoses   Name Primary?    Decreased range of motion Yes    Muscle tightness     Decreased strength      Physician: Parker Bernstein II, MD    Visit Date: 8/23/2022      Physician: Parker Bernstein II, MD   Physician Orders: PT Eval and Treat  Medical Diagnosis from Referral: M25.511,G89.29 (ICD-10-CM) - Chronic right shoulder pain   Evaluation Date: 7/25/2022  Authorization Period Expiration: 06/28/2023   Plan of Care Expiration: 10/30/2022                          Progress Update: 8/25/2022             FOTO: 1 / 3    Visit # / Visits authorized: 5 / 20                        PRECAUTIONS: Standard Precautions      PTA Visit #: 1/5     Time In: 0830  Time Out: 0925  Total Billable Time: 55 minutes    SUBJECTIVE     Pt reports: that he feels like he is improving since starting therapy.  He was compliant with home exercise program.  Response to previous treatment: no issues   Functional change: ongoing    Pain: 2/10  Location: right shoulder      OBJECTIVE     Objective Measures updated at progress report unless specified.     Treatment     Hi received the treatments listed below:      therapeutic exercises to develop strength, endurance, ROM and posture for 39 minutes including:    UBE 3/3  Supine shoulder flexion with dowel 3#  3x10                                                                                                                                                                                                                                                                                                                                                                                                                                                                                                                                                                                Supine SA punches with 3# dowel  3x10  Sidelying ER 3#  3x10  Side lying abduction 3 x 10   Open books  2x10 B  Seated thoracic extension with towel roll  3x10  Upper trap stretch  3x30 secs Bilateral   CC Rows  10#  3x10  CC Shoulder extension 10#  3x10   CC SA punches  7#  3x10   CC Chops 10#  3x10 B  Doorway stretch  3x30 secs B  Wall wipes with towel  3x10    Neuro reeducation x 8 mins including:      IR/ER with red tb  3x10  Bilateral external rotation RTB 3 x 10   Scaption with red tb 3x10     Manual Therapy x 8 mins including:    IASTM to R upper trap  R GH A/P mobs 3x30 - NP  R GH caudal mobs 3x30 - NP      Patient Education and Home Exercises     Home Exercises Provided and Patient Education Provided     Education provided:   - HEP    Written Home Exercises Provided: Patient instructed to cont prior HEP. Exercises were reviewed and Hi was able to demonstrate them prior to the end of the session.  Hi demonstrated good  understanding of the education provided. See EMR under Patient Instructions for exercises provided during therapy sessions.     ASSESSMENT     Patient tolerated session well and continues to show improvements with tolerance and decreased pain. Still challenged with overhead strength with discomfort reported but progressing fairly well. Continue to progress.     Hi Is progressing well towards his goals.   Pt prognosis is Good.     Pt will continue to benefit from skilled outpatient physical therapy to address the deficits listed in the problem list box on initial evaluation, provide pt/family education and to maximize pt's level of independence in the home and community environment.     Pt's spiritual, cultural and educational needs considered and pt agreeable to plan of care and goals.     Anticipated barriers to physical therapy: none     Goals:   SHORT TERM GOALS: 4 weeks 7/25/2022   1. Recent signs and systems trend is improving in order  to progress towards LTG's. Not met     2. Patient will be independent with HEP in order to further progress and return to maximal function.  Not met    3. Pain rating at Worst: 5/10 in order to progress towards increased independence with activity.  Not met    4. Patient will be able to correct postural deviations in sitting and standing, to decrease pain and promote postural awareness for injury prevention.   Not met       LONG TERM GOALS: 8 weeks 7/25/2022   1. Patient will return to normal ADL, recreational, and work related activities with less pain and limitation.  Not met     2. Patient will improve AROM to stated goals in order to return to maximal functional potential.   Not met    3. Patient will improve Strength to stated goals of appropriate musculature in order to improve functional independence.   Not met    4. Pain Rating at Best: 1/10 to improve Quality of Life.   Not met    5. Patient will meet predicted functional outcome (FOTO) score: 80% to increase self-worth & perceived functional ability.  Not met    6. Patient will have met/partially met personal goal of being able to lift objects with no pain  Not met           PLAN     Continue per PT plan of care.    Mike Fernández, PT

## 2022-08-24 ENCOUNTER — DOCUMENTATION ONLY (OUTPATIENT)
Dept: REHABILITATION | Facility: HOSPITAL | Age: 52
End: 2022-08-24
Payer: COMMERCIAL

## 2022-08-24 NOTE — PROGRESS NOTES
PT/PTA met face to face to discuss pt's treatment plan and progress towards established goals. Pt will be seen by a physical therapist minimally every 6th visit or every 30 days.      Alondra Jose PTA

## 2022-08-30 ENCOUNTER — CLINICAL SUPPORT (OUTPATIENT)
Dept: REHABILITATION | Facility: HOSPITAL | Age: 52
End: 2022-08-30
Attending: ORTHOPAEDIC SURGERY
Payer: COMMERCIAL

## 2022-08-30 DIAGNOSIS — M25.60 DECREASED RANGE OF MOTION: Primary | ICD-10-CM

## 2022-08-30 DIAGNOSIS — R53.1 DECREASED STRENGTH: ICD-10-CM

## 2022-08-30 DIAGNOSIS — M62.89 MUSCLE TIGHTNESS: ICD-10-CM

## 2022-08-30 PROCEDURE — 97112 NEUROMUSCULAR REEDUCATION: CPT | Mod: PN,CQ

## 2022-08-30 PROCEDURE — 97110 THERAPEUTIC EXERCISES: CPT | Mod: PN,CQ

## 2022-08-30 NOTE — PROGRESS NOTES
[facility]   Physical Therapy Treatment Note     Name: Hi Andres  Clinic Number: [id number]    Therapy Diagnosis:   Encounter Diagnoses   Name Primary?    Decreased range of motion Yes    Muscle tightness     Decreased strength      Physician: Parker Bernstein II, MD    Visit Date: 8/30/2022      Physician: Parker Bernstein II, MD   Physician Orders: PT Eval and Treat  Medical Diagnosis from Referral: M25.511,G89.29 (ICD-10-CM) - Chronic right shoulder pain   Evaluation Date: 7/25/2022  Authorization Period Expiration: 06/28/2023   Plan of Care Expiration: 10/30/2022                          Progress Update: 8/25/2022               FOTO: 2 / 3    Visit # / Visits authorized: 6 / 20                        PRECAUTIONS: Standard Precautions      PTA Visit #: 1/5     Time In: 930  Time Out: 1025  Total Billable Time: 55 minutes    SUBJECTIVE     Pt reports: still some minor twinges with certain movements, but seeing improvement.   He was compliant with home exercise program.  Response to previous treatment: no issues   Functional change: ongoing    Pain: 2/10  Location: right shoulder      OBJECTIVE     Objective Measures updated at progress report unless specified.     Treatment     Hi received the treatments listed below:      therapeutic exercises to develop strength, endurance, ROM and posture for 52 minutes including:    UBE 3/3                                                                                                                                                                                                                                                                                                                                                                                                                                                                                                                                                               Supine SA  punches with 3# dowel  3x10  Sidelying ER 3#  3x10  Side lying abduction 1# 3 x 10   Open books  2x10 B  Seated thoracic extension with towel roll  3x10  Upper trap stretch  3x30 secs Bilateral   CC Rows  10#  3x10  CC Shoulder extension 10#  3x10   CC SA punches  10#  3x10   CC Chops 10#  3x10 B  Doorway stretch  3x30 secs B  Wall wipes with towel  3x10      Neuro reeducation x 8 mins including:      IR/ER with red tb  3x10  Bilateral external rotation RTB 3 x 10   Scaption with red tb 3x10     Manual Therapy x 0 mins including:    IASTM to R upper trap  R GH A/P mobs 3x30 - NP  R GH caudal mobs 3x30 - NP      Patient Education and Home Exercises     Home Exercises Provided and Patient Education Provided     Education provided:   - HOME EXERCISE PROGRAM, postural awareness     Written Home Exercises Provided: Patient instructed to cont prior HEP. Exercises were reviewed and Hi was able to demonstrate them prior to the end of the session.  Hi demonstrated good  understanding of the education provided. See EMR under Patient Instructions for exercises provided during therapy sessions.     ASSESSMENT     Patient tolerated session well with focus on right shoulder strength and challenged appropriately. Continues to be challenged with scaption with resistance with increase in discomfort. Continue to progress stability and strength in right shoulder.     Hi Is progressing well towards his goals.   Pt prognosis is Good.     Pt will continue to benefit from skilled outpatient physical therapy to address the deficits listed in the problem list box on initial evaluation, provide pt/family education and to maximize pt's level of independence in the home and community environment.     Pt's spiritual, cultural and educational needs considered and pt agreeable to plan of care and goals.     Anticipated barriers to physical therapy: none     Goals:   SHORT TERM GOALS: 4 weeks 7/25/2022   Recent signs and systems  trend is improving in order to progress towards LTG's. Not met     Patient will be independent with HEP in order to further progress and return to maximal function.  Not met    Pain rating at Worst: 5/10 in order to progress towards increased independence with activity.  Not met    Patient will be able to correct postural deviations in sitting and standing, to decrease pain and promote postural awareness for injury prevention.   Not met       LONG TERM GOALS: 8 weeks 7/25/2022   Patient will return to normal ADL, recreational, and work related activities with less pain and limitation.  Not met     Patient will improve AROM to stated goals in order to return to maximal functional potential.   Not met    Patient will improve Strength to stated goals of appropriate musculature in order to improve functional independence.   Not met    Pain Rating at Best: 1/10 to improve Quality of Life.   Not met    Patient will meet predicted functional outcome (FOTO) score: 80% to increase self-worth & perceived functional ability.  Not met    Patient will have met/partially met personal goal of being able to lift objects with no pain  Not met           PLAN     Continue per PT plan of care.    Alondra Jose, PTA

## 2022-09-12 ENCOUNTER — LAB VISIT (OUTPATIENT)
Dept: LAB | Facility: HOSPITAL | Age: 52
End: 2022-09-12
Attending: STUDENT IN AN ORGANIZED HEALTH CARE EDUCATION/TRAINING PROGRAM
Payer: COMMERCIAL

## 2022-09-12 DIAGNOSIS — Z00.00 ROUTINE GENERAL MEDICAL EXAMINATION AT A HEALTH CARE FACILITY: ICD-10-CM

## 2022-09-12 LAB
ALBUMIN SERPL BCP-MCNC: 3.6 G/DL (ref 3.5–5.2)
ALP SERPL-CCNC: 99 U/L (ref 55–135)
ALT SERPL W/O P-5'-P-CCNC: 23 U/L (ref 10–44)
ANION GAP SERPL CALC-SCNC: 11 MMOL/L (ref 8–16)
AST SERPL-CCNC: 21 U/L (ref 10–40)
BASOPHILS # BLD AUTO: 0.02 K/UL (ref 0–0.2)
BASOPHILS NFR BLD: 0.3 % (ref 0–1.9)
BILIRUB SERPL-MCNC: 0.6 MG/DL (ref 0.1–1)
BUN SERPL-MCNC: 14 MG/DL (ref 6–20)
CALCIUM SERPL-MCNC: 8.9 MG/DL (ref 8.7–10.5)
CHLORIDE SERPL-SCNC: 105 MMOL/L (ref 95–110)
CHOLEST SERPL-MCNC: 183 MG/DL (ref 120–199)
CHOLEST/HDLC SERPL: 5.4 {RATIO} (ref 2–5)
CO2 SERPL-SCNC: 25 MMOL/L (ref 23–29)
COMPLEXED PSA SERPL-MCNC: 0.51 NG/ML (ref 0–4)
CREAT SERPL-MCNC: 0.9 MG/DL (ref 0.5–1.4)
DIFFERENTIAL METHOD: ABNORMAL
EOSINOPHIL # BLD AUTO: 0.1 K/UL (ref 0–0.5)
EOSINOPHIL NFR BLD: 1.4 % (ref 0–8)
ERYTHROCYTE [DISTWIDTH] IN BLOOD BY AUTOMATED COUNT: 13.7 % (ref 11.5–14.5)
EST. GFR  (NO RACE VARIABLE): >60 ML/MIN/1.73 M^2
ESTIMATED AVG GLUCOSE: 108 MG/DL (ref 68–131)
GLUCOSE SERPL-MCNC: 88 MG/DL (ref 70–110)
HBA1C MFR BLD: 5.4 % (ref 4–5.6)
HCT VFR BLD AUTO: 35.3 % (ref 40–54)
HDLC SERPL-MCNC: 34 MG/DL (ref 40–75)
HDLC SERPL: 18.6 % (ref 20–50)
HGB BLD-MCNC: 11.4 G/DL (ref 14–18)
IMM GRANULOCYTES # BLD AUTO: 0.03 K/UL (ref 0–0.04)
IMM GRANULOCYTES NFR BLD AUTO: 0.5 % (ref 0–0.5)
LDLC SERPL CALC-MCNC: 119 MG/DL (ref 63–159)
LYMPHOCYTES # BLD AUTO: 1 K/UL (ref 1–4.8)
LYMPHOCYTES NFR BLD: 16.1 % (ref 18–48)
MCH RBC QN AUTO: 29.9 PG (ref 27–31)
MCHC RBC AUTO-ENTMCNC: 32.3 G/DL (ref 32–36)
MCV RBC AUTO: 93 FL (ref 82–98)
MONOCYTES # BLD AUTO: 0.6 K/UL (ref 0.3–1)
MONOCYTES NFR BLD: 9.2 % (ref 4–15)
NEUTROPHILS # BLD AUTO: 4.6 K/UL (ref 1.8–7.7)
NEUTROPHILS NFR BLD: 72.5 % (ref 38–73)
NONHDLC SERPL-MCNC: 149 MG/DL
NRBC BLD-RTO: 0 /100 WBC
PLATELET # BLD AUTO: 410 K/UL (ref 150–450)
PMV BLD AUTO: 9.2 FL (ref 9.2–12.9)
POTASSIUM SERPL-SCNC: 3.7 MMOL/L (ref 3.5–5.1)
PROT SERPL-MCNC: 7 G/DL (ref 6–8.4)
RBC # BLD AUTO: 3.81 M/UL (ref 4.6–6.2)
SODIUM SERPL-SCNC: 141 MMOL/L (ref 136–145)
TRIGL SERPL-MCNC: 150 MG/DL (ref 30–150)
TSH SERPL DL<=0.005 MIU/L-ACNC: 1.46 UIU/ML (ref 0.4–4)
WBC # BLD AUTO: 6.29 K/UL (ref 3.9–12.7)

## 2022-09-12 PROCEDURE — 83036 HEMOGLOBIN GLYCOSYLATED A1C: CPT | Performed by: STUDENT IN AN ORGANIZED HEALTH CARE EDUCATION/TRAINING PROGRAM

## 2022-09-12 PROCEDURE — 80053 COMPREHEN METABOLIC PANEL: CPT | Performed by: STUDENT IN AN ORGANIZED HEALTH CARE EDUCATION/TRAINING PROGRAM

## 2022-09-12 PROCEDURE — 84443 ASSAY THYROID STIM HORMONE: CPT | Performed by: STUDENT IN AN ORGANIZED HEALTH CARE EDUCATION/TRAINING PROGRAM

## 2022-09-12 PROCEDURE — 80061 LIPID PANEL: CPT | Performed by: STUDENT IN AN ORGANIZED HEALTH CARE EDUCATION/TRAINING PROGRAM

## 2022-09-12 PROCEDURE — 85025 COMPLETE CBC W/AUTO DIFF WBC: CPT | Performed by: STUDENT IN AN ORGANIZED HEALTH CARE EDUCATION/TRAINING PROGRAM

## 2022-09-12 PROCEDURE — 84153 ASSAY OF PSA TOTAL: CPT | Performed by: STUDENT IN AN ORGANIZED HEALTH CARE EDUCATION/TRAINING PROGRAM

## 2022-09-12 PROCEDURE — 36415 COLL VENOUS BLD VENIPUNCTURE: CPT | Mod: PO | Performed by: STUDENT IN AN ORGANIZED HEALTH CARE EDUCATION/TRAINING PROGRAM

## 2022-09-13 ENCOUNTER — CLINICAL SUPPORT (OUTPATIENT)
Dept: REHABILITATION | Facility: HOSPITAL | Age: 52
End: 2022-09-13
Attending: ORTHOPAEDIC SURGERY
Payer: COMMERCIAL

## 2022-09-13 DIAGNOSIS — M62.89 MUSCLE TIGHTNESS: ICD-10-CM

## 2022-09-13 DIAGNOSIS — M25.60 DECREASED RANGE OF MOTION: Primary | ICD-10-CM

## 2022-09-13 DIAGNOSIS — R53.1 DECREASED STRENGTH: ICD-10-CM

## 2022-09-13 PROCEDURE — 97112 NEUROMUSCULAR REEDUCATION: CPT | Mod: PN,CQ

## 2022-09-13 PROCEDURE — 97110 THERAPEUTIC EXERCISES: CPT | Mod: PN,CQ

## 2022-09-13 NOTE — PROGRESS NOTES
[facility]   Physical Therapy Treatment Note     Name: Hi Andres  Clinic Number: [id number]    Therapy Diagnosis:   Encounter Diagnoses   Name Primary?    Decreased range of motion Yes    Muscle tightness     Decreased strength      Physician: Parker Bernstein II, MD    Visit Date: 9/13/2022      Physician: Parker Bernstein II, MD   Physician Orders: PT Eval and Treat  Medical Diagnosis from Referral: M25.511,G89.29 (ICD-10-CM) - Chronic right shoulder pain   Evaluation Date: 7/25/2022  Authorization Period Expiration: 06/28/2023   Plan of Care Expiration: 10/30/2022                          Progress Update: 8/25/2022               FOTO: 2 / 3    Visit # / Visits authorized: 7 / 20                        PRECAUTIONS: Standard Precautions      PTA Visit #: 2/5     Time In: 930  Time Out: 1023  Total Billable Time: 53 minutes    SUBJECTIVE     Pt reports: feeling the discomfort when reaching across his body.   He was compliant with home exercise program.  Response to previous treatment: no issues   Functional change: ongoing    Pain: 2/10  Location: right shoulder      OBJECTIVE     Objective Measures updated at progress report unless specified.     Treatment     Hi received the treatments listed below:      therapeutic exercises to develop strength, endurance, ROM and posture for 45 minutes including:    UBE 3/3                                                                                                                                                                                                                                                                                                                                                                                                                                                                                                                                                               Supine SA punches with 3#  dowel  3x10  Supine horizontal abduction with Green loop Theraband with overhead flexion 3 x 10   Sidelying ER 3#  3x10  Side lying abduction 2# 3 x 10   Open books  2x10 B  CC Rows  10#  3x10  CC Shoulder extension 10#  3x10   CC right shoulder adduction  10#  3x10   Doorway stretch  3x30 secs B      Neuro reeducation x 8 mins including:      IR/ER with green tb  3x10  PNF D2 flexion Red Theraband x 15   Scaption Red Theraband 3 x 10   Serratus slides with Red Theraband 2 x 10     Manual Therapy x 0 mins including:    IASTM to R upper trap  R GH A/P mobs 3x30 - NP  R GH caudal mobs 3x30 - NP      Patient Education and Home Exercises     Home Exercises Provided and Patient Education Provided     Education provided:   - HOME EXERCISE PROGRAM, postural awareness     Written Home Exercises Provided: Patient instructed to cont prior HEP. Exercises were reviewed and Hi was able to demonstrate them prior to the end of the session.  Hi demonstrated good  understanding of the education provided. See EMR under Patient Instructions for exercises provided during therapy sessions.     ASSESSMENT     Patient tolerated session well with focus on right shoulder strength and stability. Challenged with added PNF D2 flexion with theraband and added resistance on External Rotation and Internal Rotation. Continue to progress as tolerated to decrease discomfort with horizontal adduction.     Hi Is progressing well towards his goals.   Pt prognosis is Good.     Pt will continue to benefit from skilled outpatient physical therapy to address the deficits listed in the problem list box on initial evaluation, provide pt/family education and to maximize pt's level of independence in the home and community environment.     Pt's spiritual, cultural and educational needs considered and pt agreeable to plan of care and goals.     Anticipated barriers to physical therapy: none     Goals:   SHORT TERM GOALS: 4 weeks 7/25/2022    Recent signs and systems trend is improving in order to progress towards LTG's. Not met     Patient will be independent with HEP in order to further progress and return to maximal function.  Not met    Pain rating at Worst: 5/10 in order to progress towards increased independence with activity.  Not met    Patient will be able to correct postural deviations in sitting and standing, to decrease pain and promote postural awareness for injury prevention.   Not met       LONG TERM GOALS: 8 weeks 7/25/2022   Patient will return to normal ADL, recreational, and work related activities with less pain and limitation.  Not met     Patient will improve AROM to stated goals in order to return to maximal functional potential.   Not met    Patient will improve Strength to stated goals of appropriate musculature in order to improve functional independence.   Not met    Pain Rating at Best: 1/10 to improve Quality of Life.   Not met    Patient will meet predicted functional outcome (FOTO) score: 80% to increase self-worth & perceived functional ability.  Not met    Patient will have met/partially met personal goal of being able to lift objects with no pain  Not met           PLAN     Continue per PT plan of care.    Alondra Jose, PTA

## 2022-09-15 ENCOUNTER — CLINICAL SUPPORT (OUTPATIENT)
Dept: REHABILITATION | Facility: HOSPITAL | Age: 52
End: 2022-09-15
Attending: ORTHOPAEDIC SURGERY
Payer: COMMERCIAL

## 2022-09-15 DIAGNOSIS — M25.60 DECREASED RANGE OF MOTION: Primary | ICD-10-CM

## 2022-09-15 DIAGNOSIS — M62.89 MUSCLE TIGHTNESS: ICD-10-CM

## 2022-09-15 DIAGNOSIS — R53.1 DECREASED STRENGTH: ICD-10-CM

## 2022-09-15 PROCEDURE — 97112 NEUROMUSCULAR REEDUCATION: CPT | Mod: PN

## 2022-09-15 PROCEDURE — 97140 MANUAL THERAPY 1/> REGIONS: CPT | Mod: PN

## 2022-09-15 PROCEDURE — 97110 THERAPEUTIC EXERCISES: CPT | Mod: PN

## 2022-09-15 NOTE — PROGRESS NOTES
[facility]   Physical Therapy Treatment and Discharge Note     Name: Hi Priesttiste  Clinic Number: [id number]    Therapy Diagnosis:   Encounter Diagnoses   Name Primary?    Decreased range of motion Yes    Muscle tightness     Decreased strength        Physician: Parker Bernstein II, MD    Visit Date: 9/15/2022      Physician: Parker Bernstein II, MD   Physician Orders: PT Eval and Treat  Medical Diagnosis from Referral: M25.511,G89.29 (ICD-10-CM) - Chronic right shoulder pain   Evaluation Date: 7/25/2022  Authorization Period Expiration: 06/28/2023   Plan of Care Expiration: 10/30/2022                          Progress Update: 8/25/2022               FOTO: 2 / 3    Visit # / Visits authorized: 8 / 20                        PRECAUTIONS: Standard Precautions      PTA Visit #: 2/5     Time In: 0857  (Pnt arrived early)  Time Out: 0952  Total Billable Time: 55 minutes    SUBJECTIVE     Pt reports: that he is feeling great today, no complaints.  States that he is confident he can perform exercises at home.  He was compliant with home exercise program.  Response to previous treatment: no issues   Functional change: ongoing    Pain: 2/10  Location: right shoulder      OBJECTIVE     Objective Measures updated at progress report unless specified.     Treatment     Hi received the treatments listed below:      therapeutic exercises to develop strength, endurance, ROM and posture for 39 minutes including:  During therapeutic exercise, Hi, was assisted by a rehabilitation technician under the direction of the treating therapist.     ALMAS 4/4                                                                                                                                                                                                                                                                                                                                                                                                                                                                                                                                                                Supine SA punches with 3# dowel  3x10  Supine flexion with dowel 3#  3x10  Sidelying ER 3#  3x10  Side lying abduction 2# 3 x 10   Open books  2x10 B  CC Rows  10#  3x10  CC Shoulder extension 10#  3x10   CC right shoulder adduction  10#  3x10   Doorway stretch  3x30 secs B  CC Chops 10#  2x10 B    Neuro reeducation x 8 mins including:      IR/ER with green tb  3x10  PNF D2 flexion Red Theraband x 15   Scaption Red Theraband 3 x 10   Serratus slides with Red Theraband 2 x 10     Manual Therapy x 8 mins including:    IASTM to R upper trap  R GH A/P mobs 3x30 - NP  R GH caudal mobs 3x30 - NP      Patient Education and Home Exercises     Home Exercises Provided and Patient Education Provided     Education provided:   - HOME EXERCISE PROGRAM, postural awareness     Written Home Exercises Provided: Patient instructed to cont prior HEP. Exercises were reviewed and Hi was able to demonstrate them prior to the end of the session.  Hi demonstrated good  understanding of the education provided. See EMR under Patient Instructions for exercises provided during therapy sessions.     ASSESSMENT     Patient tolerated session well with focus on right shoulder strength and stability. Challenged with added PNF D2 flexion with theraband and added resistance on External Rotation and Internal Rotation. Continue to progress as tolerated to decrease discomfort with horizontal adduction.     Hi Is progressing well towards his goals.   Pt prognosis is Good.     Pt will be discharged from physical therapy and will continue therex as HEP.    Pt's spiritual, cultural and educational needs considered and pt agreeable to plan of care and goals.     Anticipated barriers to physical therapy: none     Goals:   SHORT TERM GOALS: 4 weeks  7/25/2022   Recent signs and systems trend is improving in order to progress towards LTG's. Not met     Patient will be independent with HEP in order to further progress and return to maximal function.  Not met    Pain rating at Worst: 5/10 in order to progress towards increased independence with activity.  Not met    Patient will be able to correct postural deviations in sitting and standing, to decrease pain and promote postural awareness for injury prevention.   Not met       LONG TERM GOALS: 8 weeks 7/25/2022   Patient will return to normal ADL, recreational, and work related activities with less pain and limitation.  Not met     Patient will improve AROM to stated goals in order to return to maximal functional potential.   Not met    Patient will improve Strength to stated goals of appropriate musculature in order to improve functional independence.   Not met    Pain Rating at Best: 1/10 to improve Quality of Life.   Not met    Patient will meet predicted functional outcome (FOTO) score: 80% to increase self-worth & perceived functional ability.  Not met    Patient will have met/partially met personal goal of being able to lift objects with no pain  Not met           PLAN     Pnt is being discharged from physical therapy.    Mike Fernández, PT

## 2022-12-05 ENCOUNTER — OFFICE VISIT (OUTPATIENT)
Dept: FAMILY MEDICINE | Facility: CLINIC | Age: 52
End: 2022-12-05
Payer: COMMERCIAL

## 2022-12-05 VITALS
SYSTOLIC BLOOD PRESSURE: 120 MMHG | HEART RATE: 83 BPM | TEMPERATURE: 99 F | DIASTOLIC BLOOD PRESSURE: 70 MMHG | BODY MASS INDEX: 48.53 KG/M2 | WEIGHT: 291.25 LBS | HEIGHT: 65 IN | OXYGEN SATURATION: 99 %

## 2022-12-05 DIAGNOSIS — M79.604 PAIN IN BOTH LOWER EXTREMITIES: Primary | ICD-10-CM

## 2022-12-05 DIAGNOSIS — Z23 NEED FOR INFLUENZA VACCINATION: ICD-10-CM

## 2022-12-05 DIAGNOSIS — M79.605 PAIN IN BOTH LOWER EXTREMITIES: Primary | ICD-10-CM

## 2022-12-05 PROCEDURE — 3078F PR MOST RECENT DIASTOLIC BLOOD PRESSURE < 80 MM HG: ICD-10-PCS | Mod: CPTII,S$GLB,, | Performed by: NURSE PRACTITIONER

## 2022-12-05 PROCEDURE — 99213 OFFICE O/P EST LOW 20 MIN: CPT | Mod: 25,S$GLB,, | Performed by: NURSE PRACTITIONER

## 2022-12-05 PROCEDURE — 90686 FLU VACCINE (QUAD) GREATER THAN OR EQUAL TO 3YO PRESERVATIVE FREE IM: ICD-10-PCS | Mod: S$GLB,,, | Performed by: NURSE PRACTITIONER

## 2022-12-05 PROCEDURE — 90471 FLU VACCINE (QUAD) GREATER THAN OR EQUAL TO 3YO PRESERVATIVE FREE IM: ICD-10-PCS | Mod: S$GLB,,, | Performed by: NURSE PRACTITIONER

## 2022-12-05 PROCEDURE — 99999 PR PBB SHADOW E&M-EST. PATIENT-LVL V: CPT | Mod: PBBFAC,,, | Performed by: NURSE PRACTITIONER

## 2022-12-05 PROCEDURE — 99213 PR OFFICE/OUTPT VISIT, EST, LEVL III, 20-29 MIN: ICD-10-PCS | Mod: 25,S$GLB,, | Performed by: NURSE PRACTITIONER

## 2022-12-05 PROCEDURE — 3008F PR BODY MASS INDEX (BMI) DOCUMENTED: ICD-10-PCS | Mod: CPTII,S$GLB,, | Performed by: NURSE PRACTITIONER

## 2022-12-05 PROCEDURE — 90471 IMMUNIZATION ADMIN: CPT | Mod: S$GLB,,, | Performed by: NURSE PRACTITIONER

## 2022-12-05 PROCEDURE — 1159F MED LIST DOCD IN RCRD: CPT | Mod: CPTII,S$GLB,, | Performed by: NURSE PRACTITIONER

## 2022-12-05 PROCEDURE — 1159F PR MEDICATION LIST DOCUMENTED IN MEDICAL RECORD: ICD-10-PCS | Mod: CPTII,S$GLB,, | Performed by: NURSE PRACTITIONER

## 2022-12-05 PROCEDURE — 99999 PR PBB SHADOW E&M-EST. PATIENT-LVL V: ICD-10-PCS | Mod: PBBFAC,,, | Performed by: NURSE PRACTITIONER

## 2022-12-05 PROCEDURE — 4010F ACE/ARB THERAPY RXD/TAKEN: CPT | Mod: CPTII,S$GLB,, | Performed by: NURSE PRACTITIONER

## 2022-12-05 PROCEDURE — 3008F BODY MASS INDEX DOCD: CPT | Mod: CPTII,S$GLB,, | Performed by: NURSE PRACTITIONER

## 2022-12-05 PROCEDURE — 90686 IIV4 VACC NO PRSV 0.5 ML IM: CPT | Mod: S$GLB,,, | Performed by: NURSE PRACTITIONER

## 2022-12-05 PROCEDURE — 3044F PR MOST RECENT HEMOGLOBIN A1C LEVEL <7.0%: ICD-10-PCS | Mod: CPTII,S$GLB,, | Performed by: NURSE PRACTITIONER

## 2022-12-05 PROCEDURE — 1160F RVW MEDS BY RX/DR IN RCRD: CPT | Mod: CPTII,S$GLB,, | Performed by: NURSE PRACTITIONER

## 2022-12-05 PROCEDURE — 4010F PR ACE/ARB THEARPY RXD/TAKEN: ICD-10-PCS | Mod: CPTII,S$GLB,, | Performed by: NURSE PRACTITIONER

## 2022-12-05 PROCEDURE — 3074F SYST BP LT 130 MM HG: CPT | Mod: CPTII,S$GLB,, | Performed by: NURSE PRACTITIONER

## 2022-12-05 PROCEDURE — 3074F PR MOST RECENT SYSTOLIC BLOOD PRESSURE < 130 MM HG: ICD-10-PCS | Mod: CPTII,S$GLB,, | Performed by: NURSE PRACTITIONER

## 2022-12-05 PROCEDURE — 3078F DIAST BP <80 MM HG: CPT | Mod: CPTII,S$GLB,, | Performed by: NURSE PRACTITIONER

## 2022-12-05 PROCEDURE — 3044F HG A1C LEVEL LT 7.0%: CPT | Mod: CPTII,S$GLB,, | Performed by: NURSE PRACTITIONER

## 2022-12-05 PROCEDURE — 1160F PR REVIEW ALL MEDS BY PRESCRIBER/CLIN PHARMACIST DOCUMENTED: ICD-10-PCS | Mod: CPTII,S$GLB,, | Performed by: NURSE PRACTITIONER

## 2022-12-05 NOTE — PROGRESS NOTES
Subjective:       Patient ID: Hi Andres is a 52 y.o. male.    Chief Complaint: bilateral leg edema  and Leg Pain     HPI   51 y/o male patient with medical problems listed below presents for b/l lower extremities pain (right >left) and swelling for several years. This is chronic problem but states symptoms are getting progressive. Denies recent trauma or recent travel. Patient states is followed by dermatology Dr. Neal for discoloration in b/l lower extremities and provided topical steroid cream. Denies smoking. Patient states pain is worse on walking but denies chest pain, sob, nausea, abdominal pain, fever, chills, generalized body ache or weakness.     Labs reviewed 9/2022  US Lower extremity veins bilateral insufficiency 6/15/2021  Impression:   Doppler ultrasound evidence of valvular insufficiency and venous reflux in the left greater saphenous vein.    US Lower extrem arteries bilat with ANA 6/15/2021  Impression:   No Doppler ultrasound evidence of peripheral vascular disease, stenosis or occlusion in either leg.    Patient Active Problem List   Diagnosis    Hypertension    Hyperlipidemia    Sleep disorder    Class 3 severe obesity with serious comorbidity and body mass index (BMI) of 50.0 to 59.9 in adult    ED (erectile dysfunction) of organic origin    Vitamin D deficiency    Osteoarthritis    Iron deficiency anemia    Gout    EVGENY on CPAP    Screening for colon cancer    Decreased range of motion    Muscle tightness    Decreased strength      Review of patient's allergies indicates:  No Known Allergies    Past Surgical History:   Procedure Laterality Date    COLONOSCOPY N/A 7/23/2021    Procedure: COLONOSCOPY;  Surgeon: Emily Valente MD;  Location: [facility];  Service: Endoscopy;  Laterality: N/A;    HERNIA REPAIR          Current Outpatient Medications:     allopurinoL (ZYLOPRIM) 300 MG tablet, TAKE 1 TABLET DAILY, Disp: 90 tablet, Rfl: 3    amLODIPine (NORVASC) 10 MG tablet, , Disp: ,  Rfl:     ascorbic acid, vitamin C, (VITAMIN C) 1000 MG tablet, Take 1,000 mg by mouth once daily., Disp: , Rfl:     aspirin (ECOTRIN) 81 MG EC tablet, Take 81 mg by mouth once daily., Disp: , Rfl:     ciclopirox (PENLAC) 8 % Soln, ?Apply nail lacquer 8% solution once daily to affected nails with applicator brush, preferably at bedtime or 8 hours before washing; remove with alcohol every 7 days, Disp: 6.6 mL, Rfl: 2    ergocalciferol (ERGOCALCIFEROL) 50,000 unit Cap, TAKE 1 CAPSULE EVERY 7 DAYS, Disp: 12 capsule, Rfl: 3    hydroCHLOROthiazide (HYDRODIURIL) 12.5 MG Tab, Take 1 tablet (12.5 mg total) by mouth once daily., Disp: 90 tablet, Rfl: 3    metoprolol succinate (TOPROL-XL) 100 MG 24 hr tablet, TAKE 1 TABLET DAILY, Disp: 90 tablet, Rfl: 3    multivitamin (MULTIPLE VITAMINS ORAL), Take 1 tablet by mouth once daily., Disp: , Rfl:     omega-3 fatty acids-fish oil 435-880 mg Cap, Take 1 capsule by mouth once daily., Disp: , Rfl:     orlistat (ROMAN) 60 MG capsule, Take 1 capsule (60 mg total) by mouth 3 (three) times daily with meals., Disp: 90 capsule, Rfl: 11    tadalafiL (CIALIS) 5 MG tablet, Take 1 tablet by mouth once daily, Disp: 15 tablet, Rfl: 0    valsartan (DIOVAN) 320 MG tablet, TAKE 1 TABLET DAILY WITH FOOD, Disp: 90 tablet, Rfl: 3    Lab Results   Component Value Date    WBC 6.29 09/12/2022    HGB 11.4 (L) 09/12/2022    HCT 35.3 (L) 09/12/2022     09/12/2022    CHOL 183 09/12/2022    TRIG 150 09/12/2022    HDL 34 (L) 09/12/2022    ALT 23 09/12/2022    AST 21 09/12/2022     09/12/2022    K 3.7 09/12/2022     09/12/2022    CREATININE 0.9 09/12/2022    BUN 14 09/12/2022    CO2 25 09/12/2022    TSH 1.455 09/12/2022    PSA 0.51 09/12/2022    HGBA1C 5.4 09/12/2022     Review of Systems   Constitutional:  Negative for chills and fever.   Respiratory:  Negative for cough, chest tightness and shortness of breath.    Cardiovascular:  Negative for chest pain and palpitations.   Gastrointestinal:   "Negative for abdominal pain.   Musculoskeletal:  Positive for gait problem.   Skin:  Positive for color change.   Neurological:  Negative for dizziness and headaches.       Objective:   /70 (BP Location: Right arm, Patient Position: Sitting, BP Method: Large (Manual))   Pulse 83   Temp 98.9 °F (37.2 °C) (Oral)   Ht 5' 5" (1.651 m)   Wt 132.1 kg (291 lb 3.6 oz)   SpO2 99%   BMI 48.46 kg/m²         Physical Exam  Vitals reviewed.   Constitutional:       General: He is not in acute distress.     Appearance: Normal appearance.   Cardiovascular:      Rate and Rhythm: Normal rate and regular rhythm.      Pulses: Normal pulses.      Heart sounds: Normal heart sounds.   Pulmonary:      Effort: Pulmonary effort is normal.      Breath sounds: Normal breath sounds.   Chest:      Chest wall: No deformity, swelling or edema.   Abdominal:      General: Abdomen is flat. Bowel sounds are normal.      Palpations: Abdomen is soft.   Musculoskeletal:         General: Swelling and tenderness present.      Cervical back: Normal range of motion.      Right lower leg: Edema present.      Left lower leg: Edema present.   Skin:     General: Skin is warm and dry.      Comments: +dark discoloration in b/l shins    Neurological:      General: No focal deficit present.      Mental Status: He is alert.   Psychiatric:         Mood and Affect: Mood normal.         Behavior: Behavior normal.       Assessment:       1. Pain in both lower extremities    2. Need for influenza vaccination        Plan:       1. Pain in both lower extremities  - US Lower Extrem Arteries Bilat with ANA (xpd); Future  - US Lower Extremity Veins Bilateral; Future  - Ambulatory referral/consult to Vascular Surgery; Future    2. Need for influenza vaccination  - Influenza - Quadrivalent *Preferred* (6 months+) (PF)     Patient with be reevaluated in  as scheduled   or sooner tc Pathak NP      "

## 2022-12-09 ENCOUNTER — OFFICE VISIT (OUTPATIENT)
Dept: CARDIOLOGY | Facility: CLINIC | Age: 52
End: 2022-12-09
Payer: COMMERCIAL

## 2022-12-09 VITALS
HEIGHT: 66 IN | BODY MASS INDEX: 46.77 KG/M2 | HEART RATE: 80 BPM | DIASTOLIC BLOOD PRESSURE: 82 MMHG | WEIGHT: 291 LBS | SYSTOLIC BLOOD PRESSURE: 144 MMHG

## 2022-12-09 DIAGNOSIS — E66.01 MORBID OBESITY: ICD-10-CM

## 2022-12-09 DIAGNOSIS — I87.2 VENOUS STASIS DERMATITIS OF BOTH LOWER EXTREMITIES: ICD-10-CM

## 2022-12-09 DIAGNOSIS — R60.0 EDEMA OF BOTH LOWER EXTREMITIES: ICD-10-CM

## 2022-12-09 DIAGNOSIS — M79.661 PAIN IN BOTH LOWER LEGS: Primary | ICD-10-CM

## 2022-12-09 DIAGNOSIS — M79.3 LIPODERMATOSCLEROSIS OF BOTH LOWER EXTREMITIES: ICD-10-CM

## 2022-12-09 DIAGNOSIS — M79.662 PAIN IN BOTH LOWER LEGS: Primary | ICD-10-CM

## 2022-12-09 DIAGNOSIS — I73.9 CLAUDICATION OF BOTH LOWER EXTREMITIES: ICD-10-CM

## 2022-12-09 DIAGNOSIS — I89.0 LYMPHEDEMA OF BOTH LOWER EXTREMITIES: ICD-10-CM

## 2022-12-09 PROCEDURE — 3008F BODY MASS INDEX DOCD: CPT | Mod: CPTII,S$GLB,, | Performed by: INTERNAL MEDICINE

## 2022-12-09 PROCEDURE — 1159F PR MEDICATION LIST DOCUMENTED IN MEDICAL RECORD: ICD-10-PCS | Mod: CPTII,S$GLB,, | Performed by: INTERNAL MEDICINE

## 2022-12-09 PROCEDURE — 3077F PR MOST RECENT SYSTOLIC BLOOD PRESSURE >= 140 MM HG: ICD-10-PCS | Mod: CPTII,S$GLB,, | Performed by: INTERNAL MEDICINE

## 2022-12-09 PROCEDURE — 99205 OFFICE O/P NEW HI 60 MIN: CPT | Mod: S$GLB,,, | Performed by: INTERNAL MEDICINE

## 2022-12-09 PROCEDURE — 3044F HG A1C LEVEL LT 7.0%: CPT | Mod: CPTII,S$GLB,, | Performed by: INTERNAL MEDICINE

## 2022-12-09 PROCEDURE — 3079F PR MOST RECENT DIASTOLIC BLOOD PRESSURE 80-89 MM HG: ICD-10-PCS | Mod: CPTII,S$GLB,, | Performed by: INTERNAL MEDICINE

## 2022-12-09 PROCEDURE — 3079F DIAST BP 80-89 MM HG: CPT | Mod: CPTII,S$GLB,, | Performed by: INTERNAL MEDICINE

## 2022-12-09 PROCEDURE — 99999 PR PBB SHADOW E&M-EST. PATIENT-LVL V: CPT | Mod: PBBFAC,,, | Performed by: INTERNAL MEDICINE

## 2022-12-09 PROCEDURE — 99205 PR OFFICE/OUTPT VISIT, NEW, LEVL V, 60-74 MIN: ICD-10-PCS | Mod: S$GLB,,, | Performed by: INTERNAL MEDICINE

## 2022-12-09 PROCEDURE — 3044F PR MOST RECENT HEMOGLOBIN A1C LEVEL <7.0%: ICD-10-PCS | Mod: CPTII,S$GLB,, | Performed by: INTERNAL MEDICINE

## 2022-12-09 PROCEDURE — 3077F SYST BP >= 140 MM HG: CPT | Mod: CPTII,S$GLB,, | Performed by: INTERNAL MEDICINE

## 2022-12-09 PROCEDURE — 4010F ACE/ARB THERAPY RXD/TAKEN: CPT | Mod: CPTII,S$GLB,, | Performed by: INTERNAL MEDICINE

## 2022-12-09 PROCEDURE — 99999 PR PBB SHADOW E&M-EST. PATIENT-LVL V: ICD-10-PCS | Mod: PBBFAC,,, | Performed by: INTERNAL MEDICINE

## 2022-12-09 PROCEDURE — 1159F MED LIST DOCD IN RCRD: CPT | Mod: CPTII,S$GLB,, | Performed by: INTERNAL MEDICINE

## 2022-12-09 PROCEDURE — 4010F PR ACE/ARB THEARPY RXD/TAKEN: ICD-10-PCS | Mod: CPTII,S$GLB,, | Performed by: INTERNAL MEDICINE

## 2022-12-09 PROCEDURE — 3008F PR BODY MASS INDEX (BMI) DOCUMENTED: ICD-10-PCS | Mod: CPTII,S$GLB,, | Performed by: INTERNAL MEDICINE

## 2022-12-09 RX ORDER — TRIAMCINOLONE ACETONIDE 1 MG/G
OINTMENT TOPICAL
COMMUNITY

## 2022-12-09 RX ORDER — AMMONIUM LACTATE 12 G/100G
LOTION TOPICAL
COMMUNITY

## 2022-12-09 RX ORDER — METHYLPREDNISOLONE 4 MG/1
4 TABLET ORAL DAILY
COMMUNITY

## 2022-12-09 RX ORDER — MOMETASONE FUROATE 1 MG/G
OINTMENT TOPICAL DAILY
COMMUNITY
End: 2023-12-13

## 2022-12-09 NOTE — PATIENT INSTRUCTIONS
Assessment/Plan:  Hi Andres is a 52 y.o. male with HTN, HLD, EVGENY (on CPAP), morbid obesity, who presents for an initial appointment.    Bilateral Leg Pain- Etiology likely multifactorial with contribution from chronic venous insufficiency with lipodermatosclerosis and possible PAD.  Check BLE venous reflux study , exercise ANA study, BLE arterial ultrasound, and echo.  Refer to lymphedema clinic.  Pt to limit sodium intake to 2,000 mg daily.  Limit volume intake to 1.5 liters daily.  Elevate legs when resting.  Wear graduated compression hose.     2. HTN- Continue current medications.    3. HLD- Check lipids to determine ASCVD risk score.    4. Morbid Obesity- Refer to Bariatric Medicine for evaluation.    Follow up in 4 months

## 2022-12-09 NOTE — PROGRESS NOTES
[facility]      Chief Complaint   Patient presents with    [facility]    Claudication       Patient ID: Hi Andres is a 52 y.o. male with HTN, HLD, EVGENY (on CPAP), morbid obesity, who presents for an initial appointment.  Pertinent history/events are as follows:     -Pt kindly referred by Dariela Rivera NP for evaluation of Pain in both lower extremities.    HPI:  Mr. Andres reports pain in both legs which started 2 weeks ago.  Pain occurs at rest and with ambulation.  He reports no LE wounds/ulcers.  Exam shows BLE's with trace to 1 pitting edema, venous stasis dermatitis, lipodermatosclerosis, and changes consistent with lymphedema.      Past Medical History:   Diagnosis Date    Anemia     Arthritis     Broken foot     Left side (fell down a ladder)    Gout     Hernia, abdominal     Hyperlipidemia     Hypertension     EVGENY on CPAP     Vitamin D deficiency      Past Surgical History:   Procedure Laterality Date    COLONOSCOPY N/A 7/23/2021    Procedure: COLONOSCOPY;  Surgeon: Emily Valente MD;  Location: [facility];  Service: Endoscopy;  Laterality: N/A;    HERNIA REPAIR       Social History     Socioeconomic History    Marital status:    Tobacco Use    Smoking status: Never    Smokeless tobacco: Never   Substance and Sexual Activity    Alcohol use: Yes     Comment: monthly    Drug use: No    Sexual activity: Yes     Partners: Female   Social History Narrative    Live with wife     Family History   Problem Relation Age of Onset    Arthritis Father     Hypertension Father     Diabetes Father     No Known Problems Mother     Prostate cancer Neg Hx     Colon cancer Neg Hx        Review of patient's allergies indicates:  No Known Allergies    Medication List with Changes/Refills   Current Medications    ALLOPURINOL (ZYLOPRIM) 300 MG TABLET    TAKE 1 TABLET DAILY    AMLODIPINE (NORVASC) 10 MG TABLET        AMMONIUM LACTATE (LAC-HYDRIN) 12 % LOTION    Apply topically as needed for Dry  "Skin.    ASCORBIC ACID, VITAMIN C, (VITAMIN C) 1000 MG TABLET    Take 1,000 mg by mouth once daily.    ASPIRIN (ECOTRIN) 81 MG EC TABLET    Take 81 mg by mouth once daily.    CICLOPIROX (PENLAC) 8 % SOLN    ?Apply nail lacquer 8% solution once daily to affected nails with applicator brush, preferably at bedtime or 8 hours before washing; remove with alcohol every 7 days    ERGOCALCIFEROL (ERGOCALCIFEROL) 50,000 UNIT CAP    TAKE 1 CAPSULE EVERY 7 DAYS    HYDROCHLOROTHIAZIDE (HYDRODIURIL) 12.5 MG TAB    Take 1 tablet (12.5 mg total) by mouth once daily.    METHYLPREDNISOLONE (MEDROL) 4 MG TAB    Take 4 mg by mouth once daily.    METOPROLOL SUCCINATE (TOPROL-XL) 100 MG 24 HR TABLET    TAKE 1 TABLET DAILY    MOMETASONE (ELOCON) 0.1 % OINTMENT    Apply topically once daily.    MULTIVITAMIN (MULTIPLE VITAMINS ORAL)    Take 1 tablet by mouth once daily.    OMEGA-3 FATTY ACIDS-FISH -880 MG CAP    Take 1 capsule by mouth once daily.    ORLISTAT (ROMAN) 60 MG CAPSULE    Take 1 capsule (60 mg total) by mouth 3 (three) times daily with meals.    TADALAFIL (CIALIS) 5 MG TABLET    Take 1 tablet by mouth once daily    TRIAMCINOLONE ACETONIDE 0.1% (KENALOG) 0.1 % OINTMENT    Apply topically 2 (two) times daily.    VALSARTAN (DIOVAN) 320 MG TABLET    TAKE 1 TABLET DAILY WITH FOOD       Review of Systems  Constitution: Denies chills, fever, and sweats.  HENT: Denies headaches or blurry vision.  Cardiovascular: Denies chest pain or irregular heart beat.  Respiratory: Denies cough or shortness of breath.  Gastrointestinal: Denies abdominal pain, nausea, or vomiting.  Musculoskeletal: Denies muscle cramps.  Neurological: Denies dizziness or focal weakness.  Psychiatric/Behavioral: Normal mental status.  Hematologic/Lymphatic: Denies bleeding problem or easy bruising/bleeding.  Skin: Denies rash or suspicious lesions    Physical Examination  BP (!) 144/82   Pulse 80   Ht 5' 6" (1.676 m)   Wt 132 kg (291 lb 0.1 oz)   BMI 46.97 " kg/m²     Constitutional: No acute distress, conversant  HEENT: Sclera anicteric, Pupils equal, round and reactive to light, extraocular motions intact, Oropharynx clear  Neck: No JVD, no carotid bruits  Cardiovascular: regular rate and rhythm, no murmur, rubs or gallops, normal S1/S2  Pulmonary: Clear to auscultation bilaterally  Abdominal: Abdomen soft, nontender, nondistended, positive bowel sounds  Extremities: BLE's with trace to 1 pitting edema, venous stasis dermatitis, lipodermatosclerosis, and changes consistent with lymphedema    Pulses:  Carotid pulses are 2+ on the right side, and 2+ on the left side.  Radial pulses are 2+ on the right side, and 2+ on the left side.   Femoral pulses are 2+ on the right side, and 2+ on the left side.  Popliteal pulses are 2+ on the right side, and 2+ on the left side.   Dorsalis pedis pulses are 2+ on the right side, and 2+ on the left side.   Posterior tibial pulses are 2+ on the right side, and 2+ on the left side.    Skin: No ecchymosis, erythema, or ulcers  Psych: Alert and oriented x 3, appropriate affect  Neuro: CNII-XII intact, no focal deficits    Labs:  Most Recent Data  CBC:   Lab Results   Component Value Date    WBC 6.29 09/12/2022    HGB 11.4 (L) 09/12/2022    HCT 35.3 (L) 09/12/2022     09/12/2022    MCV 93 09/12/2022    RDW 13.7 09/12/2022     BMP:   Lab Results   Component Value Date     09/12/2022    K 3.7 09/12/2022     09/12/2022    CO2 25 09/12/2022    BUN 14 09/12/2022    CREATININE 0.9 09/12/2022    GLU 88 09/12/2022    CALCIUM 8.9 09/12/2022     LFTS;   Lab Results   Component Value Date    PROT 7.0 09/12/2022    ALBUMIN 3.6 09/12/2022    BILITOT 0.6 09/12/2022    AST 21 09/12/2022    ALKPHOS 99 09/12/2022    ALT 23 09/12/2022     COAGS: No results found for: INR, PROTIME, PTT  FLP:   Lab Results   Component Value Date    CHOL 183 09/12/2022    HDL 34 (L) 09/12/2022    LDLCALC 119.0 09/12/2022    TRIG 150 09/12/2022    CHOLHDL  18.6 (L) 09/12/2022     CARDIAC: No results found for: TROPONINI, CKMB, BNP    Imaging:    BLE Venous Reflux Study 6/18/2021:  Doppler ultrasound evidence of valvular insufficiency and venous reflux in the left greater saphenous vein.    Assessment/Plan:  Hi Andres is a 52 y.o. male with HTN, HLD, EVGENY (on CPAP), morbid obesity, who presents for an initial appointment.    Bilateral Leg Pain- Etiology likely multifactorial with contribution from chronic venous insufficiency with lipodermatosclerosis and possible PAD.  Check BLE venous reflux study , exercise ANA study, BLE arterial ultrasound, and echo.  Refer to lymphedema clinic.  Pt to limit sodium intake to 2,000 mg daily.  Limit volume intake to 1.5 liters daily.  Elevate legs when resting.  Wear graduated compression hose.     2. HTN- Continue current medications.    3. HLD- Check lipids to determine ASCVD risk score.    4. Morbid Obesity- Refer to Bariatric Medicine for evaluation.    Follow up in 4 months     Total duration of face to face visit time 30 minutes.  Total time spent counseling greater than fifty percent of total visit time.  Counseling included discussion regarding imaging findings, diagnosis, possibilities, treatment options, risks and benefits.  The patient had many questions regarding the options and long-term effects.    Hal Swift MD, PhD  Interventional Cardiology

## 2022-12-12 ENCOUNTER — TELEPHONE (OUTPATIENT)
Dept: BARIATRICS | Facility: CLINIC | Age: 52
End: 2022-12-12
Payer: COMMERCIAL

## 2022-12-12 NOTE — TELEPHONE ENCOUNTER
Called pt to inform him that his appt with the  is a phone appt. Pt verbalized complete understanding.

## 2022-12-13 ENCOUNTER — CLINICAL SUPPORT (OUTPATIENT)
Dept: REHABILITATION | Facility: HOSPITAL | Age: 52
End: 2022-12-13
Attending: INTERNAL MEDICINE
Payer: COMMERCIAL

## 2022-12-13 DIAGNOSIS — I89.0 LYMPHEDEMA OF BOTH LOWER EXTREMITIES: ICD-10-CM

## 2022-12-13 PROCEDURE — 97165 OT EVAL LOW COMPLEX 30 MIN: CPT | Mod: PN

## 2022-12-13 PROCEDURE — 97140 MANUAL THERAPY 1/> REGIONS: CPT | Mod: PN

## 2022-12-13 NOTE — PLAN OF CARE
[facility]  Occupational Therapy Initial Evaluation     Name: Hi Andres  Clinic Number: [id number]    Therapy Diagnosis:   Encounter Diagnosis   Name Primary?    Lymphedema of both lower extremities      Physician: Hal Swift MD*    Physician Orders: Evaluation and treatment for Lymphedema  Medical Diagnosis: I89.0 (ICD-10-CM) - Lymphedema of both lower extremities  Evaluation Date: 12/13/2022  Insurance Authorization period Expiration: 12/16/2022-12/31/2022,1/1/2023-6/30/2023  Plan of Care Certification Period: 12/13/2022-03/13/2023    Visit # / Visits Authortized: 1 / 10  Time In:8:00  Time Out: 9:00  Total Billable Time: 60 minutes-Evaluation, Manual therapy    Precautions: Standard    Subjective     Hi rates pain at a 3/10 where 0 = no pain and 10 = maximal pain. Best pain gets 1/10. Worst pain gets 5/10.  He describes pain in the Bilateral lower extremities as burning and tight.  Patient's goals are as follows: Reduce the pain and swelling.    History of Present Illness: He reports having swelling in the legs a couple of weeks ago.  He has had darkening on the legs for 2 years.    Onset: The darkening of the legs for the past two years but he has only had swelling a couple of weeks.  Surgery: none  Previous Lymphedema Treatment: none    Current Level of Function: Independent with self care, driving and has a full time job.  He is independent with home management.  Prior Level of Function: Independent with self care, driving and has a full time job.  He is independent with home management.  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    Hi Andres  has a past medical history of Anemia, Arthritis, Broken foot, Gout, Hernia, abdominal, Hyperlipidemia, Hypertension, EVGENY on CPAP, and Vitamin D deficiency.    Hi Andres  has a past surgical history that includes Hernia repair and Colonoscopy (N/A,  7/23/2021).    Hi has a current medication list which includes the following prescription(s): allopurinol, amlodipine, ammonium lactate, ascorbic acid (vitamin c), aspirin, ciclopirox, ergocalciferol, hydrochlorothiazide, methylprednisolone, metoprolol succinate, mometasone, multivitamin, omega-3 fatty acids-fish oil, orlistat, tadalafil, triamcinolone acetonide 0.1%, and valsartan.    Review of patient's allergies indicates:  No Known Allergies       Objective     Amount of Swelling: Moderate due to bilateral involement  Location of Swelling: Bilateral lower extremities, from the toes to the popliteal fold  Skin Integrity: Visible skin intact, Dry, and discoloration circumferential.  Palpation/Texture: firm and irregular  Circulation: warm, well perfused  Posture: Good    Range of Motion:    Strength:    Adaptive Device used for ambulation:      Lower Extremity Girth Measurements (in centimeters)  LANDMARK  Right Lower Extremity  Left Lower Extremity    Superior border of the Patella +10 49.5 49.5   Knee 43.5 43   40 cm  40.5 39   30 cm 42 40.5   20 cm  30 28.5   10 cm  27 26.8   Malleolus 28.5 28   Ankle- heel to dorsum of foot 35.5 36   Arch 25.6 25   Total Girth Measurement 322.1 316.3   Total Girth Difference 5.8    Total Girth Reduction     Bilateral Limb Involvement  Moderate- 10-cm-15 cm TGD or < 5 cm GGD  Moderate/Severe- 15.1-20 cm TGD or 5.1 cm -10 cm GGD  Severe => 20 cm TGD or >10 cm GGD    Sensation: Within Normal Limits to light touch.    Treatment     Treatment Time In (separate from total time) : 08:45  Treatment Time Out (separate from total time : 9:00    Sebastián received Manual therapy for 15 minutes:  MULTILAYERED BANDAGING:  Issued supplies and bandaged Both Lower Extremities with 6cm, 8cm,10 cm rosidal rolls - cotton stockinette, cotton padding applied from the ankle to the popliteal fold, bandaged from the toes to the popliteal fold in a figure 8 pattern with short stretch compression  bandages,  To leave intact 12 -24 hours, discharge with any problems,  Return rolled bandages next session.      Issued Home Exercise Program and  pt verbally understood the instructions as they were given and demonstrated proper form and technique during therapy. Patient was advise to perform these exercises free of pain, and to stop performing them if pain occurs.    Patient/Family Education: role of Occupational Therapy, goals for Occupational Therapy, scheduling/cancellations - patient verbalized understanding. Discussed insurance limitations with patient. Patient was educated in lymphedema etiology and management plans.  Patient was provided with written  risk reductions and precautions for managing lymphedema.      Assessment     This patient presents with chronic swelling resulting in: lymphedema of the Bilateral lower extremities  increased pain, increased stiffness in the skin,  and placing the pt at higher risk of infection. Following medical record review it is determined that pt will benefit from occupational therapy services in order to maximize pain free and/or functional use of bilateral lower extremities. The following goals were discussed with the patient and patient is in agreement with them as to be addressed in the treatment plan. The patient's rehab potential is Good.     Anticipated barriers to occupational therapy: none  Pt has no cultural, educational or language barriers to learning provided.    Profile and History Assessment of Occupational Performance Level of Clinical Decision Making Complexity Score   Occupational Profile:   Hi Andres is a 52 y.o. male who lives with their spouse and is currently employed as a . Hi Andres has difficulty with standing for long periods affecting his/her daily functional abilities. His/her main goal for therapy is to reduce pain and skin integrity.     Comorbidities:   Chronic venous insufficiency  arthritis, HTN     Medical  and Therapy History Review:   Brief               Performance Deficits    Physical:  Skin Integrity/Scar Formation  Edema  Pain    Cognitive:  No Deficits    Psychosocial:    No Deficits     Clinical Decision Making:  high    Assessment Process:  Problem-Focused Assessments    Modification/Need for Assistance:  Not Necessary    Intervention Selection:  Several Treatment Options       low  Based on PMHX, co morbidities , data from assessments and functional level of assistance required with task and clinical presentation directly impacting function.         Short Term Goals: (4 weeks)  Patient to be Independent and compliant with Home Exercise Program to increase lymphatic flow.  Decrease girth in Bilateral lower extremities by 6 cm for improved functional use of Bilateral Lower Extremity.  Patient will demonstrate 100% knowledge of lymphedema precautions and signs of infection.   Patient will perform self-bandaging techniques.  Patient will perform self lymph drainage techniques to increase lymph uptake and direct lymph flow.  Patient will tolerate daily activities with multilayered bandaging or compression garment.  Skin integrity improve to Good, skin will be superficially hydrated, fungal infection will resolve, color will fade to pink and temperature will be room temperature.    Long Term Goals: (8 weeks)  Decrease girth in Bilateral lower extremities by 10 cm for improved functional use of Bilateral Lower Extremity.  Patient will show reduction in girth to mild or less with improved contour of limb.  Patient to mylene/doff compression garment with daily compliance.   Patient will demonstrate the ability to independently manage condition by discharge.    Plan     Patient to be seen 1-2 x per week for 90 days for the medically necessary treatments to include: decongestive massage- Manual lymphatic drainage, Kinesio tape, skin care, multi layered bandaging, education in lymphedema precautions, self massage, self  bandaging, and assistance in obtaining appropriate compression garment.  Therapeutic exercise, postural correction, and progression of Home Exercise Program.      STANTON Blanton, CARRIET

## 2022-12-14 ENCOUNTER — HOSPITAL ENCOUNTER (OUTPATIENT)
Dept: RADIOLOGY | Facility: HOSPITAL | Age: 52
Discharge: HOME | End: 2022-12-14
Attending: NURSE PRACTITIONER
Payer: COMMERCIAL

## 2022-12-14 DIAGNOSIS — M79.604 PAIN IN BOTH LOWER EXTREMITIES: ICD-10-CM

## 2022-12-14 DIAGNOSIS — M79.605 PAIN IN BOTH LOWER EXTREMITIES: ICD-10-CM

## 2022-12-14 PROCEDURE — 93970 US LOWER EXTREMITY VEINS BILATERAL: ICD-10-PCS | Mod: 26,,, | Performed by: RADIOLOGY

## 2022-12-14 PROCEDURE — 93922 UPR/L XTREMITY ART 2 LEVELS: CPT | Mod: 26,,, | Performed by: RADIOLOGY

## 2022-12-14 PROCEDURE — 93970 EXTREMITY STUDY: CPT | Mod: 26,,, | Performed by: RADIOLOGY

## 2022-12-14 PROCEDURE — 93922 US ARTERIAL LOWER EXTREMITY BILAT WITH ABI (XPD): ICD-10-PCS | Mod: 26,,, | Performed by: RADIOLOGY

## 2022-12-14 PROCEDURE — 93925 US ARTERIAL LOWER EXTREMITY BILAT WITH ABI (XPD): ICD-10-PCS | Mod: 26,,, | Performed by: RADIOLOGY

## 2022-12-14 PROCEDURE — 93925 LOWER EXTREMITY STUDY: CPT | Mod: TC

## 2022-12-14 PROCEDURE — 93970 EXTREMITY STUDY: CPT | Mod: TC

## 2022-12-14 PROCEDURE — 93925 LOWER EXTREMITY STUDY: CPT | Mod: 26,,, | Performed by: RADIOLOGY

## 2022-12-16 ENCOUNTER — CLINICAL SUPPORT (OUTPATIENT)
Dept: REHABILITATION | Facility: HOSPITAL | Age: 52
End: 2022-12-16
Payer: COMMERCIAL

## 2022-12-16 DIAGNOSIS — I89.0 LYMPHEDEMA OF BOTH LOWER EXTREMITIES: Primary | ICD-10-CM

## 2022-12-16 PROCEDURE — 97140 MANUAL THERAPY 1/> REGIONS: CPT | Mod: PN

## 2022-12-16 NOTE — PROGRESS NOTES
Occupational Therapy Daily Treatment Note     Name: Hi CORREIA [facility]  Clinic Number: [id number]    Therapy Diagnosis:   Encounter Diagnosis   Name Primary?    Lymphedema of both lower extremities Yes     Physician: Hal Swift MD*    Visit Date: 12/16/2022  Physician Orders: Evaluation and treatment for Lymphedema  Medical Diagnosis: I89.0 (ICD-10-CM) - Lymphedema of both lower extremities  Evaluation Date: 12/13/2022  Insurance Authorization period Expiration: 12/16/2022-12/31/2022,1/1/2023-6/30/2023  Plan of Care Certification Period: 12/13/2022-03/13/2023     Visit # / Visits Authortized: 2 / 10  Time In:11:30  Time Out: 12:30  Total Billable Time: 60 minutes-Manual therapy     Precautions: Standard  Subjective     Patient reports: That his legs are tight feeling.  He reports that he is embarrassed to go out in town wearing shorts..  He was compliant with home exercise program.  Functional change: none    Pain: 3/10  Location: bilateral lower legs     Objective     Response to previous treatment: He was able to wear the compression stockings as recommended.  The skin is flaking and dark.  The skin is firm to touch and lack skin mobility in any direction where the darkness is present.   Treatment:   Hi received the following manual therapy techniques:- Manual Lymphatic Drainage was performed and compression bandages and kinesio tape was applied to the: Bilateral lower extremities  for 60 minutes.    MANUAL LYMPHATIC DRAINAGE:    While seated with both legs in a dependent position drainage of entire Lower Extremities especially lower leg, ankle, and foot with return proximally,  Use of Aquaphor due to dryness.   Educated in self massage to abdominal areas, Both inguinal areas, thigh, and remaining Lower extremities within reach.    Kinesio tape was applied to the Medial surface of the Bilateral lower extremities in a basketweave application with 25% tension from proximal to distal  to increase  lymph uptake and direct lymph flow.     MULTILAYERED BANDAGING:  issued supplies and bandaged Bilateral Lower Extremities with cotton stockinette, cotton cast padding, Lopress compression bandages 6 cm, 8cm, 10 cm applied from the base of the toes to the popliteal fold applied in a figure 8 pattern.  Instructed to leave intact 12-48 hours as tolerated, discontinue with any problems, return rolled bandages next session.     Home Exercises Provided and Patient Education Provided     Education provided:      PATIENT/FAMILY Education: bandaging wear schedule,  Home Exercise Program,  Beginning of self massage,  Self or assisted bandaging, compression options, and Risk reduction    Written Home Exercises Provided:   Hi demonstrated good  understanding of the education provided.       Assessment     He arrived to therapy with his compression bandages with him.  He has darkening on the Bilateral lower extremities.  The areas of the skin that are darkened is immobile.  The skin is dry and flaky.  Over the counter fungal cream was applied to the Bilateral lower extremities to address any fungal infection that may be present.  Kinesio tape is being applied to the Bilateral lower extremities as a manual technique to increase lymph uptake and direct lymph flow.   Hi Is progressing well towards his goals.   Patient prognosis is Good.     Patient will continue to benefit from skilled outpatient occupational therapy to address the deficits listed in the problem list box on initial evaluation, provide pt/family education and to maximize pt's level of independence in the home and community environment.     Patient's spiritual, cultural and educational needs considered and pt agreeable to plan of care and goals.     Anticipated barriers to occupational therapy: none    Goals:      Short Term Goals: (4 weeks)  In Progress  Patient to be Independent and compliant with Home Exercise Program to increase lymphatic flow.  In  Progress  Decrease girth in Bilateral lower extremities by 6 cm for improved functional use of Bilateral Lower Extremity.  In Progress  Patient will demonstrate 100% knowledge of lymphedema precautions and signs of infection.   In Progress  Patient will perform self-bandaging techniques.  In Progress  Patient will perform self lymph drainage techniques to increase lymph uptake and direct lymph flow.  In Progress  Patient will tolerate daily activities with multilayered bandaging or compression garment.  In Progress  Skin integrity improve to Good, skin will be superficially hydrated, fungal infection will resolve, color will fade to pink and temperature will be room temperature.     Long Term Goals: (8 weeks)  In Progress  Decrease girth in Bilateral lower extremities by 10 cm for improved functional use of Bilateral Lower Extremity.  In Progress  Patient will show reduction in girth to mild or less with improved contour of limb.  In Progress  Patient to mylene/doff compression garment with daily compliance.   In Progress  Patient will demonstrate the ability to independently manage condition by discharge.     Plan      Patient to be seen 1-2 x per week for 90 days for the medically necessary treatments to include: decongestive massage- Manual lymphatic drainage, Kinesio tape, skin care, multi layered bandaging, education in lymphedema precautions, self massage, self bandaging, and assistance in obtaining appropriate compression garment.  Therapeutic exercise, postural correction, and progression of Home Exercise Program.       STANTON Blanton, CLT

## 2022-12-19 ENCOUNTER — HOSPITAL ENCOUNTER (OUTPATIENT)
Dept: CARDIOLOGY | Facility: HOSPITAL | Age: 52
Discharge: HOME | End: 2022-12-19
Attending: INTERNAL MEDICINE
Payer: COMMERCIAL

## 2022-12-19 VITALS
HEART RATE: 74 BPM | DIASTOLIC BLOOD PRESSURE: 82 MMHG | SYSTOLIC BLOOD PRESSURE: 144 MMHG | HEIGHT: 66 IN | BODY MASS INDEX: 46.77 KG/M2 | WEIGHT: 291 LBS

## 2022-12-19 DIAGNOSIS — M79.662 PAIN IN BOTH LOWER LEGS: ICD-10-CM

## 2022-12-19 DIAGNOSIS — M79.661 PAIN IN BOTH LOWER LEGS: ICD-10-CM

## 2022-12-19 DIAGNOSIS — I73.9 CLAUDICATION OF BOTH LOWER EXTREMITIES: ICD-10-CM

## 2022-12-19 LAB
ABI CLAUDICATION TIME: 0 MIN
ASCENDING AORTA: 2.83 CM
AV INDEX (PROSTH): 0.72
AV MEAN GRADIENT: 7 MMHG
AV PEAK GRADIENT: 13 MMHG
AV VALVE AREA: 3.05 CM2
AV VELOCITY RATIO: 0.62
BSA FOR ECHO PROCEDURE: 2.48 M2
CV ECHO LV RWT: 0.31 CM
DOP CALC AO PEAK VEL: 1.81 M/S
DOP CALC AO VTI: 31.22 CM
DOP CALC LVOT AREA: 4.3 CM2
DOP CALC LVOT DIAMETER: 2.33 CM
DOP CALC LVOT PEAK VEL: 1.12 M/S
DOP CALC LVOT STROKE VOLUME: 95.25 CM3
DOP CALCLVOT PEAK VEL VTI: 22.35 CM
E WAVE DECELERATION TIME: 175.02 MSEC
E/A RATIO: 1.14
E/E' RATIO: 10.11 M/S
ECHO LV POSTERIOR WALL: 0.89 CM (ref 0.6–1.1)
EJECTION FRACTION: 55 %
FRACTIONAL SHORTENING: 37 % (ref 28–44)
IMMEDIATE ARM BP: 160 MMHG
IMMEDIATE LEFT ABI: 1.18
IMMEDIATE LEFT TIBIAL: 189 MMHG
IMMEDIATE RIGHT ABI: 1.09
IMMEDIATE RIGHT TIBIAL: 175 MMHG
INTERVENTRICULAR SEPTUM: 0.94 CM (ref 0.6–1.1)
IVRT: 71.36 MSEC
LA MAJOR: 5.7 CM
LA MINOR: 5.42 CM
LA WIDTH: 3.9 CM
LEFT ABI: 1.16
LEFT ANT TIBIAL SYS PSV: 58 CM/S
LEFT ARM BP: 122 MMHG
LEFT ATRIUM SIZE: 4.44 CM
LEFT ATRIUM VOLUME INDEX MOD: 29.9 ML/M2
LEFT ATRIUM VOLUME INDEX: 34.8 ML/M2
LEFT ATRIUM VOLUME MOD: 70.27 CM3
LEFT ATRIUM VOLUME: 81.78 CM3
LEFT CFA PSV: 160 CM/S
LEFT DORSALIS PEDIS: 138 MMHG
LEFT EXTERNAL ILIAC PSV: 90 CM/S
LEFT GREAT SAPHENOUS DISTAL THIGH DIA: 0.26 CM
LEFT GREAT SAPHENOUS JUNCTION DIA: 0.41 CM
LEFT GREAT SAPHENOUS KNEE DIA: 0.21 CM
LEFT GREAT SAPHENOUS MIDDLE THIGH DIA: 0.4 CM
LEFT GREAT SAPHENOUS PROXIMAL CALF DIA: 0.14 CM
LEFT INTERNAL DIMENSION IN SYSTOLE: 3.62 CM (ref 2.1–4)
LEFT POPLITEAL PSV: 77 CM/S
LEFT POST TIBIAL SYS PSV: 80 CM/S
LEFT POSTERIOR TIBIAL: 142 MMHG
LEFT PROFUNDA SYS PSV: 102 CM/S
LEFT SMALL SAPHENOUS SPJ DIA: 0.17 CM
LEFT SUPER FEMORAL DIST SYS PSV: 89 CM/S
LEFT SUPER FEMORAL MID SYS PSV: 111 CM/S
LEFT SUPER FEMORAL OSTIAL SYS PSV: 112 CM/S
LEFT SUPER FEMORAL PROX SYS PSV: 126 CM/S
LEFT TIB/PER TRUNK SYS PSV: 93 CM/S
LEFT VENTRICLE DIASTOLIC VOLUME INDEX: 69.82 ML/M2
LEFT VENTRICLE DIASTOLIC VOLUME: 164.07 ML
LEFT VENTRICLE MASS INDEX: 87 G/M2
LEFT VENTRICLE SYSTOLIC VOLUME INDEX: 23.5 ML/M2
LEFT VENTRICLE SYSTOLIC VOLUME: 55.23 ML
LEFT VENTRICULAR INTERNAL DIMENSION IN DIASTOLE: 5.76 CM (ref 3.5–6)
LEFT VENTRICULAR MASS: 205.39 G
LV LATERAL E/E' RATIO: 10.11 M/S
LV SEPTAL E/E' RATIO: 10.11 M/S
MV A" WAVE DURATION": 6.85 MSEC
MV PEAK A VEL: 0.8 M/S
MV PEAK E VEL: 0.91 M/S
MV STENOSIS PRESSURE HALF TIME: 50.75 MS
MV VALVE AREA P 1/2 METHOD: 4.33 CM2
OHS CV LEFT LOWER EXTREMITY ABI (NO CALC): 1.2
OHS CV RIGHT ABI LOWER EXTREMITY (NO CALC): 1.1
PISA TR MAX VEL: 2.52 M/S
PULM VEIN S/D RATIO: 1.36
PV PEAK D VEL: 0.42 M/S
PV PEAK S VEL: 0.57 M/S
RA MAJOR: 5.25 CM
RA PRESSURE: 3 MMHG
RA WIDTH: 4.56 CM
RIGHT ABI: 1.11
RIGHT ANT TIBIAL SYS PSV: 62 CM/S
RIGHT ARM BP: 111 MMHG
RIGHT CFA PSV: 115 CM/S
RIGHT DORSALIS PEDIS: 132 MMHG
RIGHT EXTERNAL ILLIAC PSV: 108 CM/S
RIGHT GREAT SAPHENOUS DISTAL THIGH DIA: 0.46 CM
RIGHT GREAT SAPHENOUS JUNCTION DIA: 0.58 CM
RIGHT GREAT SAPHENOUS KNEE DIA: 0.24 CM
RIGHT GREAT SAPHENOUS MIDDLE THIGH DIA: 0.45 CM
RIGHT GREAT SAPHENOUS PROXIMAL CALF DIA: 0.15 CM
RIGHT POPLITEAL PSV: 105 CM/S
RIGHT POST TIBIAL SYS PSV: 88 CM/S
RIGHT POSTERIOR TIBIAL: 136 MMHG
RIGHT PROFUNDA SYS PSV: 76 CM/S
RIGHT SUPER FEMORAL DIST SYS PSV: 117 CM/S
RIGHT SUPER FEMORAL MID SYS PSV: 113 CM/S
RIGHT SUPER FEMORAL OSTIAL SYS PSV: 128 CM/S
RIGHT SUPER FEMORAL PROX SYS PSV: 123 CM/S
RIGHT TIB/PER TRUNK SYS PSV: 81 CM/S
RIGHT VENTRICULAR END-DIASTOLIC DIMENSION: 4.08 CM
RV TISSUE DOPPLER FREE WALL SYSTOLIC VELOCITY 1 (APICAL 4 CHAMBER VIEW): 17.7 CM/S
SINUS: 3.01 CM
STJ: 2.71 CM
TDI LATERAL: 0.09 M/S
TDI SEPTAL: 0.09 M/S
TDI: 0.09 M/S
TR MAX PG: 25 MMHG
TREADMILL GRADE: 12 %
TREADMILL SPEED: 2 MPH
TREADMILL TIME: 5 MIN
TRICUSPID ANNULAR PLANE SYSTOLIC EXCURSION: 3.15 CM
TV REST PULMONARY ARTERY PRESSURE: 28 MMHG

## 2022-12-19 PROCEDURE — 93306 TTE W/DOPPLER COMPLETE: CPT

## 2022-12-19 PROCEDURE — 93925 CV US DOPPLER ARTERIAL LEGS BILATERAL (CUPID ONLY): ICD-10-PCS | Mod: 26,,, | Performed by: INTERNAL MEDICINE

## 2022-12-19 PROCEDURE — 93970 EXTREMITY STUDY: CPT | Mod: TC

## 2022-12-19 PROCEDURE — 93924 LWR XTR VASC STDY BILAT: CPT

## 2022-12-19 PROCEDURE — 93306 ECHO (CUPID ONLY): ICD-10-PCS | Mod: 26,,, | Performed by: INTERNAL MEDICINE

## 2022-12-19 PROCEDURE — 93306 TTE W/DOPPLER COMPLETE: CPT | Mod: 26,,, | Performed by: INTERNAL MEDICINE

## 2022-12-19 PROCEDURE — 93924 ANKLE BRACHIAL INDICES (ABI): ICD-10-PCS | Mod: 26,,, | Performed by: INTERNAL MEDICINE

## 2022-12-19 PROCEDURE — 93925 LOWER EXTREMITY STUDY: CPT

## 2022-12-19 PROCEDURE — 93970 EXTREMITY STUDY: CPT | Mod: 26,,, | Performed by: INTERNAL MEDICINE

## 2022-12-19 PROCEDURE — 93925 LOWER EXTREMITY STUDY: CPT | Mod: 26,,, | Performed by: INTERNAL MEDICINE

## 2022-12-19 PROCEDURE — 93970 CV US LOWER VENOUS INSUFFICIENCY BILATERAL (CUPID ONLY): ICD-10-PCS | Mod: 26,,, | Performed by: INTERNAL MEDICINE

## 2022-12-19 PROCEDURE — 93924 LWR XTR VASC STDY BILAT: CPT | Mod: 26,,, | Performed by: INTERNAL MEDICINE

## 2022-12-20 ENCOUNTER — CLINICAL SUPPORT (OUTPATIENT)
Dept: REHABILITATION | Facility: HOSPITAL | Age: 52
End: 2022-12-20
Payer: COMMERCIAL

## 2022-12-20 DIAGNOSIS — I89.0 LYMPHEDEMA OF BOTH LOWER EXTREMITIES: Primary | ICD-10-CM

## 2022-12-20 PROCEDURE — 97140 MANUAL THERAPY 1/> REGIONS: CPT | Mod: PN

## 2022-12-20 NOTE — PROGRESS NOTES
Occupational Therapy Daily Treatment Note     Name: Hi CORREIA [facility]  Clinic Number: [id number]    Therapy Diagnosis:   Encounter Diagnosis   Name Primary?    Lymphedema of both lower extremities Yes     Physician: Hal Swift MD*    Visit Date: 12/20/2022  Physician Orders: Evaluation and treatment for Lymphedema  Medical Diagnosis: I89.0 (ICD-10-CM) - Lymphedema of both lower extremities  Evaluation Date: 12/13/2022  Insurance Authorization period Expiration: 12/16/2022-12/31/2022,1/1/2023-6/30/2023  Plan of Care Certification Period: 12/13/2022-03/13/2023     Visit # / Visits Authortized: 3/ 10  Time In:8:00  Time Out: 9:00  Total Billable Time: 60 minutes-Manual therapy     Precautions: Standard  Subjective     Patient reports: That his legs which were itching a lot but stopped itching by the end of the session.   He was compliant with home exercise program.  Functional change: none    Pain: 3/10  Location: bilateral lower legs     Objective     Response to previous treatment: He was able to wear the compression bandages as recommended.  The skin is flaking and dark.  The skin was firm to touch and lacked skin mobility in any direction where the darkness is present last session.  This session, the skin is beginning to give in all directions and becoming more supple but still a little stiff.   Treatment:   Hi received the following manual therapy techniques:- Manual Lymphatic Drainage was performed and compression bandages and kinesio tape was applied to the: Bilateral lower extremities  for 60 minutes.    MANUAL LYMPHATIC DRAINAGE:    While seated with both legs in a dependent position drainage of entire Lower Extremities especially lower leg, ankle, and foot with return proximally,  Use of Aquaphor due to dryness.   Educated in self massage to abdominal areas, Both inguinal areas, thigh, and remaining Lower extremities within reach.    MULTILAYERED BANDAGING:  issued supplies and bandaged  Bilateral Lower Extremities with cotton stockinette, cotton cast padding, Lopress compression bandages 6 cm, 8cm, 10 cm applied from the base of the toes to the popliteal fold applied in a figure 8 pattern.  Instructed to leave intact 12-48 hours as tolerated, discontinue with any problems, return rolled bandages next session.     Home Exercises Provided and Patient Education Provided     Education provided:      PATIENT/FAMILY Education: bandaging wear schedule,  Home Exercise Program,  Beginning of self massage,  Self or assisted bandaging, compression options, and Risk reduction    Written Home Exercises Provided:   Hi demonstrated good  understanding of the education provided.       Assessment     He arrived to therapy with his compression bandages with him.  He has darkening on the Bilateral lower extremities.  The areas of the skin that are darkened is immobile but becoming more supple.  The skin is dry and flaky. Kinesio tape is being applied to the Bilateral lower extremities as a manual technique to increase lymph uptake and direct lymph flow.   Hi Is progressing well towards his goals.   Patient prognosis is Good.     Patient will continue to benefit from skilled outpatient occupational therapy to address the deficits listed in the problem list box on initial evaluation, provide pt/family education and to maximize pt's level of independence in the home and community environment.     Patient's spiritual, cultural and educational needs considered and pt agreeable to plan of care and goals.     Anticipated barriers to occupational therapy: none    Goals:      Short Term Goals: (4 weeks)  In Progress  Patient to be Independent and compliant with Home Exercise Program to increase lymphatic flow.  In Progress  Decrease girth in Bilateral lower extremities by 6 cm for improved functional use of Bilateral Lower Extremity.  In Progress  Patient will demonstrate 100% knowledge of lymphedema precautions  and signs of infection.   In Progress  Patient will perform self-bandaging techniques.  In Progress  Patient will perform self lymph drainage techniques to increase lymph uptake and direct lymph flow.  In Progress  Patient will tolerate daily activities with multilayered bandaging or compression garment.  In Progress  Skin integrity improve to Good, skin will be superficially hydrated, fungal infection will resolve, color will fade to pink and temperature will be room temperature.     Long Term Goals: (8 weeks)  In Progress  Decrease girth in Bilateral lower extremities by 10 cm for improved functional use of Bilateral Lower Extremity.  In Progress  Patient will show reduction in girth to mild or less with improved contour of limb.  In Progress  Patient to mylene/doff compression garment with daily compliance.   In Progress  Patient will demonstrate the ability to independently manage condition by discharge.     Plan      Patient to be seen 1-2 x per week for 90 days for the medically necessary treatments to include: decongestive massage- Manual lymphatic drainage, Kinesio tape, skin care, multi layered bandaging, education in lymphedema precautions, self massage, self bandaging, and assistance in obtaining appropriate compression garment.  Therapeutic exercise, postural correction, and progression of Home Exercise Program.       STANTON Blanton, CLT

## 2022-12-27 ENCOUNTER — CLINICAL SUPPORT (OUTPATIENT)
Dept: REHABILITATION | Facility: HOSPITAL | Age: 52
End: 2022-12-27
Payer: COMMERCIAL

## 2022-12-27 DIAGNOSIS — I89.0 LYMPHEDEMA OF BOTH LOWER EXTREMITIES: Primary | ICD-10-CM

## 2022-12-27 PROCEDURE — 97140 MANUAL THERAPY 1/> REGIONS: CPT | Mod: PN

## 2022-12-27 NOTE — PROGRESS NOTES
Occupational Therapy Daily Treatment Note     Name: Hi CORREIA [facility]  Clinic Number: [id number]    Therapy Diagnosis:   Encounter Diagnosis   Name Primary?    Lymphedema of both lower extremities Yes     Physician: Hal Swift MD*    Visit Date: 12/27/2022  Physician Orders: Evaluation and treatment for Lymphedema  Medical Diagnosis: I89.0 (ICD-10-CM) - Lymphedema of both lower extremities  Evaluation Date: 12/13/2022  Insurance Authorization period Expiration: 12/16/2022-12/31/2022,1/1/2023-6/30/2023  Plan of Care Certification Period: 12/13/2022-03/13/2023     Visit # / Visits Authortized: 4/ 10  Time In:8:05  Time Out: 9:00  Total Billable Time: 55 minutes-Manual therapy     Precautions: Standard  Subjective     Patient reports: That his legs are lightening up and are becoming .  Functional change: none    Pain: 3/10  Location: bilateral lower legs     Objective     Response to previous treatment: He was able to wear the compression bandages as recommended.  The skin is flaking and the dark skin is sloughing off.  The skin is firm to touch and lacked skin mobility in any direction where the darkness is present last session.  This session, the skin is beginning to give in all directions and becoming more supple but still a little stiff.   Treatment:   Hi received the following manual therapy techniques:- Manual Lymphatic Drainage was performed and compression bandages and kinesio tape was applied to the: Bilateral lower extremities  for 60 minutes.    MANUAL LYMPHATIC DRAINAGE:    While seated with both legs in a dependent position drainage of entire Lower Extremities especially lower leg, ankle, and foot with return proximally,  Use of Aquaphor due to dryness.   Educated in self massage to abdominal areas, Both inguinal areas, thigh, and remaining Lower extremities within reach.    Kinesio tape was applied to the Bilateral lower extremities in a demarcus cross fan application with 25% tension from  proximal to distal to increase lymph uptake and direct lymph flow.     MULTILAYERED BANDAGING:  issued supplies and bandaged Bilateral Lower Extremities with cotton stockinette, cotton cast padding, Lopress compression bandages 6 cm, 8cm, 10 cm applied from the base of the toes to the popliteal fold applied in a figure 8 pattern.  Instructed to leave intact 12-48 hours as tolerated, discontinue with any problems, return rolled bandages next session.     Home Exercises Provided and Patient Education Provided     Education provided:      PATIENT/FAMILY Education: bandaging wear schedule,  Home Exercise Program,  Beginning of self massage,  Self or assisted bandaging, compression options, and Risk reduction    Written Home Exercises Provided:   Hi demonstrated good  understanding of the education provided.       Assessment   He arrived to therapy with his compression bandages with him.  He has darkening on the Bilateral lower extremities that is gradually lightening.  The areas of the skin that are darkened is immobile but becoming more supple.  The skin is dry and flaky. Kinesio tape is being applied to the Bilateral lower extremities as a manual technique to increase lymph uptake and direct lymph flow.   Hi Is progressing well towards his goals.   Patient prognosis is Good.     Patient will continue to benefit from skilled outpatient occupational therapy to address the deficits listed in the problem list box on initial evaluation, provide pt/family education and to maximize pt's level of independence in the home and community environment.     Patient's spiritual, cultural and educational needs considered and pt agreeable to plan of care and goals.     Anticipated barriers to occupational therapy: none    Goals:      Short Term Goals: (4 weeks)  In Progress  Patient to be Independent and compliant with Home Exercise Program to increase lymphatic flow.  In Progress  Decrease girth in Bilateral lower  extremities by 6 cm for improved functional use of Bilateral Lower Extremity.  In Progress  Patient will demonstrate 100% knowledge of lymphedema precautions and signs of infection.   In Progress  Patient will perform self-bandaging techniques.  In Progress  Patient will perform self lymph drainage techniques to increase lymph uptake and direct lymph flow.  In Progress  Patient will tolerate daily activities with multilayered bandaging or compression garment.  In Progress  Skin integrity improve to Good, skin will be superficially hydrated, fungal infection will resolve, color will fade to pink and temperature will be room temperature.     Long Term Goals: (8 weeks)  In Progress  Decrease girth in Bilateral lower extremities by 10 cm for improved functional use of Bilateral Lower Extremity.  In Progress  Patient will show reduction in girth to mild or less with improved contour of limb.  In Progress  Patient to mylene/doff compression garment with daily compliance.   In Progress  Patient will demonstrate the ability to independently manage condition by discharge.     Plan      Patient to be seen 1-2 x per week for 90 days for the medically necessary treatments to include: decongestive massage- Manual lymphatic drainage, Kinesio tape, skin care, multi layered bandaging, education in lymphedema precautions, self massage, self bandaging, and assistance in obtaining appropriate compression garment.  Therapeutic exercise, postural correction, and progression of Home Exercise Program.       STANTON Blanton, CLT

## 2023-01-03 ENCOUNTER — CLINICAL SUPPORT (OUTPATIENT)
Dept: REHABILITATION | Facility: HOSPITAL | Age: 53
End: 2023-01-03
Payer: COMMERCIAL

## 2023-01-03 DIAGNOSIS — I89.0 LYMPHEDEMA OF BOTH LOWER EXTREMITIES: Primary | ICD-10-CM

## 2023-01-03 PROCEDURE — 97140 MANUAL THERAPY 1/> REGIONS: CPT | Mod: PN

## 2023-01-03 NOTE — PROGRESS NOTES
"  Occupational Therapy Daily Treatment Note     Name: Hi CORREIA [facility]  Clinic Number: [id number]    Therapy Diagnosis:   Encounter Diagnosis   Name Primary?    Lymphedema of both lower extremities Yes     Physician: Hal Swift MD*    Visit Date: 1/3/2023  Physician Orders: Evaluation and treatment for Lymphedema  Medical Diagnosis: I89.0 (ICD-10-CM) - Lymphedema of both lower extremities  Evaluation Date: 12/13/2022  Insurance Authorization period Expiration: 12/16/2022-12/31/2022,1/1/2023-6/30/2023  Plan of Care Certification Period: 12/13/2022-03/13/2023     Visit # / Visits Authortized: 5/ 10  Time In:9:05  Time Out: 10:00  Total Billable Time: 55 minutes-Manual therapy     Precautions: Standard  Subjective     Patient reports: That his legs are lightening up and are becoming less tight.  Functional change: none    Pain: 3/10  Location: bilateral lower legs     Objective     Response to previous treatment: He was able to wear the compression bandages as recommended.  The skin is flaking and the dark skin is sloughing off.  The skin is firm to touch and lacked skin mobility in any direction where the darkness is present last session.  This session, the skin is beginning to give in all directions and becoming more supple but still a little stiff.   Treatment:   Hi received the following manual therapy techniques:- Manual Lymphatic Drainage was performed and compression bandages and kinesio tape was applied to the: Bilateral lower extremities  for 60 minutes.    MANUAL LYMPHATIC DRAINAGE:    While seated with both legs in a dependent position drainage of entire Lower Extremities especially lower leg, ankle, and foot with return proximally,  Use of Aquaphor due to dryness.   Educated in self massage to abdominal areas, Both inguinal areas, thigh, and remaining Lower extremities within reach.    Kinesio tape was applied to the Bilateral lower extremities in a "W" application with oscillations from " proximal to distal and distal to proximal to increase lymph uptake and direct lymph flow.     MULTILAYERED BANDAGING:  issued supplies and bandaged Bilateral Lower Extremities with cotton stockinette, cotton cast padding, Lopress compression bandages 6 cm, 8cm, 10 cm applied from the base of the toes to the popliteal fold applied in a figure 8 pattern.  Instructed to leave intact 12-48 hours as tolerated, discontinue with any problems, return rolled bandages next session.     Home Exercises Provided and Patient Education Provided     Education provided:      PATIENT/FAMILY Education: bandaging wear schedule,  Home Exercise Program,  Beginning of self massage,  Self or assisted bandaging, compression options, and Risk reduction    Written Home Exercises Provided:   Hi demonstrated good  understanding of the education provided.       Assessment   He arrived to therapy with his compression bandages with him.  He has darkening on the Bilateral lower extremities that is gradually lightening.  The areas of the skin that are darkened is immobile but becoming more supple.  The skin is dry and flaky. Kinesio tape is being applied to the Bilateral lower extremities as a manual technique to increase lymph uptake and direct lymph flow.  He was agreeable to having his legs wrapped with compression bandages.  Hi Is progressing well towards his goals.   Patient prognosis is Good.     Patient will continue to benefit from skilled outpatient occupational therapy to address the deficits listed in the problem list box on initial evaluation, provide pt/family education and to maximize pt's level of independence in the home and community environment.     Patient's spiritual, cultural and educational needs considered and pt agreeable to plan of care and goals.     Anticipated barriers to occupational therapy: none    Goals:      Short Term Goals: (4 weeks)  In Progress  Patient to be Independent and compliant with Home  Exercise Program to increase lymphatic flow.  In Progress  Decrease girth in Bilateral lower extremities by 6 cm for improved functional use of Bilateral Lower Extremity.  In Progress  Patient will demonstrate 100% knowledge of lymphedema precautions and signs of infection.   In Progress  Patient will perform self-bandaging techniques.  In Progress  Patient will perform self lymph drainage techniques to increase lymph uptake and direct lymph flow.  In Progress  Patient will tolerate daily activities with multilayered bandaging or compression garment.  In Progress  Skin integrity improve to Good, skin will be superficially hydrated, fungal infection will resolve, color will fade to pink and temperature will be room temperature.     Long Term Goals: (8 weeks)  In Progress  Decrease girth in Bilateral lower extremities by 10 cm for improved functional use of Bilateral Lower Extremity.  In Progress  Patient will show reduction in girth to mild or less with improved contour of limb.  In Progress  Patient to mylene/doff compression garment with daily compliance.   In Progress  Patient will demonstrate the ability to independently manage condition by discharge.     Plan      Patient to be seen 1-2 x per week for 90 days for the medically necessary treatments to include: decongestive massage- Manual lymphatic drainage, Kinesio tape, skin care, multi layered bandaging, education in lymphedema precautions, self massage, self bandaging, and assistance in obtaining appropriate compression garment.  Therapeutic exercise, postural correction, and progression of Home Exercise Program.       STANTON Blanton, CLT

## 2023-01-06 ENCOUNTER — TELEPHONE (OUTPATIENT)
Dept: CARDIOLOGY | Facility: CLINIC | Age: 53
End: 2023-01-06
Payer: COMMERCIAL

## 2023-01-06 ENCOUNTER — CLINICAL SUPPORT (OUTPATIENT)
Dept: REHABILITATION | Facility: HOSPITAL | Age: 53
End: 2023-01-06
Payer: COMMERCIAL

## 2023-01-06 DIAGNOSIS — I89.0 LYMPHEDEMA OF BOTH LOWER EXTREMITIES: Primary | ICD-10-CM

## 2023-01-06 PROCEDURE — 97140 MANUAL THERAPY 1/> REGIONS: CPT | Mod: PN

## 2023-01-06 NOTE — PROGRESS NOTES
"  Occupational Therapy Daily Treatment Note     Name: Hi CORREIA [facility]  Clinic Number: [id number]    Therapy Diagnosis:   Encounter Diagnosis   Name Primary?    Lymphedema of both lower extremities Yes     Physician: Hal Swift MD*    Visit Date: 1/6/2023  Physician Orders: Evaluation and treatment for Lymphedema  Medical Diagnosis: I89.0 (ICD-10-CM) - Lymphedema of both lower extremities  Evaluation Date: 12/13/2022  Insurance Authorization period Expiration: 12/16/2022-12/31/2022,1/1/2023-6/30/2023  Plan of Care Certification Period: 12/13/2022-03/13/2023     Visit # / Visits Authortized: 6/ 10  Time In:8:30  Time Out: 9:30  Total Billable Time: 60 minutes-Manual therapy     Precautions: Standard  Subjective     Patient reports: That his legs are lightening up and are becoming less tight.  Functional change: none    Pain: 3/10  Location: bilateral lower legs     Objective     Response to previous treatment: He was able to wear the compression bandages as recommended.  The skin is flaking and the dark skin is sloughing off.  The skin is becoming more supple there is still some areas that lack skin mobility in any direction where the darkness is present last session.      Treatment:   Hi received the following manual therapy techniques:- Manual Lymphatic Drainage was performed and compression bandages and kinesio tape was applied to the: Bilateral lower extremities  for 60 minutes.    MANUAL LYMPHATIC DRAINAGE:    While seated with both legs in a dependent position drainage of entire Lower Extremities especially lower leg, ankle, and foot with return proximally,  Use of Aquaphor due to dryness.   Educated in self massage to abdominal areas, Both inguinal areas, thigh, and remaining Lower extremities within reach.    Kinesio tape was applied to the Bilateral lower extremities in a "W" application with oscillations from proximal to distal and distal to proximal and a basketweave application on the " lateral surface of the lower leg to increase lymph uptake and direct lymph flow.     MULTILAYERED BANDAGING:  issued supplies and bandaged Bilateral Lower Extremities with cotton stockinette, cotton cast padding, Lopress compression bandages 6 cm, 8cm, 10 cm applied from the base of the toes to the popliteal fold applied in a figure 8 pattern.  Instructed to leave intact 12-48 hours as tolerated, discontinue with any problems, return rolled bandages next session.     Home Exercises Provided and Patient Education Provided     Education provided:      PATIENT/FAMILY Education: bandaging wear schedule,  Home Exercise Program,  Beginning of self massage,  Self or assisted bandaging, compression options, and Risk reduction    Written Home Exercises Provided:   Hi demonstrated good  understanding of the education provided.      Lower Extremity Girth Measurements (in centimeters)  LANDMARK  Right Lower Extremity  Left Lower Extremity    Superior border of the Patella +10 49.5 49   Knee 43.5 42   40 cm  40 38   30 cm 39 40.5   20 cm  27.5 28.5   10 cm  26 26.8   Malleolus 28.5 27   Ankle- heel to dorsum of foot 35.5 36   Arch 24.5 24   Total Girth Measurement  314 311.8   Total Girth Difference 2.2     Total Girth Reduction 8.1  4.5    Bilateral Limb Involvement  Moderate- 10-cm-15 cm TGD or < 5 cm GGD  Moderate/Severe- 15.1-20 cm TGD or 5.1 cm -10 cm GGD  Severe => 20 cm TGD or >10 cm GGD    Assessment   He arrived to therapy with his compression bandages with him.  He has darkening on the Bilateral lower extremities that is gradually lightening.  The areas of the skin that are darkened is immobile but becoming more supple.  The skin is dry and flaky. Kinesio tape is being applied to the Bilateral lower extremities as a manual technique to increase lymph uptake and direct lymph flow.  He was agreeable to having his legs wrapped with compression bandages.  Hi Is progressing well towards his goals.   Patient  prognosis is Good.     Patient will continue to benefit from skilled outpatient occupational therapy to address the deficits listed in the problem list box on initial evaluation, provide pt/family education and to maximize pt's level of independence in the home and community environment.     Patient's spiritual, cultural and educational needs considered and pt agreeable to plan of care and goals.     Anticipated barriers to occupational therapy: none    Goals:      Short Term Goals: (4 weeks)  In Progress  Patient to be Independent and compliant with Home Exercise Program to increase lymphatic flow.  Partially met  Decrease girth in Bilateral lower extremities by 6 cm for improved functional use of Bilateral Lower Extremity.  In Progress  Patient will demonstrate 100% knowledge of lymphedema precautions and signs of infection.   In Progress  Patient will perform self-bandaging techniques.  In Progress  Patient will perform self lymph drainage techniques to increase lymph uptake and direct lymph flow.  In Progress  Patient will tolerate daily activities with multilayered bandaging or compression garment.  In Progress  Skin integrity improve to Good, skin will be superficially hydrated, fungal infection will resolve, color will fade to pink and temperature will be room temperature.     Long Term Goals: (8 weeks)  In Progress  Decrease girth in Bilateral lower extremities by 10 cm for improved functional use of Bilateral Lower Extremity.  In Progress  Patient will show reduction in girth to mild or less with improved contour of limb.  In Progress  Patient to mylene/doff compression garment with daily compliance.   In Progress  Patient will demonstrate the ability to independently manage condition by discharge.     Plan      Patient to be seen 1-2 x per week for 90 days for the medically necessary treatments to include: decongestive massage- Manual lymphatic drainage, Kinesio tape, skin care, multi layered bandaging,  education in lymphedema precautions, self massage, self bandaging, and assistance in obtaining appropriate compression garment.  Therapeutic exercise, postural correction, and progression of Home Exercise Program.       STANTON Blanton, CLT

## 2023-01-06 NOTE — TELEPHONE ENCOUNTER
Patient pneumatic compression pump approved and shipped by [facility] on 12/29/22.

## 2023-01-09 ENCOUNTER — CLINICAL SUPPORT (OUTPATIENT)
Dept: REHABILITATION | Facility: HOSPITAL | Age: 53
End: 2023-01-09
Payer: COMMERCIAL

## 2023-01-09 DIAGNOSIS — I89.0 LYMPHEDEMA OF BOTH LOWER EXTREMITIES: Primary | ICD-10-CM

## 2023-01-09 PROCEDURE — 97140 MANUAL THERAPY 1/> REGIONS: CPT | Mod: PN

## 2023-01-09 NOTE — PROGRESS NOTES
Occupational Therapy Daily Treatment Note     Name: Hi CORREIA [facility]  Clinic Number: [id number]    Therapy Diagnosis:   Encounter Diagnosis   Name Primary?    Lymphedema of both lower extremities Yes     Physician: Hal Swift MD*    Visit Date: 1/9/2023  Physician Orders: Evaluation and treatment for Lymphedema  Medical Diagnosis: I89.0 (ICD-10-CM) - Lymphedema of both lower extremities  Evaluation Date: 12/13/2022  Insurance Authorization period Expiration: 12/16/2022-12/31/2022,1/1/2023-6/30/2023  Plan of Care Certification Period: 12/13/2022-03/13/2023     Visit # / Visits Authortized: 7/ 10  Time In:8:30  Time Out: 9:30  Total Billable Time: 60 minutes-Manual therapy     Precautions: Standard  Subjective     Patient reports: That his legs are lightening up and are becoming less tight.  Functional change: none    Pain: 3/10  Location: bilateral lower legs     Objective     Response to previous treatment: He was able to wear the compression bandages as recommended.  The skin is flaking and the dark skin is sloughing off.  The skin is becoming more supple there is still some areas that lack skin mobility in any direction where the darkness is present last session.      Treatment:   Hi received the following manual therapy techniques:- Manual Lymphatic Drainage was performed and compression bandages and kinesio tape was applied to the: Bilateral lower extremities  for 60 minutes.    MANUAL LYMPHATIC DRAINAGE:    While seated with both legs in a dependent position drainage of entire Lower Extremities especially lower leg, ankle, and foot with return proximally,  Use of Aquaphor due to dryness.   Educated in self massage to abdominal areas, Both inguinal areas, thigh, and remaining Lower extremities within reach.    MULTILAYERED BANDAGING:  issued supplies and bandaged Bilateral Lower Extremities with cotton stockinette, cotton cast padding, Lopress compression bandages 6 cm, 8cm, 10 cm applied from  the base of the toes to the popliteal fold applied in a figure 8 pattern.  Instructed to leave intact 12-48 hours as tolerated, discontinue with any problems, return rolled bandages next session.     Home Exercises Provided and Patient Education Provided     Education provided:      PATIENT/FAMILY Education: bandaging wear schedule,  Home Exercise Program,  Beginning of self massage,  Self or assisted bandaging, compression options, and Risk reduction    Written Home Exercises Provided:   Hi demonstrated good  understanding of the education provided.      Assessment   He arrived to therapy with his compression bandages with him.  He has darkening on the Bilateral lower extremities that is gradually lightening.  The areas of the skin that are darkened is immobile but becoming more supple.  The skin is dry and flaky. Kinesio tape is being applied to the Bilateral lower extremities as a manual technique to increase lymph uptake and direct lymph flow.  He was agreeable to having his legs wrapped with compression bandages.  Hi Is progressing well towards his goals.   Patient prognosis is Good.     Patient will continue to benefit from skilled outpatient occupational therapy to address the deficits listed in the problem list box on initial evaluation, provide pt/family education and to maximize pt's level of independence in the home and community environment.     Patient's spiritual, cultural and educational needs considered and pt agreeable to plan of care and goals.     Anticipated barriers to occupational therapy: none    Goals:      Short Term Goals: (4 weeks)  In Progress  Patient to be Independent and compliant with Home Exercise Program to increase lymphatic flow.  Partially met  Decrease girth in Bilateral lower extremities by 6 cm for improved functional use of Bilateral Lower Extremity.  In Progress  Patient will demonstrate 100% knowledge of lymphedema precautions and signs of infection.   In  Progress  Patient will perform self-bandaging techniques.  In Progress  Patient will perform self lymph drainage techniques to increase lymph uptake and direct lymph flow.  In Progress  Patient will tolerate daily activities with multilayered bandaging or compression garment.  In Progress  Skin integrity improve to Good, skin will be superficially hydrated, fungal infection will resolve, color will fade to pink and temperature will be room temperature.     Long Term Goals: (8 weeks)  In Progress  Decrease girth in Bilateral lower extremities by 10 cm for improved functional use of Bilateral Lower Extremity.  In Progress  Patient will show reduction in girth to mild or less with improved contour of limb.  In Progress  Patient to mylene/doff compression garment with daily compliance.   In Progress  Patient will demonstrate the ability to independently manage condition by discharge.     Plan      Patient to be seen 1-2 x per week for 90 days for the medically necessary treatments to include: decongestive massage- Manual lymphatic drainage, Kinesio tape, skin care, multi layered bandaging, education in lymphedema precautions, self massage, self bandaging, and assistance in obtaining appropriate compression garment.  Therapeutic exercise, postural correction, and progression of Home Exercise Program.       STANTON Blanton, CLT

## 2023-01-12 ENCOUNTER — CLINICAL SUPPORT (OUTPATIENT)
Dept: REHABILITATION | Facility: HOSPITAL | Age: 53
End: 2023-01-12
Payer: COMMERCIAL

## 2023-01-12 DIAGNOSIS — I89.0 LYMPHEDEMA OF BOTH LOWER EXTREMITIES: Primary | ICD-10-CM

## 2023-01-12 PROCEDURE — 97140 MANUAL THERAPY 1/> REGIONS: CPT | Mod: PN

## 2023-01-12 NOTE — PROGRESS NOTES
"  Occupational Therapy Daily Treatment Note     Name: Hi CORREIA [facility]  Clinic Number: [id number]    Therapy Diagnosis:   Encounter Diagnosis   Name Primary?    Lymphedema of both lower extremities Yes     Physician: Hla Swift MD*    Visit Date: 1/12/2023  Physician Orders: Evaluation and treatment for Lymphedema  Medical Diagnosis: I89.0 (ICD-10-CM) - Lymphedema of both lower extremities  Evaluation Date: 12/13/2022  Insurance Authorization period Expiration: 12/16/2022-12/31/2022,1/1/2023-6/30/2023  Plan of Care Certification Period: 12/13/2022-03/13/2023     Visit # / Visits Authortized: 8/ 10  Time In:8:30  Time Out: 9:30  Total Billable Time: 60 minutes-Manual therapy     Precautions: Standard  Subjective     Patient reports: That his legs are finally lightening up and are becoming less tight.  Functional change: none    Pain: 3/10  Location: bilateral lower legs     Objective     Response to previous treatment: He was able to wear the compression bandages as recommended.  The skin is flaking and the dark skin is sloughing off.  The skin is becoming more supple there is still some areas that lack skin mobility in any direction where the darkness is present last session.      Treatment:   Hi received the following manual therapy techniques:- Manual Lymphatic Drainage was performed and compression bandages and kinesio tape was applied to the: Bilateral lower extremities  for 60 minutes.    MANUAL LYMPHATIC DRAINAGE:    While seated with both legs in a dependent position drainage of entire Lower Extremities especially lower leg, ankle, and foot with return proximally,  Use of Aquaphor due to dryness.   Educated in self massage to abdominal areas, Both inguinal areas, thigh, and remaining Lower extremities within reach.    Kinesio tape was applied to the Bilateral lower extremities in a "W" application with oscillations from proximal to distal and distal to proximal to increase lymph uptake and " direct lymph flow.     MULTILAYERED BANDAGING:  issued supplies and bandaged Bilateral Lower Extremities with cotton stockinette, cotton cast padding, Lopress compression bandages 6 cm, 8cm, 10 cm applied from the base of the toes to the popliteal fold applied in a figure 8 pattern.  Instructed to leave intact 12-48 hours as tolerated, discontinue with any problems, return rolled bandages next session.     Home Exercises Provided and Patient Education Provided     Education provided:      PATIENT/FAMILY Education: bandaging wear schedule,  Home Exercise Program,  Beginning of self massage,  Self or assisted bandaging, compression options, and Risk reduction    Written Home Exercises Provided:   Hi demonstrated good  understanding of the education provided.      Assessment   He arrived to therapy with his compression bandages with him.  He has darkening on the Bilateral lower extremities that is gradually lightening.  The areas of the skin that are darkened is immobile but becoming more supple.  The skin is dry and flaky. Kinesio tape is being applied to the Bilateral lower extremities as a manual technique to increase lymph uptake and direct lymph flow and to break up the discoloration on his legs.  He was agreeable to having his legs wrapped with compression bandages.  He was provided with the information to order his compression stockings Medi Plus with silicone band, closed toe, size V with 20-30 mmHG compression.  Hi Is progressing well towards his goals.   Patient prognosis is Good.     Patient will continue to benefit from skilled outpatient occupational therapy to address the deficits listed in the problem list box on initial evaluation, provide pt/family education and to maximize pt's level of independence in the home and community environment.     Patient's spiritual, cultural and educational needs considered and pt agreeable to plan of care and goals.     Anticipated barriers to occupational  therapy: none    Goals:      Short Term Goals: (4 weeks)  In Progress  Patient to be Independent and compliant with Home Exercise Program to increase lymphatic flow.  Partially met  Decrease girth in Bilateral lower extremities by 6 cm for improved functional use of Bilateral Lower Extremity.  In Progress  Patient will demonstrate 100% knowledge of lymphedema precautions and signs of infection.   In Progress  Patient will perform self-bandaging techniques.  In Progress  Patient will perform self lymph drainage techniques to increase lymph uptake and direct lymph flow.  In Progress  Patient will tolerate daily activities with multilayered bandaging or compression garment.  In Progress  Skin integrity improve to Good, skin will be superficially hydrated, fungal infection will resolve, color will fade to pink and temperature will be room temperature.     Long Term Goals: (8 weeks)  In Progress  Decrease girth in Bilateral lower extremities by 10 cm for improved functional use of Bilateral Lower Extremity.  In Progress  Patient will show reduction in girth to mild or less with improved contour of limb.  In Progress  Patient to mylene/doff compression garment with daily compliance.   In Progress  Patient will demonstrate the ability to independently manage condition by discharge.     Plan      Patient to be seen 1-2 x per week for 90 days for the medically necessary treatments to include: decongestive massage- Manual lymphatic drainage, Kinesio tape, skin care, multi layered bandaging, education in lymphedema precautions, self massage, self bandaging, and assistance in obtaining appropriate compression garment.  Therapeutic exercise, postural correction, and progression of Home Exercise Program.       STANTON Blanton, CLT

## 2023-02-10 ENCOUNTER — OFFICE VISIT (OUTPATIENT)
Dept: FAMILY MEDICINE | Facility: CLINIC | Age: 53
End: 2023-02-10
Payer: COMMERCIAL

## 2023-02-10 VITALS
DIASTOLIC BLOOD PRESSURE: 82 MMHG | HEART RATE: 73 BPM | HEIGHT: 66 IN | WEIGHT: 285.94 LBS | OXYGEN SATURATION: 96 % | RESPIRATION RATE: 18 BRPM | BODY MASS INDEX: 45.95 KG/M2 | SYSTOLIC BLOOD PRESSURE: 134 MMHG

## 2023-02-10 DIAGNOSIS — Z00.00 ROUTINE GENERAL MEDICAL EXAMINATION AT A HEALTH CARE FACILITY: Primary | ICD-10-CM

## 2023-02-10 DIAGNOSIS — E78.5 HYPERLIPIDEMIA, UNSPECIFIED HYPERLIPIDEMIA TYPE: ICD-10-CM

## 2023-02-10 DIAGNOSIS — I10 PRIMARY HYPERTENSION: ICD-10-CM

## 2023-02-10 DIAGNOSIS — Z12.5 ENCOUNTER FOR PROSTATE CANCER SCREENING: ICD-10-CM

## 2023-02-10 DIAGNOSIS — E66.01 CLASS 3 SEVERE OBESITY WITH SERIOUS COMORBIDITY AND BODY MASS INDEX (BMI) OF 50.0 TO 59.9 IN ADULT, UNSPECIFIED OBESITY TYPE: ICD-10-CM

## 2023-02-10 PROCEDURE — 99999 PR PBB SHADOW E&M-EST. PATIENT-LVL V: ICD-10-PCS | Mod: PBBFAC,,, | Performed by: STUDENT IN AN ORGANIZED HEALTH CARE EDUCATION/TRAINING PROGRAM

## 2023-02-10 PROCEDURE — 4010F ACE/ARB THERAPY RXD/TAKEN: CPT | Mod: CPTII,S$GLB,, | Performed by: STUDENT IN AN ORGANIZED HEALTH CARE EDUCATION/TRAINING PROGRAM

## 2023-02-10 PROCEDURE — 1159F PR MEDICATION LIST DOCUMENTED IN MEDICAL RECORD: ICD-10-PCS | Mod: CPTII,S$GLB,, | Performed by: STUDENT IN AN ORGANIZED HEALTH CARE EDUCATION/TRAINING PROGRAM

## 2023-02-10 PROCEDURE — 99396 PREV VISIT EST AGE 40-64: CPT | Mod: S$GLB,,, | Performed by: STUDENT IN AN ORGANIZED HEALTH CARE EDUCATION/TRAINING PROGRAM

## 2023-02-10 PROCEDURE — 99999 PR PBB SHADOW E&M-EST. PATIENT-LVL V: CPT | Mod: PBBFAC,,, | Performed by: STUDENT IN AN ORGANIZED HEALTH CARE EDUCATION/TRAINING PROGRAM

## 2023-02-10 PROCEDURE — 3008F BODY MASS INDEX DOCD: CPT | Mod: CPTII,S$GLB,, | Performed by: STUDENT IN AN ORGANIZED HEALTH CARE EDUCATION/TRAINING PROGRAM

## 2023-02-10 PROCEDURE — 99396 PR PREVENTIVE VISIT,EST,40-64: ICD-10-PCS | Mod: S$GLB,,, | Performed by: STUDENT IN AN ORGANIZED HEALTH CARE EDUCATION/TRAINING PROGRAM

## 2023-02-10 PROCEDURE — 3075F PR MOST RECENT SYSTOLIC BLOOD PRESS GE 130-139MM HG: ICD-10-PCS | Mod: CPTII,S$GLB,, | Performed by: STUDENT IN AN ORGANIZED HEALTH CARE EDUCATION/TRAINING PROGRAM

## 2023-02-10 PROCEDURE — 1159F MED LIST DOCD IN RCRD: CPT | Mod: CPTII,S$GLB,, | Performed by: STUDENT IN AN ORGANIZED HEALTH CARE EDUCATION/TRAINING PROGRAM

## 2023-02-10 PROCEDURE — 3079F PR MOST RECENT DIASTOLIC BLOOD PRESSURE 80-89 MM HG: ICD-10-PCS | Mod: CPTII,S$GLB,, | Performed by: STUDENT IN AN ORGANIZED HEALTH CARE EDUCATION/TRAINING PROGRAM

## 2023-02-10 PROCEDURE — 1160F RVW MEDS BY RX/DR IN RCRD: CPT | Mod: CPTII,S$GLB,, | Performed by: STUDENT IN AN ORGANIZED HEALTH CARE EDUCATION/TRAINING PROGRAM

## 2023-02-10 PROCEDURE — 3008F PR BODY MASS INDEX (BMI) DOCUMENTED: ICD-10-PCS | Mod: CPTII,S$GLB,, | Performed by: STUDENT IN AN ORGANIZED HEALTH CARE EDUCATION/TRAINING PROGRAM

## 2023-02-10 PROCEDURE — 1160F PR REVIEW ALL MEDS BY PRESCRIBER/CLIN PHARMACIST DOCUMENTED: ICD-10-PCS | Mod: CPTII,S$GLB,, | Performed by: STUDENT IN AN ORGANIZED HEALTH CARE EDUCATION/TRAINING PROGRAM

## 2023-02-10 PROCEDURE — 3075F SYST BP GE 130 - 139MM HG: CPT | Mod: CPTII,S$GLB,, | Performed by: STUDENT IN AN ORGANIZED HEALTH CARE EDUCATION/TRAINING PROGRAM

## 2023-02-10 PROCEDURE — 3079F DIAST BP 80-89 MM HG: CPT | Mod: CPTII,S$GLB,, | Performed by: STUDENT IN AN ORGANIZED HEALTH CARE EDUCATION/TRAINING PROGRAM

## 2023-02-10 PROCEDURE — 4010F PR ACE/ARB THEARPY RXD/TAKEN: ICD-10-PCS | Mod: CPTII,S$GLB,, | Performed by: STUDENT IN AN ORGANIZED HEALTH CARE EDUCATION/TRAINING PROGRAM

## 2023-02-16 NOTE — PROGRESS NOTES
"[facility] CLINIC NOTE    Patient Name: Hi Andres  YOB: 1970    PRESENTING HISTORY     History of Present Illness:  Mr. Hi Andres is a 52 y.o. male here for routine physical.     He has had issues with chronic lymphedema and associated trophic changes. Seeing Cardiology and Lymphedema clinic.     He continues to gradually lose weight through lifestyle changes. Doesn't want to pursue anything more aggressive right now. Highest weight 323. Currently 285.     HTN is well controlled.     On urate lowering therapy and statin.     Reviewed cancer screenings and due for PSA.    ROS      OBJECTIVE:   Vital Signs:  Vitals:    02/10/23 1035   BP: 134/82   Pulse: 73   Resp: 18   SpO2: 96%   Weight: 129.7 kg (285 lb 15 oz)   Height: 5' 6" (1.676 m)          Physical Exam: Normal, no change.     Physical Exam    ASSESSMENT & PLAN:     Routine general medical examination at a health care facility    Hyperlipidemia, unspecified hyperlipidemia type  -     Comprehensive Metabolic Panel; Future; Expected date: 02/10/2023  Continue current medications    Encounter for prostate cancer screening  -     PSA, SCREENING; Future; Expected date: 02/10/2023    Class 3 severe obesity with serious comorbidity and body mass index (BMI) of 50.0 to 59.9 in adult, unspecified obesity type  -     Hemoglobin A1C; Future; Expected date: 02/10/2023  Steady weight loss, seems sustainable. Continue    Primary hypertension  Continue current medications             Johan Chamberlain MD   Internal Medicine            "

## 2023-04-20 ENCOUNTER — OFFICE VISIT (OUTPATIENT)
Dept: CARDIOLOGY | Facility: CLINIC | Age: 53
End: 2023-04-20
Payer: COMMERCIAL

## 2023-04-20 ENCOUNTER — TELEPHONE (OUTPATIENT)
Dept: BARIATRICS | Facility: CLINIC | Age: 53
End: 2023-04-20
Payer: COMMERCIAL

## 2023-04-20 VITALS
DIASTOLIC BLOOD PRESSURE: 66 MMHG | SYSTOLIC BLOOD PRESSURE: 125 MMHG | BODY MASS INDEX: 48.7 KG/M2 | WEIGHT: 292.31 LBS | HEIGHT: 65 IN | HEART RATE: 70 BPM

## 2023-04-20 DIAGNOSIS — M79.3 LIPODERMATOSCLEROSIS OF BOTH LOWER EXTREMITIES: ICD-10-CM

## 2023-04-20 DIAGNOSIS — R60.0 EDEMA OF BOTH LOWER EXTREMITIES: ICD-10-CM

## 2023-04-20 DIAGNOSIS — M79.662 PAIN IN BOTH LOWER LEGS: Primary | ICD-10-CM

## 2023-04-20 DIAGNOSIS — I10 PRIMARY HYPERTENSION: ICD-10-CM

## 2023-04-20 DIAGNOSIS — E78.2 MIXED HYPERLIPIDEMIA: ICD-10-CM

## 2023-04-20 DIAGNOSIS — E66.01 MORBID OBESITY: ICD-10-CM

## 2023-04-20 DIAGNOSIS — I73.9 CLAUDICATION OF BOTH LOWER EXTREMITIES: ICD-10-CM

## 2023-04-20 DIAGNOSIS — M79.661 PAIN IN BOTH LOWER LEGS: Primary | ICD-10-CM

## 2023-04-20 DIAGNOSIS — I89.0 LYMPHEDEMA OF BOTH LOWER EXTREMITIES: ICD-10-CM

## 2023-04-20 DIAGNOSIS — G47.33 OSA ON CPAP: ICD-10-CM

## 2023-04-20 DIAGNOSIS — I87.2 VENOUS STASIS DERMATITIS OF BOTH LOWER EXTREMITIES: ICD-10-CM

## 2023-04-20 PROCEDURE — 3078F DIAST BP <80 MM HG: CPT | Mod: CPTII,S$GLB,, | Performed by: INTERNAL MEDICINE

## 2023-04-20 PROCEDURE — 3078F PR MOST RECENT DIASTOLIC BLOOD PRESSURE < 80 MM HG: ICD-10-PCS | Mod: CPTII,S$GLB,, | Performed by: INTERNAL MEDICINE

## 2023-04-20 PROCEDURE — 3008F BODY MASS INDEX DOCD: CPT | Mod: CPTII,S$GLB,, | Performed by: INTERNAL MEDICINE

## 2023-04-20 PROCEDURE — 1159F PR MEDICATION LIST DOCUMENTED IN MEDICAL RECORD: ICD-10-PCS | Mod: CPTII,S$GLB,, | Performed by: INTERNAL MEDICINE

## 2023-04-20 PROCEDURE — 99215 PR OFFICE/OUTPT VISIT, EST, LEVL V, 40-54 MIN: ICD-10-PCS | Mod: S$GLB,,, | Performed by: INTERNAL MEDICINE

## 2023-04-20 PROCEDURE — 99999 PR PBB SHADOW E&M-EST. PATIENT-LVL V: CPT | Mod: PBBFAC,,, | Performed by: INTERNAL MEDICINE

## 2023-04-20 PROCEDURE — 3074F SYST BP LT 130 MM HG: CPT | Mod: CPTII,S$GLB,, | Performed by: INTERNAL MEDICINE

## 2023-04-20 PROCEDURE — 1159F MED LIST DOCD IN RCRD: CPT | Mod: CPTII,S$GLB,, | Performed by: INTERNAL MEDICINE

## 2023-04-20 PROCEDURE — 99999 PR PBB SHADOW E&M-EST. PATIENT-LVL V: ICD-10-PCS | Mod: PBBFAC,,, | Performed by: INTERNAL MEDICINE

## 2023-04-20 PROCEDURE — 3008F PR BODY MASS INDEX (BMI) DOCUMENTED: ICD-10-PCS | Mod: CPTII,S$GLB,, | Performed by: INTERNAL MEDICINE

## 2023-04-20 PROCEDURE — 4010F ACE/ARB THERAPY RXD/TAKEN: CPT | Mod: CPTII,S$GLB,, | Performed by: INTERNAL MEDICINE

## 2023-04-20 PROCEDURE — 99215 OFFICE O/P EST HI 40 MIN: CPT | Mod: S$GLB,,, | Performed by: INTERNAL MEDICINE

## 2023-04-20 PROCEDURE — 4010F PR ACE/ARB THEARPY RXD/TAKEN: ICD-10-PCS | Mod: CPTII,S$GLB,, | Performed by: INTERNAL MEDICINE

## 2023-04-20 PROCEDURE — 3074F PR MOST RECENT SYSTOLIC BLOOD PRESSURE < 130 MM HG: ICD-10-PCS | Mod: CPTII,S$GLB,, | Performed by: INTERNAL MEDICINE

## 2023-04-20 NOTE — TELEPHONE ENCOUNTER
Phoned patient after reviewing the chart and saw where pt had spoken to Kendrick regarding a NP consult.  Informed patient that he does not have coverage through his wife's insurance with [employer] unless he works for the state also.  Read to him the insurance notes from the  as follows    1. The Plan Participant is required to be either an Employee or Retiree. The Bariatric Surgery Benefit is available for Dependents if one of the following criteria is met:  a. The Dependent is also an Employee; or  b. The Dependent is a Retiree. He stated that he did not work for the state and advised him to call [employer] himself to verify.  He also stated that he was going to try to lose the weight himself.  He verbalized understanding.

## 2023-04-20 NOTE — TELEPHONE ENCOUNTER
----- Message from Anne Ames sent at 4/20/2023 10:44 AM CDT -----  Regarding: np refferal appt requested  Name of Who is Calling:RAO FISH [[id number]]          What is the request in detail: np referral request           Can the clinic reply by [facility]: no           [facility] [phone] (home) [phone] (work)

## 2023-04-20 NOTE — PROGRESS NOTES
[facility]      Chief Complaint   Patient presents with    Pain in both lower legs       Patient ID: Hi Andres is a 52 y.o. male with HTN, HLD, EVGENY (on CPAP), morbid obesity, who presents for a follow up appointment.  Pertinent history/events are as follows:     -Pt kindly referred by Dariela Rivera NP for evaluation of Pain in both lower extremities.    -At our initial clinic visit on 12/9/2022, Mr. Andres reported pain in both legs which started 2 weeks ago.  Pain occurs at rest and with ambulation.  He reports no LE wounds/ulcers.  Exam shows BLE's with trace to 1 pitting edema, venous stasis dermatitis, lipodermatosclerosis, and changes consistent with lymphedema.    Plan:   Bilateral Leg Pain- Etiology likely multifactorial with contribution from chronic venous insufficiency with lipodermatosclerosis and possible PAD.  Check BLE venous reflux study , exercise ANA study, BLE arterial ultrasound, and echo.  Refer to lymphedema clinic.  Pt to limit sodium intake to 2,000 mg daily.  Limit volume intake to 1.5 liters daily.  Elevate legs when resting.  Wear graduated compression hose.   HTN- Continue current medications.  HLD- Check lipids to determine ASCVD risk score.  Morbid Obesity- Refer to Bariatric Medicine for evaluation.    HPI:  Mr. Andres reports significant improvement in BLE edema.  Has completed lymphedema clinic therapy.  Continue to perform lymphedema clinic techniques at home.   Echo on 12/19/2022 with EF 55%; indeterminate left ventricular diastolic function; mild left atrial enlargement; mild tricuspid regurgitation; estimated PA systolic pressure is 28 mmHg; normal central venous pressure (3 mmHg).  ANA Study on 12/19/2022 demonstrated normal resting and exercise ABIs bilaterally.  BLE Arterial Ultrasound on 12/19/2022 with right resting ANA 1.1, is normal. Left resting ANA 1.1, is normal.  Bilateral lower extremity arteries with no evidence of hemodynamically  significant stenosis.  BLE Venous Reflux Study on 12/19/2022 revealed subcutaneous edema in the bilateral calf veins.  There is no DVT and no hemodynamically significant venous reflux.:    Past Medical History:   Diagnosis Date    Anemia     Arthritis     Broken foot     Left side (fell down a ladder)    Gout     Hernia, abdominal     Hyperlipidemia     Hypertension     EVGENY on CPAP     Vitamin D deficiency      Past Surgical History:   Procedure Laterality Date    COLONOSCOPY N/A 7/23/2021    Procedure: COLONOSCOPY;  Surgeon: Emily Valente MD;  [facility]: [facility];  Service: Endoscopy;  Laterality: N/A;    HERNIA REPAIR       Social History     Socioeconomic History    Marital status:    Tobacco Use    Smoking status: Never    Smokeless tobacco: Never   Substance and Sexual Activity    Alcohol use: Yes     Comment: monthly    Drug use: No    Sexual activity: Yes     Partners: Female   Social History Narrative    Live with wife     Family History   Problem Relation Age of Onset    Arthritis Father     Hypertension Father     Diabetes Father     No Known Problems Mother     Prostate cancer Neg Hx     Colon cancer Neg Hx        Review of patient's allergies indicates:  No Known Allergies    Medication List with Changes/Refills   Current Medications    ALLOPURINOL (ZYLOPRIM) 300 MG TABLET    TAKE 1 TABLET DAILY    AMLODIPINE (NORVASC) 10 MG TABLET        AMMONIUM LACTATE (LAC-HYDRIN) 12 % LOTION    Apply topically as needed for Dry Skin.    ASCORBIC ACID, VITAMIN C, (VITAMIN C) 1000 MG TABLET    Take 1,000 mg by mouth once daily.    ASPIRIN (ECOTRIN) 81 MG EC TABLET    Take 81 mg by mouth once daily.    CICLOPIROX (PENLAC) 8 % SOLN    ?Apply nail lacquer 8% solution once daily to affected nails with applicator brush, preferably at bedtime or 8 hours before washing; remove with alcohol every 7 days    ERGOCALCIFEROL (ERGOCALCIFEROL) 50,000 UNIT CAP    TAKE 1 CAPSULE EVERY 7 DAYS    HYDROCHLOROTHIAZIDE  (HYDRODIURIL) 12.5 MG TAB    TAKE 1 TABLET DAILY    METHYLPREDNISOLONE (MEDROL) 4 MG TAB    Take 4 mg by mouth once daily.    METOPROLOL SUCCINATE (TOPROL-XL) 100 MG 24 HR TABLET    TAKE 1 TABLET DAILY    MOMETASONE (ELOCON) 0.1 % OINTMENT    Apply topically once daily.    MULTIVITAMIN (MULTIPLE VITAMINS ORAL)    Take 1 tablet by mouth once daily.    OMEGA-3 FATTY ACIDS-FISH -880 MG CAP    Take 1 capsule by mouth once daily.    TADALAFIL (CIALIS) 5 MG TABLET    Take 1 tablet by mouth once daily    TRIAMCINOLONE ACETONIDE 0.1% (KENALOG) 0.1 % OINTMENT    Apply topically 2 (two) times daily.    VALSARTAN (DIOVAN) 320 MG TABLET    TAKE 1 TABLET DAILY WITH FOOD       Review of Systems  Constitution: Denies chills, fever, and sweats.  HENT: Denies headaches or blurry vision.  Cardiovascular: Denies chest pain or irregular heart beat.  Respiratory: Denies cough or shortness of breath.  Gastrointestinal: Denies abdominal pain, nausea, or vomiting.  Musculoskeletal: Denies muscle cramps.  Neurological: Denies dizziness or focal weakness.  Psychiatric/Behavioral: Normal mental status.  Hematologic/Lymphatic: Denies bleeding problem or easy bruising/bleeding.  Skin: Denies rash or suspicious lesions    Physical Examination  There were no vitals taken for this visit.    Constitutional: No acute distress, conversant  HEENT: Sclera anicteric, Pupils equal, round and reactive to light, extraocular motions intact, Oropharynx clear  Neck: No JVD, no carotid bruits  Cardiovascular: regular rate and rhythm, no murmur, rubs or gallops, normal S1/S2  Pulmonary: Clear to auscultation bilaterally  Abdominal: Abdomen soft, nontender, nondistended, positive bowel sounds  Extremities: BLE's with trace to 1 pitting edema, venous stasis dermatitis, lipodermatosclerosis, and changes consistent with lymphedema    Pulses:  Carotid pulses are 2+ on the right side, and 2+ on the left side.  Radial pulses are 2+ on the right side, and 2+ on  the left side.   Femoral pulses are 2+ on the right side, and 2+ on the left side.  Popliteal pulses are 2+ on the right side, and 2+ on the left side.   Dorsalis pedis pulses are 2+ on the right side, and 2+ on the left side.   Posterior tibial pulses are 2+ on the right side, and 2+ on the left side.    Skin: No ecchymosis, erythema, or ulcers  Psych: Alert and oriented x 3, appropriate affect  Neuro: CNII-XII intact, no focal deficits    Labs:  Most Recent Data  CBC:   Lab Results   Component Value Date    WBC 6.29 09/12/2022    HGB 11.4 (L) 09/12/2022    HCT 35.3 (L) 09/12/2022     09/12/2022    MCV 93 09/12/2022    RDW 13.7 09/12/2022     BMP:   Lab Results   Component Value Date     09/12/2022    K 3.7 09/12/2022     09/12/2022    CO2 25 09/12/2022    BUN 14 09/12/2022    CREATININE 0.9 09/12/2022    GLU 88 09/12/2022    CALCIUM 8.9 09/12/2022     LFTS;   Lab Results   Component Value Date    PROT 7.0 09/12/2022    ALBUMIN 3.6 09/12/2022    BILITOT 0.6 09/12/2022    AST 21 09/12/2022    ALKPHOS 99 09/12/2022    ALT 23 09/12/2022     COAGS: No results found for: INR, PROTIME, PTT  FLP:   Lab Results   Component Value Date    CHOL 183 09/12/2022    HDL 34 (L) 09/12/2022    LDLCALC 119.0 09/12/2022    TRIG 150 09/12/2022    CHOLHDL 18.6 (L) 09/12/2022     CARDIAC: No results found for: TROPONINI, CKMB, BNP    Imaging:    Echo 12/19/2022:  The left ventricle is normal in size with normal systolic function. The estimated ejection fraction is 55%.  Indeterminate left ventricular diastolic function.  Normal right ventricular size with normal right ventricular systolic function.  Mild left atrial enlargement.  Mild tricuspid regurgitation.  The estimated PA systolic pressure is 28 mmHg.  Normal central venous pressure (3 mmHg).    ANA Study 12/19/2022:  Normal resting and exercise ABIs bilaterally.  PVR waveforms are moderately dampened at the low thigh level bilaterally.  PVR waveforms are mildly  dampened at the calf and ankle level on the left.     BLE Arterial Ultrasound 12/19/2022:  Right resting ANA 1.1, is normal.  Left resting ANA 1.1, is normal.  Bilateral lower extremity arteries with no evidence of hemodynamically significant stenosis.    BLE Venous Reflux Study 12/19/2022:  There is no evidence of a right lower extremity DVT.  There is subcutaneous edema located in the right proximal calf and distal calf veins.  There is no evidence of a left lower extremity DVT.  There is subcutaneous edema located in the left proximal calf and distal calf veins.    BLE Venous Reflux Study 6/18/2021:  Doppler ultrasound evidence of valvular insufficiency and venous reflux in the left greater saphenous vein.    Assessment/Plan:  Hi Andres is a 52 y.o. male with HTN, HLD, EVGENY (on CPAP), morbid obesity, who presents for a follow up appointment.    Bilateral Leg Pain- Largely due to chronic venous insufficiency with lipodermatosclerosis.  No evidence of hemodynamically significant PAD.  Leg pain has significantly improved following lymphedema clinic therapy.  Continue lymphedema clinic techniques home.  Pt to limit sodium intake to 2,000 mg daily.  Limit volume intake to 1.5 liters daily.  Elevate legs when resting.  Wear graduated compression hose.     2. HTN- Continue current medications.    3. HLD- Encourage diet, exercise, and weight loss..    4. Morbid Obesity- Refer to Bariatric Medicine for evaluation.    Follow up in 6 months     Total duration of face to face visit time 30 minutes.  Total time spent counseling greater than fifty percent of total visit time.  Counseling included discussion regarding imaging findings, diagnosis, possibilities, treatment options, risks and benefits.  The patient had many questions regarding the options and long-term effects.    Hal Swift MD, PhD  Interventional Cardiology

## 2023-04-20 NOTE — TELEPHONE ENCOUNTER
Attempted to reach pt in regard to referral for bariatric services. Noticed pt had a [facility] phone call appt in the past for another bariatric clinic and is possibly a [facility] pt.       Per [facility] appt notes on 1/5-  Pt will be advised that [employer] [place] has filled waiting list for 2023, and 2024 wait list is now available. P[facility] advised pt will be placed on the [employer] [place] 2024 waiting list if space is available

## 2023-04-20 NOTE — TELEPHONE ENCOUNTER
----- Message from Anne Ames sent at 4/20/2023 10:44 AM CDT -----  Regarding: np refferal appt requested  Name of Who is Calling:RAO FISH [[id number]]          What is the request in detail: np referral request           Can the clinic reply by [facility]: no           What Number to Call Back [facility]: [phone] (home) [phone] (work)

## 2023-04-24 ENCOUNTER — LAB VISIT (OUTPATIENT)
Dept: LAB | Facility: HOSPITAL | Age: 53
End: 2023-04-24
Attending: INTERNAL MEDICINE
Payer: COMMERCIAL

## 2023-04-24 DIAGNOSIS — M79.662 PAIN IN BOTH LOWER LEGS: ICD-10-CM

## 2023-04-24 DIAGNOSIS — M79.661 PAIN IN BOTH LOWER LEGS: ICD-10-CM

## 2023-04-24 DIAGNOSIS — E78.5 HYPERLIPIDEMIA, UNSPECIFIED HYPERLIPIDEMIA TYPE: ICD-10-CM

## 2023-04-24 LAB
ALBUMIN SERPL BCP-MCNC: 3.5 G/DL (ref 3.5–5.2)
ALP SERPL-CCNC: 99 U/L (ref 55–135)
ALT SERPL W/O P-5'-P-CCNC: 17 U/L (ref 10–44)
ANION GAP SERPL CALC-SCNC: 9 MMOL/L (ref 8–16)
AST SERPL-CCNC: 16 U/L (ref 10–40)
BILIRUB SERPL-MCNC: 0.5 MG/DL (ref 0.1–1)
BUN SERPL-MCNC: 23 MG/DL (ref 6–20)
CALCIUM SERPL-MCNC: 9.4 MG/DL (ref 8.7–10.5)
CHLORIDE SERPL-SCNC: 103 MMOL/L (ref 95–110)
CHOLEST SERPL-MCNC: 188 MG/DL (ref 120–199)
CHOLEST/HDLC SERPL: 5.4 {RATIO} (ref 2–5)
CO2 SERPL-SCNC: 27 MMOL/L (ref 23–29)
CREAT SERPL-MCNC: 1.1 MG/DL (ref 0.5–1.4)
EST. GFR  (NO RACE VARIABLE): >60 ML/MIN/1.73 M^2
GLUCOSE SERPL-MCNC: 91 MG/DL (ref 70–110)
HDLC SERPL-MCNC: 35 MG/DL (ref 40–75)
HDLC SERPL: 18.6 % (ref 20–50)
LDLC SERPL CALC-MCNC: 124.4 MG/DL (ref 63–159)
NONHDLC SERPL-MCNC: 153 MG/DL
POTASSIUM SERPL-SCNC: 4.4 MMOL/L (ref 3.5–5.1)
PROT SERPL-MCNC: 7.3 G/DL (ref 6–8.4)
SODIUM SERPL-SCNC: 139 MMOL/L (ref 136–145)
TRIGL SERPL-MCNC: 143 MG/DL (ref 30–150)

## 2023-04-24 PROCEDURE — 80061 LIPID PANEL: CPT | Performed by: INTERNAL MEDICINE

## 2023-04-24 PROCEDURE — 80053 COMPREHEN METABOLIC PANEL: CPT | Performed by: STUDENT IN AN ORGANIZED HEALTH CARE EDUCATION/TRAINING PROGRAM

## 2023-04-24 PROCEDURE — 36415 COLL VENOUS BLD VENIPUNCTURE: CPT | Mod: PO | Performed by: INTERNAL MEDICINE

## 2023-07-24 DIAGNOSIS — E79.0 ELEVATED URIC ACID IN BLOOD: ICD-10-CM

## 2023-07-24 RX ORDER — ALLOPURINOL 300 MG/1
TABLET ORAL
Qty: 90 TABLET | Refills: 3 | Status: SHIPPED | OUTPATIENT
Start: 2023-07-24

## 2023-07-24 NOTE — TELEPHONE ENCOUNTER
Care Due:                  Date            Visit Type   Department     Provider  --------------------------------------------------------------------------------                                EP -                              PRIMARY      SALVATORE Myers  Last Visit: 02-      CARE ([facility])   MEDICINE       Naccari                              EP -                              PRIMARY      [facility]    Johan Myers  Next Visit: 02-      CARE ([facility])   MEDICINE       Naccari                                                            Last  Test          Frequency    Reason                     Performed    Due Date  --------------------------------------------------------------------------------    CBC.........  12 months..  allopurinoL..............  09- 09-    Uric Acid...  12 months..  allopurinoL..............  Not Found    Overdue    [employer] Embedded Care Due Messages. Reference number: [id number].   7/24/2023 12:11:14 AM CDT

## 2023-07-24 NOTE — TELEPHONE ENCOUNTER
Refill Routing Note   Medication(s) are not appropriate for processing by [facility] for the following reason(s):      Required labs outdated    [facility] action(s):  Defer Care Due:  Labs due            Appointments  past 12m or future 3m with PCP    Date Provider   Last Visit   2/10/2023 Johan Chamberlain MD   Next Visit   Visit date not found Johan Chamberlain MD   ED visits in past 90 days: 0        Note composed:3:55 AM 07/24/2023

## 2023-07-31 DIAGNOSIS — I10 ESSENTIAL HYPERTENSION: ICD-10-CM

## 2023-08-01 RX ORDER — METOPROLOL SUCCINATE 100 MG/1
TABLET, EXTENDED RELEASE ORAL
Qty: 90 TABLET | Refills: 1 | Status: SHIPPED | OUTPATIENT
Start: 2023-08-01 | End: 2024-01-29

## 2023-08-01 NOTE — TELEPHONE ENCOUNTER
Refill Routing Note   Medication(s) are not appropriate for processing by [facility] for the following reason(s):      Drug-disease interaction    [facility] action(s):  Defer Care Due:  None identified     Medication Therapy Plan: Claudication of both lower extremities; Venous stasis dermatitis of both lower extremities    Pharmacist review requested: Yes     Appointments  past 12m or future 3m with PCP    Date Provider   Last Visit   2/10/2023 Johan Chamberlain MD   Next Visit   Visit date not found Johan Chamberlain MD   ED visits in past 90 days: 0        Note composed:7:24 AM 08/01/2023

## 2023-08-01 NOTE — TELEPHONE ENCOUNTER
Refill Decision Note   Hi Andres  is requesting a refill authorization.  Brief Assessment and Rationale for Refill:  Approve     Medication Therapy Plan:         Pharmacist review requested: Yes   Comments:     No Care Gaps recommended.     Note composed:8:57 AM 08/01/2023

## 2023-08-01 NOTE — TELEPHONE ENCOUNTER
No care due was identified.  [facility] Embedded Care Due Messages. Reference number: [id number].   7/31/2023 11:52:28 PM CDT

## 2023-09-15 ENCOUNTER — LAB VISIT (OUTPATIENT)
Dept: LAB | Facility: HOSPITAL | Age: 53
End: 2023-09-15
Attending: STUDENT IN AN ORGANIZED HEALTH CARE EDUCATION/TRAINING PROGRAM
Payer: COMMERCIAL

## 2023-09-15 DIAGNOSIS — Z12.5 ENCOUNTER FOR PROSTATE CANCER SCREENING: ICD-10-CM

## 2023-09-15 DIAGNOSIS — E66.01 CLASS 3 SEVERE OBESITY WITH SERIOUS COMORBIDITY AND BODY MASS INDEX (BMI) OF 50.0 TO 59.9 IN ADULT, UNSPECIFIED OBESITY TYPE: ICD-10-CM

## 2023-09-15 LAB
COMPLEXED PSA SERPL-MCNC: 0.61 NG/ML (ref 0–4)
ESTIMATED AVG GLUCOSE: 105 MG/DL (ref 68–131)
HBA1C MFR BLD: 5.3 % (ref 4–5.6)

## 2023-09-15 PROCEDURE — 83036 HEMOGLOBIN GLYCOSYLATED A1C: CPT | Performed by: STUDENT IN AN ORGANIZED HEALTH CARE EDUCATION/TRAINING PROGRAM

## 2023-09-15 PROCEDURE — 84153 ASSAY OF PSA TOTAL: CPT | Performed by: STUDENT IN AN ORGANIZED HEALTH CARE EDUCATION/TRAINING PROGRAM

## 2023-09-15 PROCEDURE — 36415 COLL VENOUS BLD VENIPUNCTURE: CPT | Mod: PO | Performed by: STUDENT IN AN ORGANIZED HEALTH CARE EDUCATION/TRAINING PROGRAM

## 2023-10-30 DIAGNOSIS — E55.9 VITAMIN D DEFICIENCY: ICD-10-CM

## 2023-10-30 RX ORDER — ERGOCALCIFEROL 1.25 MG/1
CAPSULE ORAL
Qty: 12 CAPSULE | Refills: 3 | Status: SHIPPED | OUTPATIENT
Start: 2023-10-30 | End: 2024-02-12 | Stop reason: DRUGHIGH

## 2023-10-30 NOTE — TELEPHONE ENCOUNTER
On call refill request.  Dr. Chamberlain is out of the office on vacation.  Requesting assistance with refill in Dr. Chamberlain's absence. Please advise. Thank you.     LR--11-21-22        LOV--2-10-23       FOV--2-12-24

## 2023-12-13 ENCOUNTER — OFFICE VISIT (OUTPATIENT)
Dept: CARDIOLOGY | Facility: CLINIC | Age: 53
End: 2023-12-13
Payer: COMMERCIAL

## 2023-12-13 VITALS
HEART RATE: 73 BPM | SYSTOLIC BLOOD PRESSURE: 144 MMHG | WEIGHT: 281.94 LBS | BODY MASS INDEX: 46.98 KG/M2 | HEIGHT: 65 IN | DIASTOLIC BLOOD PRESSURE: 81 MMHG

## 2023-12-13 DIAGNOSIS — M79.3 LIPODERMATOSCLEROSIS OF BOTH LOWER EXTREMITIES: ICD-10-CM

## 2023-12-13 DIAGNOSIS — M79.661 PAIN IN BOTH LOWER LEGS: ICD-10-CM

## 2023-12-13 DIAGNOSIS — I87.2 VENOUS STASIS DERMATITIS OF BOTH LOWER EXTREMITIES: ICD-10-CM

## 2023-12-13 DIAGNOSIS — E66.01 MORBID OBESITY: ICD-10-CM

## 2023-12-13 DIAGNOSIS — R60.0 EDEMA OF BOTH LOWER EXTREMITIES: ICD-10-CM

## 2023-12-13 DIAGNOSIS — I89.0 LYMPHEDEMA OF BOTH LOWER EXTREMITIES: ICD-10-CM

## 2023-12-13 DIAGNOSIS — I73.9 CLAUDICATION OF BOTH LOWER EXTREMITIES: ICD-10-CM

## 2023-12-13 DIAGNOSIS — M79.662 PAIN IN BOTH LOWER LEGS: ICD-10-CM

## 2023-12-13 DIAGNOSIS — E78.2 MIXED HYPERLIPIDEMIA: ICD-10-CM

## 2023-12-13 DIAGNOSIS — I10 PRIMARY HYPERTENSION: Primary | ICD-10-CM

## 2023-12-13 PROCEDURE — 3008F PR BODY MASS INDEX (BMI) DOCUMENTED: ICD-10-PCS | Mod: CPTII,S$GLB,, | Performed by: INTERNAL MEDICINE

## 2023-12-13 PROCEDURE — 99215 PR OFFICE/OUTPT VISIT, EST, LEVL V, 40-54 MIN: ICD-10-PCS | Mod: S$GLB,,, | Performed by: INTERNAL MEDICINE

## 2023-12-13 PROCEDURE — 1159F MED LIST DOCD IN RCRD: CPT | Mod: CPTII,S$GLB,, | Performed by: INTERNAL MEDICINE

## 2023-12-13 PROCEDURE — 3044F HG A1C LEVEL LT 7.0%: CPT | Mod: CPTII,S$GLB,, | Performed by: INTERNAL MEDICINE

## 2023-12-13 PROCEDURE — 4010F ACE/ARB THERAPY RXD/TAKEN: CPT | Mod: CPTII,S$GLB,, | Performed by: INTERNAL MEDICINE

## 2023-12-13 PROCEDURE — 3044F PR MOST RECENT HEMOGLOBIN A1C LEVEL <7.0%: ICD-10-PCS | Mod: CPTII,S$GLB,, | Performed by: INTERNAL MEDICINE

## 2023-12-13 PROCEDURE — 3008F BODY MASS INDEX DOCD: CPT | Mod: CPTII,S$GLB,, | Performed by: INTERNAL MEDICINE

## 2023-12-13 PROCEDURE — 1159F PR MEDICATION LIST DOCUMENTED IN MEDICAL RECORD: ICD-10-PCS | Mod: CPTII,S$GLB,, | Performed by: INTERNAL MEDICINE

## 2023-12-13 PROCEDURE — 4010F PR ACE/ARB THEARPY RXD/TAKEN: ICD-10-PCS | Mod: CPTII,S$GLB,, | Performed by: INTERNAL MEDICINE

## 2023-12-13 PROCEDURE — 99215 OFFICE O/P EST HI 40 MIN: CPT | Mod: S$GLB,,, | Performed by: INTERNAL MEDICINE

## 2023-12-13 PROCEDURE — 99999 PR PBB SHADOW E&M-EST. PATIENT-LVL IV: ICD-10-PCS | Mod: PBBFAC,,, | Performed by: INTERNAL MEDICINE

## 2023-12-13 PROCEDURE — 3077F SYST BP >= 140 MM HG: CPT | Mod: CPTII,S$GLB,, | Performed by: INTERNAL MEDICINE

## 2023-12-13 PROCEDURE — 3077F PR MOST RECENT SYSTOLIC BLOOD PRESSURE >= 140 MM HG: ICD-10-PCS | Mod: CPTII,S$GLB,, | Performed by: INTERNAL MEDICINE

## 2023-12-13 PROCEDURE — 99999 PR PBB SHADOW E&M-EST. PATIENT-LVL IV: CPT | Mod: PBBFAC,,, | Performed by: INTERNAL MEDICINE

## 2023-12-13 PROCEDURE — 3079F PR MOST RECENT DIASTOLIC BLOOD PRESSURE 80-89 MM HG: ICD-10-PCS | Mod: CPTII,S$GLB,, | Performed by: INTERNAL MEDICINE

## 2023-12-13 PROCEDURE — 3079F DIAST BP 80-89 MM HG: CPT | Mod: CPTII,S$GLB,, | Performed by: INTERNAL MEDICINE

## 2023-12-13 NOTE — PROGRESS NOTES
[facility]      Chief Complaint   Patient presents with    Pain in both lower legs       Patient ID: Hi Andres is a 53 y.o. male with HTN, HLD, EVGENY (on CPAP), morbid obesity, who presents for a follow up appointment.  Pertinent history/events are as follows:     -Pt kindly referred by Dariela Rivera NP for evaluation of Pain in both lower extremities.    -At our initial clinic visit on 12/9/2022, Mr. Andres reported pain in both legs which started 2 weeks ago.  Pain occurs at rest and with ambulation.  He reports no LE wounds/ulcers.  Exam shows BLE's with trace to 1 pitting edema, venous stasis dermatitis, lipodermatosclerosis, and changes consistent with lymphedema.    Plan:   Bilateral Leg Pain- Etiology likely multifactorial with contribution from chronic venous insufficiency with lipodermatosclerosis and possible PAD.  Check BLE venous reflux study , exercise ANA study, BLE arterial ultrasound, and echo.  Refer to lymphedema clinic.  Pt to limit sodium intake to 2,000 mg daily.  Limit volume intake to 1.5 liters daily.  Elevate legs when resting.  Wear graduated compression hose.   HTN- Continue current medications.  HLD- Check lipids to determine ASCVD risk score.  Morbid Obesity- Refer to Bariatric Medicine for evaluation.    -At follow up clinic visit on  4/20/2023, Mr. Andres reported significant improvement in BLE edema.  Has completed lymphedema clinic therapy.  Continue to perform lymphedema clinic techniques at home.   Echo on 12/19/2022 with EF 55%; indeterminate left ventricular diastolic function; mild left atrial enlargement; mild tricuspid regurgitation; estimated PA systolic pressure is 28 mmHg; normal central venous pressure (3 mmHg).  ANA Study on 12/19/2022 demonstrated normal resting and exercise ABIs bilaterally.  BLE Arterial Ultrasound on 12/19/2022 with right resting ANA 1.1, is normal. Left resting ANA 1.1, is normal.  Bilateral lower extremity arteries with no  evidence of hemodynamically significant stenosis.  BLE Venous Reflux Study on 12/19/2022 revealed subcutaneous edema in the bilateral calf veins.  There is no DVT and no hemodynamically significant venous reflux.  Plan:   Bilateral Leg Pain- Largely due to chronic venous insufficiency with lipodermatosclerosis.  No evidence of hemodynamically significant PAD.  Leg pain has significantly improved following lymphedema clinic therapy.  Continue lymphedema clinic techniques home.  Pt to limit sodium intake to 2,000 mg daily.  Limit volume intake to 1.5 liters daily.  Elevate legs when resting.  Wear graduated compression hose.   HTN- Continue current medications.  HLD- Encourage diet, exercise, and weight loss..  Morbid Obesity- Refer to Bariatric Medicine for evaluation.    HPI:  Mr. Andres reports continued improvement in BLE edema.  He wears compression hose daily and perform lymphedema clinic techniques home.    Past Medical History:   Diagnosis Date    Anemia     Arthritis     Broken foot     Left side (fell down a ladder)    Gout     Hernia, abdominal     Hyperlipidemia     Hypertension     EVGENY on CPAP     Vitamin D deficiency      Past Surgical History:   Procedure Laterality Date    COLONOSCOPY N/A 7/23/2021    Procedure: COLONOSCOPY;  Surgeon: Emily Valente MD;  Location: [facility];  Service: Endoscopy;  Laterality: N/A;    HERNIA REPAIR       Social History     Socioeconomic History    Marital status:    Tobacco Use    Smoking status: Never    Smokeless tobacco: Never   Substance and Sexual Activity    Alcohol use: Yes     Comment: monthly    Drug use: No    Sexual activity: Yes     Partners: Female   Social History Narrative    Live with wife     Social Determinants of Health     Stress: No Stress Concern Present (8/28/2019)    [employer] - Occupational Stress Questionnaire     Feeling of Stress : Not at all     Family History   Problem Relation Age of Onset     Arthritis Father     Hypertension Father     Diabetes Father     No Known Problems Mother     Prostate cancer Neg Hx     Colon cancer Neg Hx        Review of patient's allergies indicates:  No Known Allergies    Medication List with Changes/Refills   Current Medications    ALLOPURINOL (ZYLOPRIM) 300 MG TABLET    TAKE 1 TABLET DAILY    AMLODIPINE (NORVASC) 10 MG TABLET    Take 10 mg by mouth once daily.    AMMONIUM LACTATE (LAC-HYDRIN) 12 % LOTION    Apply topically as needed for Dry Skin.    ASCORBIC ACID, VITAMIN C, (VITAMIN C) 1000 MG TABLET    Take 1,000 mg by mouth once daily.    ASPIRIN (ECOTRIN) 81 MG EC TABLET    Take 81 mg by mouth once daily.    CICLOPIROX (PENLAC) 8 % SOLN    ?Apply nail lacquer 8% solution once daily to affected nails with applicator brush, preferably at bedtime or 8 hours before washing; remove with alcohol every 7 days    ERGOCALCIFEROL (ERGOCALCIFEROL) 50,000 UNIT CAP    Take one capsule every 7 days.    HYDROCHLOROTHIAZIDE (HYDRODIURIL) 12.5 MG TAB    TAKE 1 TABLET DAILY    METHYLPREDNISOLONE (MEDROL) 4 MG TAB    Take 4 mg by mouth once daily.    METOPROLOL SUCCINATE (TOPROL-XL) 100 MG 24 HR TABLET    TAKE 1 TABLET DAILY    MULTIVITAMIN (MULTIPLE VITAMINS ORAL)    Take 1 tablet by mouth once daily.    OMEGA-3 FATTY ACIDS-FISH -880 MG CAP    Take 1 capsule by mouth once daily.    TADALAFIL (CIALIS) 5 MG TABLET    Take 1 tablet by mouth once daily    TRIAMCINOLONE ACETONIDE 0.1% (KENALOG) 0.1 % OINTMENT    Apply topically as needed.    VALSARTAN (DIOVAN) 320 MG TABLET    TAKE 1 TABLET DAILY WITH FOOD   Discontinued Medications    MOMETASONE (ELOCON) 0.1 % OINTMENT    Apply topically once daily.       Review of Systems  Constitution: Denies chills, fever, and sweats.  HENT: Denies headaches or blurry vision.  Cardiovascular: Denies chest pain or irregular heart beat.  Respiratory: Denies cough or shortness of breath.  Gastrointestinal: Denies abdominal pain, nausea, or  "vomiting.  Musculoskeletal: Denies muscle cramps.  Neurological: Denies dizziness or focal weakness.  Psychiatric/Behavioral: Normal mental status.  Hematologic/Lymphatic: Denies bleeding problem or easy bruising/bleeding.  Skin: Denies rash or suspicious lesions    Physical Examination  BP (!) 144/81   Pulse 73   Ht 5' 5" (1.651 m)   Wt 127.9 kg (281 lb 15.5 oz)   BMI 46.92 kg/m²     Constitutional: No acute distress, conversant  HEENT: Sclera anicteric, Pupils equal, round and reactive to light, extraocular motions intact, Oropharynx clear  Neck: No JVD, no carotid bruits  Cardiovascular: regular rate and rhythm, no murmur, rubs or gallops, normal S1/S2  Pulmonary: Clear to auscultation bilaterally  Abdominal: Abdomen soft, nontender, nondistended, positive bowel sounds  Extremities: BLE's with trace to 1 pitting edema, venous stasis dermatitis, lipodermatosclerosis, and changes consistent with lymphedema    Pulses:  Carotid pulses are 2+ on the right side, and 2+ on the left side.  Radial pulses are 2+ on the right side, and 2+ on the left side.   Femoral pulses are 2+ on the right side, and 2+ on the left side.  Popliteal pulses are 2+ on the right side, and 2+ on the left side.   Dorsalis pedis pulses are 2+ on the right side, and 2+ on the left side.   Posterior tibial pulses are 2+ on the right side, and 2+ on the left side.    Skin: No ecchymosis, erythema, or ulcers  Psych: Alert and oriented x 3, appropriate affect  Neuro: CNII-XII intact, no focal deficits    Labs:  Most Recent Data  CBC:   Lab Results   Component Value Date    WBC 6.29 09/12/2022    HGB 11.4 (L) 09/12/2022    HCT 35.3 (L) 09/12/2022     09/12/2022    MCV 93 09/12/2022    RDW 13.7 09/12/2022     BMP:   Lab Results   Component Value Date     04/24/2023    K 4.4 04/24/2023     04/24/2023    CO2 27 04/24/2023    BUN 23 (H) 04/24/2023    CREATININE 1.1 04/24/2023    GLU 91 04/24/2023    CALCIUM 9.4 04/24/2023     LFTS; " "  Lab Results   Component Value Date    PROT 7.3 04/24/2023    ALBUMIN 3.5 04/24/2023    BILITOT 0.5 04/24/2023    AST 16 04/24/2023    ALKPHOS 99 04/24/2023    ALT 17 04/24/2023     COAGS: No results found for: "INR", "PROTIME", "PTT"  FLP:   Lab Results   Component Value Date    CHOL 188 04/24/2023    HDL 35 (L) 04/24/2023    LDLCALC 124.4 04/24/2023    TRIG 143 04/24/2023    CHOLHDL 18.6 (L) 04/24/2023     CARDIAC: No results found for: "TROPONINI", "CKTOTAL", "CKMB", "BNP"    Imaging:    Echo 12/19/2022:  The left ventricle is normal in size with normal systolic function. The estimated ejection fraction is 55%.  Indeterminate left ventricular diastolic function.  Normal right ventricular size with normal right ventricular systolic function.  Mild left atrial enlargement.  Mild tricuspid regurgitation.  The estimated PA systolic pressure is 28 mmHg.  Normal central venous pressure (3 mmHg).    ANA Study 12/19/2022:  Normal resting and exercise ABIs bilaterally.  PVR waveforms are moderately dampened at the low thigh level bilaterally.  PVR waveforms are mildly dampened at the calf and ankle level on the left.     BLE Arterial Ultrasound 12/19/2022:  Right resting ANA 1.1, is normal.  Left resting ANA 1.1, is normal.  Bilateral lower extremity arteries with no evidence of hemodynamically significant stenosis.    BLE Venous Reflux Study 12/19/2022:  There is no evidence of a right lower extremity DVT.  There is subcutaneous edema located in the right proximal calf and distal calf veins.  There is no evidence of a left lower extremity DVT.  There is subcutaneous edema located in the left proximal calf and distal calf veins.    BLE Venous Reflux Study 6/18/2021:  Doppler ultrasound evidence of valvular insufficiency and venous reflux in the left greater saphenous vein.    Assessment/Plan:  Hi Andres is a 52 y.o. male with HTN, HLD, EVGENY (on CPAP), morbid obesity, who presents for a follow up " appointment.    Bilateral Leg Pain- Largely due to chronic venous insufficiency with lipodermatosclerosis.  No evidence of hemodynamically significant PAD.  Leg pain has significantly improved following lymphedema clinic therapy.  Continue lymphedema clinic techniques home.  Pt to limit sodium intake to 2,000 mg daily.  Limit volume intake to 1.5 liters daily.  Elevate legs when resting.  Wear graduated compression hose.     2. HTN- Continue current medications.    3. HLD- Encourage diet, exercise, and weight loss..    4. Morbid Obesity- Refer to Bariatric Medicine for evaluation.    Follow up in 6 months     Total duration of face to face visit time 30 minutes.  Total time spent counseling greater than fifty percent of total visit time.  Counseling included discussion regarding imaging findings, diagnosis, possibilities, treatment options, risks and benefits.  The patient had many questions regarding the options and long-term effects.    Hal Swift MD, PhD  Interventional Cardiology

## 2023-12-13 NOTE — PATIENT INSTRUCTIONS
Assessment/Plan:  Hi Andres is a 52 y.o. male with HTN, HLD, EVGENY (on CPAP), morbid obesity, who presents for a follow up appointment.    Bilateral Leg Pain- Largely due to chronic venous insufficiency with lipodermatosclerosis.  No evidence of hemodynamically significant PAD.  Leg pain has significantly improved following lymphedema clinic therapy.  Continue lymphedema clinic techniques home.  Pt to limit sodium intake to 2,000 mg daily.  Limit volume intake to 1.5 liters daily.  Elevate legs when resting.  Wear graduated compression hose.     2. HTN- Continue current medications.    3. HLD- Encourage diet, exercise, and weight loss..    4. Morbid Obesity- Refer to Bariatric Medicine for evaluation.    Follow up in 6 months

## 2023-12-21 DIAGNOSIS — I10 ESSENTIAL HYPERTENSION: ICD-10-CM

## 2023-12-22 RX ORDER — VALSARTAN 320 MG/1
TABLET ORAL
Qty: 90 TABLET | Refills: 0 | Status: SHIPPED | OUTPATIENT
Start: 2023-12-22 | End: 2024-03-21

## 2023-12-22 NOTE — TELEPHONE ENCOUNTER
Refill Routing Note   Medication(s) are not appropriate for processing by [facility] for the following reason(s):        Required vitals abnormal  (!) 144/81       [facility] action(s):  Defer     Requires labs : Yes      Medication Therapy Plan: (!) 144/81      Appointments  past 12m or future 3m with PCP    Date Provider   Last Visit   2/10/2023 Jhoan Chamberlain MD   Next Visit   2/12/2024 oJhan Chamberlain MD   ED visits in past 90 days: 0        Note composed:7:42 AM 12/22/2023

## 2023-12-22 NOTE — TELEPHONE ENCOUNTER
Care Due:                  Date            Visit Type   Department     Provider  --------------------------------------------------------------------------------                                EP -                              PRIMARY      [facility]    Johan Myers  Last Visit: 02-      CARE ([facility])   MEDICINE       Bulmaro  Next Visit: None Scheduled  None         None Found                                                            Last  Test          Frequency    Reason                     Performed    Due Date  --------------------------------------------------------------------------------    CBC.........  12 months..  allopurinoL..............  09- 09-    Uric Acid...  12 months..  allopurinoL..............  Not Found    Overdue    [employer] Embedded Care Due Messages. Reference number: [id number].   12/21/2023 11:39:03 PM CST

## 2024-01-28 DIAGNOSIS — I10 ESSENTIAL HYPERTENSION: ICD-10-CM

## 2024-01-29 RX ORDER — METOPROLOL SUCCINATE 100 MG/1
TABLET, EXTENDED RELEASE ORAL
Qty: 90 TABLET | Refills: 0 | Status: SHIPPED | OUTPATIENT
Start: 2024-01-29 | End: 2024-04-29

## 2024-01-29 NOTE — TELEPHONE ENCOUNTER
Care Due:                  Date            Visit Type   Department     Provider  --------------------------------------------------------------------------------                                EP -                              PRIMARY      SALVATORE Myers  Last Visit: 02-      CARE ([facility])   MEDICINE       Naccari                              EP -                              PRIMARY      [facility]    Johan Myers  Next Visit: 02-      CARE ([facility])   MEDICINE       Naccari                                                            Last  Test          Frequency    Reason                     Performed    Due Date  --------------------------------------------------------------------------------    CMP.........  12 months..  allopurinoL,               04- 04-                             hydroCHLOROthiazide,                             valsartan................    [employer] Embedded Care Due Messages. Reference number: [id number].   1/28/2024 11:53:42 PM CST

## 2024-01-29 NOTE — TELEPHONE ENCOUNTER
Refill Routing Note   Medication(s) are not appropriate for processing by [facility] for the following reason(s):        Required vitals abnormal    [facility] action(s):  Defer     Requires labs : Yes             Appointments  past 12m or future 3m with PCP    Date Provider   Last Visit   2/10/2023 Johan Chamberlain MD   Next Visit   2/12/2024 Johan Chamberlain MD   ED visits in past 90 days: 0        Note composed:4:35 AM 01/29/2024

## 2024-02-12 ENCOUNTER — OFFICE VISIT (OUTPATIENT)
Dept: FAMILY MEDICINE | Facility: CLINIC | Age: 54
End: 2024-02-12
Payer: COMMERCIAL

## 2024-02-12 ENCOUNTER — LAB VISIT (OUTPATIENT)
Dept: LAB | Facility: HOSPITAL | Age: 54
End: 2024-02-12
Attending: STUDENT IN AN ORGANIZED HEALTH CARE EDUCATION/TRAINING PROGRAM
Payer: COMMERCIAL

## 2024-02-12 VITALS
RESPIRATION RATE: 18 BRPM | DIASTOLIC BLOOD PRESSURE: 76 MMHG | HEIGHT: 65 IN | BODY MASS INDEX: 47.64 KG/M2 | WEIGHT: 285.94 LBS | HEART RATE: 98 BPM | OXYGEN SATURATION: 97 % | SYSTOLIC BLOOD PRESSURE: 128 MMHG

## 2024-02-12 DIAGNOSIS — N52.9 ERECTILE DYSFUNCTION, UNSPECIFIED ERECTILE DYSFUNCTION TYPE: ICD-10-CM

## 2024-02-12 DIAGNOSIS — Z23 NEED FOR VACCINATION: ICD-10-CM

## 2024-02-12 DIAGNOSIS — Z00.00 ROUTINE GENERAL MEDICAL EXAMINATION AT A HEALTH CARE FACILITY: ICD-10-CM

## 2024-02-12 DIAGNOSIS — E66.01 MORBID OBESITY: ICD-10-CM

## 2024-02-12 DIAGNOSIS — Z00.00 ROUTINE GENERAL MEDICAL EXAMINATION AT A HEALTH CARE FACILITY: Primary | ICD-10-CM

## 2024-02-12 LAB
ALBUMIN SERPL BCP-MCNC: 3.5 G/DL (ref 3.5–5.2)
ALP SERPL-CCNC: 89 U/L (ref 55–135)
ALT SERPL W/O P-5'-P-CCNC: 17 U/L (ref 10–44)
ANION GAP SERPL CALC-SCNC: 8 MMOL/L (ref 8–16)
AST SERPL-CCNC: 19 U/L (ref 10–40)
BILIRUB SERPL-MCNC: 0.6 MG/DL (ref 0.1–1)
BUN SERPL-MCNC: 18 MG/DL (ref 6–20)
CALCIUM SERPL-MCNC: 9.2 MG/DL (ref 8.7–10.5)
CHLORIDE SERPL-SCNC: 104 MMOL/L (ref 95–110)
CHOLEST SERPL-MCNC: 189 MG/DL (ref 120–199)
CHOLEST/HDLC SERPL: 5 {RATIO} (ref 2–5)
CO2 SERPL-SCNC: 27 MMOL/L (ref 23–29)
CREAT SERPL-MCNC: 1 MG/DL (ref 0.5–1.4)
EST. GFR  (NO RACE VARIABLE): >60 ML/MIN/1.73 M^2
GLUCOSE SERPL-MCNC: 94 MG/DL (ref 70–110)
HDLC SERPL-MCNC: 38 MG/DL (ref 40–75)
HDLC SERPL: 20.1 % (ref 20–50)
LDLC SERPL CALC-MCNC: 127.6 MG/DL (ref 63–159)
NONHDLC SERPL-MCNC: 151 MG/DL
POTASSIUM SERPL-SCNC: 3.7 MMOL/L (ref 3.5–5.1)
PROT SERPL-MCNC: 7.2 G/DL (ref 6–8.4)
SODIUM SERPL-SCNC: 139 MMOL/L (ref 136–145)
TRIGL SERPL-MCNC: 117 MG/DL (ref 30–150)
URATE SERPL-MCNC: 4.7 MG/DL (ref 3.4–7)

## 2024-02-12 PROCEDURE — 3074F SYST BP LT 130 MM HG: CPT | Mod: CPTII,S$GLB,, | Performed by: STUDENT IN AN ORGANIZED HEALTH CARE EDUCATION/TRAINING PROGRAM

## 2024-02-12 PROCEDURE — 80053 COMPREHEN METABOLIC PANEL: CPT | Performed by: STUDENT IN AN ORGANIZED HEALTH CARE EDUCATION/TRAINING PROGRAM

## 2024-02-12 PROCEDURE — 1159F MED LIST DOCD IN RCRD: CPT | Mod: CPTII,S$GLB,, | Performed by: STUDENT IN AN ORGANIZED HEALTH CARE EDUCATION/TRAINING PROGRAM

## 2024-02-12 PROCEDURE — 3078F DIAST BP <80 MM HG: CPT | Mod: CPTII,S$GLB,, | Performed by: STUDENT IN AN ORGANIZED HEALTH CARE EDUCATION/TRAINING PROGRAM

## 2024-02-12 PROCEDURE — 36415 COLL VENOUS BLD VENIPUNCTURE: CPT | Mod: PO | Performed by: STUDENT IN AN ORGANIZED HEALTH CARE EDUCATION/TRAINING PROGRAM

## 2024-02-12 PROCEDURE — 99396 PREV VISIT EST AGE 40-64: CPT | Mod: 25,S$GLB,, | Performed by: STUDENT IN AN ORGANIZED HEALTH CARE EDUCATION/TRAINING PROGRAM

## 2024-02-12 PROCEDURE — 80061 LIPID PANEL: CPT | Performed by: STUDENT IN AN ORGANIZED HEALTH CARE EDUCATION/TRAINING PROGRAM

## 2024-02-12 PROCEDURE — 1160F RVW MEDS BY RX/DR IN RCRD: CPT | Mod: CPTII,S$GLB,, | Performed by: STUDENT IN AN ORGANIZED HEALTH CARE EDUCATION/TRAINING PROGRAM

## 2024-02-12 PROCEDURE — 90686 IIV4 VACC NO PRSV 0.5 ML IM: CPT | Mod: S$GLB,,, | Performed by: STUDENT IN AN ORGANIZED HEALTH CARE EDUCATION/TRAINING PROGRAM

## 2024-02-12 PROCEDURE — 3008F BODY MASS INDEX DOCD: CPT | Mod: CPTII,S$GLB,, | Performed by: STUDENT IN AN ORGANIZED HEALTH CARE EDUCATION/TRAINING PROGRAM

## 2024-02-12 PROCEDURE — 99999 PR PBB SHADOW E&M-EST. PATIENT-LVL V: CPT | Mod: PBBFAC,,, | Performed by: STUDENT IN AN ORGANIZED HEALTH CARE EDUCATION/TRAINING PROGRAM

## 2024-02-12 PROCEDURE — 84550 ASSAY OF BLOOD/URIC ACID: CPT | Performed by: STUDENT IN AN ORGANIZED HEALTH CARE EDUCATION/TRAINING PROGRAM

## 2024-02-12 PROCEDURE — 90471 IMMUNIZATION ADMIN: CPT | Mod: S$GLB,,, | Performed by: STUDENT IN AN ORGANIZED HEALTH CARE EDUCATION/TRAINING PROGRAM

## 2024-02-12 RX ORDER — TADALAFIL 5 MG/1
5 TABLET ORAL DAILY
Qty: 30 TABLET | Refills: 3 | Status: SHIPPED | OUTPATIENT
Start: 2024-02-12

## 2024-02-12 NOTE — PROGRESS NOTES
"[facility] MEDICINE CLINIC NOTE    Patient Name: Hi Andres  YOB: 1970    PRESENTING HISTORY     History of Present Illness:  Mr. Hi Andres is a 53 y.o. male here for annual.     No sig changes in his health.     Doesn't monitor BP at home.     He has been working on losing weight. Steady, slow reduction.     Has been on allopurinol for some time. He is not sure if he ever had gout, probably no. Had uric acid elevationin blood. Discussed options of continuation (potential, but less proven cardiac benefit) vs risk of cessation.   Will get level and then decide.     ED- takes cialis 5 prn which works well.     ROS      OBJECTIVE:   Vital Signs:  Vitals:    02/12/24 1006 02/12/24 1013   BP: (!) 146/88 128/76   Pulse: 98    Resp: 18    SpO2: 97%    Weight: 129.7 kg (285 lb 15 oz)    Height: 5' 5" (1.651 m)             Physical Exam  Vitals and nursing note reviewed.   Constitutional:       Appearance: He is obese. He is not diaphoretic.   HENT:      Head: Normocephalic and atraumatic.      Right Ear: External ear normal.      Left Ear: External ear normal.   Eyes:      General: No scleral icterus.     Conjunctiva/sclera: Conjunctivae normal.      Pupils: Pupils are equal, round, and reactive to light.   Neck:      Thyroid: No thyromegaly.   Cardiovascular:      Rate and Rhythm: Normal rate and regular rhythm.      Heart sounds: Normal heart sounds. No murmur heard.  Pulmonary:      Effort: Pulmonary effort is normal. No respiratory distress.      Breath sounds: Normal breath sounds. No wheezing or rales.   Musculoskeletal:         General: No tenderness or deformity. Normal range of motion.      Cervical back: Normal range of motion and neck supple.      Right lower leg: Edema present.      Left lower leg: Edema present.   Lymphadenopathy:      Cervical: No cervical adenopathy.   Skin:     General: Skin is warm and dry.      Findings: No erythema or rash.   Neurological:      " Mental Status: He is alert and oriented to person, place, and time.      Gait: Gait is intact.   Psychiatric:         Mood and Affect: Mood and affect normal.         Cognition and Memory: Memory normal.         Judgment: Judgment normal.         ASSESSMENT & PLAN:     Routine general medical examination at a health care facility  -     URIC ACID; Future; Expected date: 02/12/2024  -     COMPREHENSIVE METABOLIC PANEL; Future; Expected date: 02/12/2024  -     Lipid Panel; Future; Expected date: 05/13/2024    Erectile dysfunction, unspecified erectile dysfunction type  -     tadalafiL (CIALIS) 5 MG tablet; Take 1 tablet (5 mg total) by mouth once daily.  Dispense: 30 tablet; Refill: 3    Need for vaccination  -     Influenza - Quadrivalent (PF)    Morbid obesity  Improving. Offered adjunct therapy but he prefers to do with lifestyle only for now.         Johan Chamberlain  Internal Medicine

## 2024-02-12 NOTE — PATIENT INSTRUCTIONS
Miguelangel Do,     If you are due for any health screening(s) below please notify me so we can arrange them to be ordered and scheduled to maintain your health. Most healthy patients complete it. Don't lose out on improving your health.     Tests to Keep You Healthy    Colon Cancer Screening: Met on 7/23/2021  Last Blood Pressure <= 139/89 (2/12/2024): NO

## 2024-02-12 NOTE — PROGRESS NOTES
2 patient identifiers used (name and ). Administered Flu vaccine IM ( RD) . Patient tolerated well, no bleeding at insertion site noted. Pain 0 on scale 0/10. Aseptic technique maintained. Immunization information given to patient. Advised patient to remain in clinic for 15 minutes to monitor for reaction. No AR noted.

## 2024-03-20 DIAGNOSIS — I10 ESSENTIAL HYPERTENSION: ICD-10-CM

## 2024-03-21 RX ORDER — VALSARTAN 320 MG/1
TABLET ORAL
Qty: 90 TABLET | Refills: 3 | Status: SHIPPED | OUTPATIENT
Start: 2024-03-21

## 2024-03-21 RX ORDER — HYDROCHLOROTHIAZIDE 12.5 MG/1
TABLET ORAL
Qty: 90 TABLET | Refills: 3 | Status: SHIPPED | OUTPATIENT
Start: 2024-03-21

## 2024-03-21 NOTE — TELEPHONE ENCOUNTER
No care due was identified.  [facility] Embedded Care Due Messages. Reference number: [id number].   3/20/2024 11:46:48 PM CDT

## 2024-03-21 NOTE — TELEPHONE ENCOUNTER
Refill Routing Note   Medication(s) are not appropriate for processing by [facility] for the following reason(s):        Drug-disease interaction  Drug-Disease: hydroCHLOROthiazide and Gout    [facility] action(s):  Defer  Approve               Appointments  past 12m or future 3m with PCP    Date Provider   Last Visit   2/12/2024 Johan Chamberlain MD   Next Visit   Visit date not found Johan Chamberlain MD   ED visits in past 90 days: 0        Note composed:5:10 AM 03/21/2024

## 2024-04-28 DIAGNOSIS — I10 ESSENTIAL HYPERTENSION: ICD-10-CM

## 2024-04-29 RX ORDER — METOPROLOL SUCCINATE 100 MG/1
TABLET, EXTENDED RELEASE ORAL
Qty: 90 TABLET | Refills: 3 | Status: SHIPPED | OUTPATIENT
Start: 2024-04-29

## 2024-04-29 NOTE — TELEPHONE ENCOUNTER
No care due was identified.  [facility] Embedded Care Due Messages. Reference number: [id number].   4/28/2024 11:46:39 PM CDT

## 2024-04-30 NOTE — TELEPHONE ENCOUNTER
Refill Decision Note   Hi Andres  is requesting a refill authorization.  Brief Assessment and Rationale for Refill:  Approve     Medication Therapy Plan:        Comments:     Note composed:7:05 PM 04/29/2024

## 2024-05-28 ENCOUNTER — OFFICE VISIT (OUTPATIENT)
Dept: FAMILY MEDICINE | Facility: CLINIC | Age: 54
End: 2024-05-28
Payer: COMMERCIAL

## 2024-05-28 VITALS
HEART RATE: 73 BPM | BODY MASS INDEX: 48.15 KG/M2 | WEIGHT: 289 LBS | HEIGHT: 65 IN | DIASTOLIC BLOOD PRESSURE: 80 MMHG | RESPIRATION RATE: 16 BRPM | SYSTOLIC BLOOD PRESSURE: 142 MMHG | TEMPERATURE: 98 F | OXYGEN SATURATION: 96 %

## 2024-05-28 DIAGNOSIS — I10 PRIMARY HYPERTENSION: ICD-10-CM

## 2024-05-28 DIAGNOSIS — R21 RASH AND NONSPECIFIC SKIN ERUPTION: ICD-10-CM

## 2024-05-28 DIAGNOSIS — E66.01 MORBID OBESITY WITH BMI OF 45.0-49.9, ADULT: ICD-10-CM

## 2024-05-28 DIAGNOSIS — S61.219A CUT OF FINGER: Primary | ICD-10-CM

## 2024-05-28 PROCEDURE — 99999 PR PBB SHADOW E&M-EST. PATIENT-LVL V: CPT | Mod: PBBFAC,,,

## 2024-05-28 PROCEDURE — 1160F RVW MEDS BY RX/DR IN RCRD: CPT | Mod: CPTII,S$GLB,,

## 2024-05-28 PROCEDURE — 1159F MED LIST DOCD IN RCRD: CPT | Mod: CPTII,S$GLB,,

## 2024-05-28 PROCEDURE — 99214 OFFICE O/P EST MOD 30 MIN: CPT | Mod: S$GLB,,,

## 2024-05-28 PROCEDURE — 3077F SYST BP >= 140 MM HG: CPT | Mod: CPTII,S$GLB,,

## 2024-05-28 PROCEDURE — 3008F BODY MASS INDEX DOCD: CPT | Mod: CPTII,S$GLB,,

## 2024-05-28 PROCEDURE — 4010F ACE/ARB THERAPY RXD/TAKEN: CPT | Mod: CPTII,S$GLB,,

## 2024-05-28 PROCEDURE — 3079F DIAST BP 80-89 MM HG: CPT | Mod: CPTII,S$GLB,,

## 2024-05-28 RX ORDER — SULFAMETHOXAZOLE AND TRIMETHOPRIM 800; 160 MG/1; MG/1
1 TABLET ORAL 2 TIMES DAILY
Qty: 14 TABLET | Refills: 0 | Status: SHIPPED | OUTPATIENT
Start: 2024-05-28 | End: 2024-06-04

## 2024-05-28 RX ORDER — TRIAMCINOLONE ACETONIDE 1 MG/G
OINTMENT TOPICAL 2 TIMES DAILY
Qty: 80 G | Refills: 1 | Status: SHIPPED | OUTPATIENT
Start: 2024-05-28

## 2024-05-28 NOTE — PROGRESS NOTES
Subjective:       Patient ID: Hi Andres is a 53 y.o. male.    Chief Complaint: Laceration    Laceration   The incident occurred 3 to 5 days ago. The laceration is located on the Right hand. The laceration is 1 cm in size. The laceration mechanism was a nail (Case.While cleaning catfish). The patient is experiencing no pain. He reports no foreign bodies present. His tetanus status is UTD.   Also has a rash of his bilateral forearms. Itchy. Applied benadryl with some relief of symptoms.     Dark pigmentation of neck. Applied new cologne to the area recently.     Past Medical History:   Diagnosis Date    Anemia     Arthritis     Broken foot     Left side (fell down a ladder)    Gout     Hernia, abdominal     Hyperlipidemia     Hypertension     EVGENY on CPAP     Vitamin D deficiency        Review of patient's allergies indicates:  No Known Allergies      Current Outpatient Medications:     allopurinoL (ZYLOPRIM) 300 MG tablet, TAKE 1 TABLET DAILY, Disp: 90 tablet, Rfl: 3    amLODIPine (NORVASC) 10 MG tablet, Take 10 mg by mouth once daily., Disp: , Rfl:     ascorbic acid, vitamin C, (VITAMIN C) 1000 MG tablet, Take 1,000 mg by mouth once daily., Disp: , Rfl:     aspirin (ECOTRIN) 81 MG EC tablet, Take 81 mg by mouth once daily., Disp: , Rfl:     hydroCHLOROthiazide (HYDRODIURIL) 12.5 MG Tab, TAKE 1 TABLET DAILY, Disp: 90 tablet, Rfl: 3    methylPREDNISolone (MEDROL) 4 MG Tab, Take 4 mg by mouth once daily., Disp: , Rfl:     metoprolol succinate (TOPROL-XL) 100 MG 24 hr tablet, TAKE 1 TABLET DAILY, Disp: 90 tablet, Rfl: 3    multivitamin (MULTIPLE VITAMINS ORAL), Take 1 tablet by mouth once daily., Disp: , Rfl:     omega-3 fatty acids-fish oil 435-880 mg Cap, Take 1 capsule by mouth once daily., Disp: , Rfl:     tadalafiL (CIALIS) 5 MG tablet, Take 1 tablet (5 mg total) by mouth once daily., Disp: 30 tablet, Rfl: 3    valsartan (DIOVAN) 320 MG tablet, TAKE 1 TABLET DAILY WITH FOOD, Disp: 90 tablet, Rfl: 3     "ammonium lactate (LAC-HYDRIN) 12 % lotion, Apply topically as needed for Dry Skin. (Patient not taking: Reported on 5/28/2024), Disp: , Rfl:     ciclopirox (PENLAC) 8 % Soln, ?Apply nail lacquer 8% solution once daily to affected nails with applicator brush, preferably at bedtime or 8 hours before washing; remove with alcohol every 7 days (Patient not taking: Reported on 5/28/2024), Disp: 6.6 mL, Rfl: 2    sulfamethoxazole-trimethoprim 800-160mg (BACTRIM DS) 800-160 mg Tab, Take 1 tablet by mouth 2 (two) times daily. for 7 days, Disp: 14 tablet, Rfl: 0    triamcinolone acetonide 0.1% (KENALOG) 0.1 % ointment, Apply topically 2 (two) times daily., Disp: 80 g, Rfl: 1    Review of Systems   Constitutional:  Negative for chills and fever.   Skin:  Positive for color change, rash and wound.       Objective:      BP (!) 142/80 (BP Location: Right arm, Patient Position: Sitting, BP Method: Large (Manual))   Pulse 73   Temp 98.2 °F (36.8 °C) (Oral)   Resp 16   Ht 5' 5" (1.651 m)   Wt 131.1 kg (289 lb 0.4 oz)   SpO2 96%   BMI 48.10 kg/m²   Physical Exam  Vitals reviewed.   Constitutional:       General: He is not in acute distress.     Appearance: Normal appearance. He is obese. He is not ill-appearing, toxic-appearing or diaphoretic.   HENT:      Head: Normocephalic.      Right Ear: External ear normal.      Left Ear: External ear normal.      Nose: Nose normal. No congestion or rhinorrhea.      Mouth/Throat:      Mouth: Mucous membranes are moist.      Pharynx: Oropharynx is clear.   Eyes:      General: No scleral icterus.        Right eye: No discharge.         Left eye: No discharge.      Extraocular Movements: Extraocular movements intact.      Conjunctiva/sclera: Conjunctivae normal.   Cardiovascular:      Rate and Rhythm: Normal rate and regular rhythm.      Pulses: Normal pulses.      Heart sounds: Normal heart sounds. No murmur heard.     No friction rub. No gallop.   Pulmonary:      Effort: Pulmonary effort " is normal. No respiratory distress.      Breath sounds: Normal breath sounds. No wheezing, rhonchi or rales.   Chest:      Chest wall: No tenderness.   Musculoskeletal:         General: No swelling, tenderness or deformity. Normal range of motion.      Cervical back: Normal range of motion.      Right lower leg: No edema.      Left lower leg: No edema.   Skin:     General: Skin is warm and dry.      Capillary Refill: Capillary refill takes less than 2 seconds.      Coloration: Skin is not jaundiced.      Findings: Rash present. No bruising, erythema or lesion.      Comments: Laceration R index finger    Neurological:      Mental Status: He is alert and oriented to person, place, and time.         Assessment:       1. Cut of finger    2. Rash and nonspecific skin eruption    3. Primary hypertension    4. Morbid obesity with BMI of 45.0-49.9, adult        Plan:       Cut of finger  -     sulfamethoxazole-trimethoprim 800-160mg (BACTRIM DS) 800-160 mg Tab; Take 1 tablet by mouth 2 (two) times daily. for 7 days  Dispense: 14 tablet; Refill: 0    Rash and nonspecific skin eruption  -     triamcinolone acetonide 0.1% (KENALOG) 0.1 % ointment; Apply topically 2 (two) times daily.  Dispense: 80 g; Refill: 1    Primary hypertension       -    Elevated at presentation slightly. Didn't take meds this morning. Encouraged him to take every morning. Will take prior to our next follow up for recheck     Morbid obesity with BMI of 45.0-49.9, adult        -     Patient would benefit from healthy diet, exercise and weight loss. Overall health and wellbeing would likely improve.                   Mirza Haddad PA-C  Family Medicine Physician Assistant       Future Appointments       Date Provider Specialty Appt Notes    6/4/2024 Mirza Haddad PA-C Family Medicine One week f/u    2/14/2025 Johan Chamberlain MD Family Medicine annual               I spent a total of 20 minutes on the day of the visit.This includes face to face  time and non-face to face time preparing to see the patient (eg, review of tests), obtaining and/or reviewing separately obtained history, documenting clinical information in the electronic or other health record, independently interpreting results and communicating results to the patient/family/caregiver, or care coordinator.      We have addressed [4] Moderate: 1 or more chronic illnesses with exacerbation, progression, or side effects of treatment / 2 or more stable chronic illnesses / 1 undiagnosed new problem with uncertain prognosis / 1 acute illness with systemic symptoms / 1 acute complicated injury  The complexity of the data reviewed and analyzed for this visit was [2] Minimal or None  The risk of complications and/or morbidity or mortality are [4] Moderate risk (I.e. prescription drug management / decision regarding minor surgery with identified pt or procedure risk factors / decision regarding elective major surgery without identified pt or procedure risk factors / diagnosis or treatment significantly limited by social determinants of health)   The level of Medical Decision Making for this visit is [4] Moderate

## 2024-05-28 NOTE — PATIENT INSTRUCTIONS
"[facility] Blood Type Test Kit, Blood Typing Kit w/ Instructions - [facility]         Hi Hi,     If you are due for any health screening(s) below please notify me so we can arrange them to be ordered and scheduled to maintain your health. Most healthy patients complete it. Don't lose out on improving your health.     All of your core healthy metrics are met.                          Patient Education       Checking Your Blood Pressure at Home   The Basics   Written by the doctors and editors at [facility]   How is blood pressure measured? -- Blood pressure is usually measured with a device that goes around your upper arm. This is often done in a doctor's office. But some people also check their blood pressure themselves, at home or at work.  Blood pressure is explained with 2 numbers. For instance, your blood pressure might be "140 over 90." The first (top) number is the pressure inside your arteries when your heart is dalia. The second (bottom) number is the pressure inside your arteries when your heart is relaxed. The table shows how doctors and nurses define high and normal blood pressure (table 1).  If your blood pressure gets too high, it puts you at risk for heart attack, stroke, and kidney disease. High blood pressure does not usually cause symptoms. But it can be serious.  What is a home blood pressure meter? -- A home blood pressure meter (or "monitor") is a device you can use to check your blood pressure yourself. It has a cuff that goes around your upper arm (figure 1). Some devices have a cuff that goes around your wrist instead. But doctors aren't sure if these work as well. The meter also has a small screen, or dial, that shows your blood pressure numbers.  There are also special meters you can wear for a day or 2. These are different because they automatically check your blood pressure throughout the day and night, even while you are sleeping. If your doctor thinks you should use one of " these devices, they will talk to you about how to wear it.  Why do I need to check my blood pressure at home? -- If your doctor knows or suspects that you have high blood pressure, they might want you to check it at home. There are a few reasons for this. Your doctor might want to look at:  Whether your blood pressure measures the same at home as it did in the doctor's office  How well your blood pressure medicines are working  Changes in your blood pressure, for example, if it goes up and down  People who check their own blood pressure at home usually do better at keeping it low.  How do I choose a home blood pressure meter? -- When choosing a home blood pressure meter, you will probably want to think about:  Cost - Some devices cost more than others. You should also check to see if your insurance will help pay for your device.  Size - It's important to make sure the cuff fits your arm comfortably. Your doctor or nurse can help you with this.  How easy it is to use - You should make sure you understand how to use the device. You also need to be able to read the numbers on the screen.  You do not need a prescription to buy a home blood pressure meter. You can buy them at most pharmacies or over the internet. Your doctor or nurse can help you choose the right device for you.  How do I check my blood pressure at home? -- Once you have a home blood pressure meter, your doctor or nurse should check it to make sure it fits you and works correctly.  When it's time to check your blood pressure:  Go to the bathroom and empty your bladder first. Having a full bladder can temporarily increase your blood pressure, making the results inaccurate.  Sit in a chair with your feet flat on the ground.  Try to breathe normally and stay calm.  Attach the cuff to your arm. Place the cuff directly on your skin, not over your clothing. The cuff should be tight enough to not slip down, but not uncomfortably tight.  Sit and relax for about  3 to 5 minutes with the cuff on.  Follow the directions that came with your device to start measuring your blood pressure. This might involve squeezing the bulb at the end of the tube to inflate the cuff (fill it with air). With some monitors, you just need to press a button to inflate the cuff. When the cuff fills with air, it feels like someone is squeezing your arm, but it should not hurt. Then you will slowly deflate the cuff (let the air out of it), or it will deflate by itself. The screen or dial will show your blood pressure numbers.  Stay seated and relax for 1 minute, then measure your blood pressure again.  How often should I check my blood pressure? -- It depends. Different people need to follow different schedules. Your doctor or nurse will tell you how often to check your blood pressure, and when. Some people need to check their blood pressure twice a day, in the morning and evening.  Your doctor or nurse will probably tell you to keep track of your blood pressure for at least a few days (table 2). Then they will look at the numbers. The reason for this is that it's normal for your blood pressure to change a bit from day to day. For example, the numbers might change depending on whether you recently had caffeine, just exercised, or feel stressed. Checking your blood pressure over several days - or longer - will give your doctor or nurse a better idea of what is average for you.  How should I keep track of my blood pressure? -- Some blood pressure meters will record your numbers for you, or send them to your computer or smartphone. If yours does not do this, you will need to write them down. Your doctor or nurse can help you figure out the best way to keep track of the numbers.  What if my blood pressure is high? -- Your doctor or nurse will tell you what to do if your blood pressure is high when you check it at home. If you get a number that is higher than normal, measure it again to see if it is still  "high. If it is very high (above a certain number, which your doctor or nurse will tell you to watch out for), you should call your doctor right away.  If your blood pressure is only a little high, your doctor or nurse might tell you to keep checking it for a few more days or weeks, and then call if it does not go back down. Then they can help you decide what to do next.  All topics are updated as new evidence becomes available and our peer review process is complete.  This topic retrieved from [employer] on: Sep 21, 2021.  Topic 166410 Version 4.0  Release: 29.4.2 - C29.263  © 2021 Up[employer]Date, Inc. and/or its affiliates. All rights reserved.  table 1: Definition of normal and high blood pressure  Level  Top number  Bottom number    High 130 or above 80 or above   Elevated 120 to 129 79 or below   Normal 119 or below 79 or below   These definitions are from the American College of Cardiology/American Heart Association. Other expert groups might use slightly different definitions.  "Elevated blood pressure" is a term doctor or nurses use as a warning. It means you do not yet have high blood pressure, but your blood pressure is not as low as it should be for good health.  Graphic 00965 Version 6.0  figure 1: Using a home blood pressure meter     This is an example of a person using a home blood pressure meter.  Graphic 870996 Version 1.0    table 2: 7-day diary for checking blood pressure at home  Day 1  Day 2  Day 3  Day 4  Day 5  Day 6  Day 7    Morning  1st read Morning  1st read Morning  1st read Morning  1st read Morning  1st read Morning  1st read Morning  1st read   Systolic: __________ Systolic: __________ Systolic: __________ Systolic: __________ Systolic: __________ Systolic: __________ Systolic: __________   Diastolic: __________ Diastolic: __________ Diastolic: __________ Diastolic: __________ Diastolic: __________ Diastolic: __________ Diastolic: __________   Pulse: __________ Pulse: __________ Pulse: " __________ Pulse: __________ Pulse: __________ Pulse: __________ Pulse: __________   Morning  2nd read Morning  2nd read Morning  2nd read Morning  2nd read Morning  2nd read Morning  2nd read Morning  2nd read   Systolic: __________ Systolic: __________ Systolic: __________ Systolic: __________ Systolic: __________ Systolic: __________ Systolic: __________   Diastolic: __________ Diastolic: __________ Diastolic: __________ Diastolic: __________ Diastolic: __________ Diastolic: __________ Diastolic: __________   Pulse: __________ Pulse: __________ Pulse: __________ Pulse: __________ Pulse: __________ Pulse: __________ Pulse: __________   Evening  1st read Evening  1st read Evening  1st read Evening  1st read Evening  1st read Evening  1st read Evening  1st read   Systolic: __________ Systolic: __________ Systolic: __________ Systolic: __________ Systolic: __________ Systolic: __________ Systolic: __________   Diastolic: __________ Diastolic: __________ Diastolic: __________ Diastolic: __________ Diastolic: __________ Diastolic: __________ Diastolic: __________   Pulse: __________ Pulse: __________ Pulse: __________ Pulse: __________ Pulse: __________ Pulse: __________ Pulse: __________   Evening  2nd read Evening  2nd read Evening  2nd read Evening  2nd read Evening  2nd read Evening  2nd read Evening  2nd read   Systolic: __________ Systolic: __________ Systolic: __________ Systolic: __________ Systolic: __________ Systolic: __________ Systolic: __________   Diastolic: __________ Diastolic: __________ Diastolic: __________ Diastolic: __________ Diastolic: __________ Diastolic: __________ Diastolic: __________   Pulse: __________ Pulse: __________ Pulse: __________ Pulse: __________ Pulse: __________ Pulse: __________ Pulse: __________   Notes    Notes    Notes    Notes    Notes    Notes    Notes      ____________________ ____________________ ____________________ ____________________ ____________________  ____________________ ____________________   ____________________ ____________________ ____________________ ____________________ ____________________ ____________________ ____________________   ____________________ ____________________ ____________________ ____________________ ____________________ ____________________ ____________________   Patient name: ______________________________     Patient ID: ________________________________    Primary care provider: _______________________    Average BP: _______________________________    Graphic 911163 Version 1.0  Consumer Information Use and Disclaimer   This information is not specific medical advice and does not replace information you receive from your health care provider. This is only a brief summary of general information. It does NOT include all information about conditions, illnesses, injuries, tests, procedures, treatments, therapies, discharge instructions or life-style choices that may apply to you. You must talk with your health care provider for complete information about your health and treatment options. This information should not be used to decide whether or not to accept your health care provider's advice, instructions or recommendations. Only your health care provider has the knowledge and training to provide advice that is right for you. The use of this information is governed by the [employer] End User License Agreement, available at https://www.[employer]/en/solutions/[employer]/about/vika.The use of [employer] content is governed by the [employer] Terms of Use. ©2021 [employer] All rights reserved.  Copyright   © 2021 UpToDate, Inc. and/or its affiliates. All rights reserved.

## 2024-06-21 ENCOUNTER — OFFICE VISIT (OUTPATIENT)
Dept: FAMILY MEDICINE | Facility: CLINIC | Age: 54
End: 2024-06-21
Payer: COMMERCIAL

## 2024-06-21 VITALS
OXYGEN SATURATION: 96 % | TEMPERATURE: 98 F | RESPIRATION RATE: 16 BRPM | HEART RATE: 82 BPM | DIASTOLIC BLOOD PRESSURE: 80 MMHG | WEIGHT: 287.06 LBS | BODY MASS INDEX: 47.83 KG/M2 | HEIGHT: 65 IN | SYSTOLIC BLOOD PRESSURE: 130 MMHG

## 2024-06-21 DIAGNOSIS — E66.01 MORBID OBESITY WITH BMI OF 45.0-49.9, ADULT: Primary | ICD-10-CM

## 2024-06-21 DIAGNOSIS — Z91.89 AT RISK FOR CARDIOVASCULAR EVENT: ICD-10-CM

## 2024-06-21 DIAGNOSIS — E78.5 HYPERLIPIDEMIA, UNSPECIFIED HYPERLIPIDEMIA TYPE: ICD-10-CM

## 2024-06-21 DIAGNOSIS — G47.33 OSA ON CPAP: ICD-10-CM

## 2024-06-21 DIAGNOSIS — I10 PRIMARY HYPERTENSION: ICD-10-CM

## 2024-06-21 PROCEDURE — 99999 PR PBB SHADOW E&M-EST. PATIENT-LVL V: CPT | Mod: PBBFAC,,,

## 2024-06-21 RX ORDER — ERGOCALCIFEROL 1.25 MG/1
50000 CAPSULE ORAL
COMMUNITY
Start: 2024-04-08

## 2024-06-21 NOTE — PROGRESS NOTES
Subjective:       Patient ID: Hi Andres is a 53 y.o. male.    Chief Complaint: Weight Loss    Presents to discuss weight loss options. Discussed with Dr. Chamberlain previously. Considered bariatrics but he prefers to avoid surgery if possible. Sister is on injectable therapy which works well for her. He is not diabetic. He does have elevated ASCVD risk. Denies known fam hx of cardiac events. Previously on statin therapy but no longer. Unsure of why it was stopped. Denies depression symptoms. Denies hx kidney stones.         Past Medical History:   Diagnosis Date    Anemia     Arthritis     Broken foot     Left side (fell down a ladder)    Gout     Hernia, abdominal     Hyperlipidemia     Hypertension     EVGENY on CPAP     Vitamin D deficiency        Review of patient's allergies indicates:  No Known Allergies      Current Outpatient Medications:     allopurinoL (ZYLOPRIM) 300 MG tablet, TAKE 1 TABLET DAILY, Disp: 90 tablet, Rfl: 3    amLODIPine (NORVASC) 10 MG tablet, Take 10 mg by mouth once daily., Disp: , Rfl:     ascorbic acid, vitamin C, (VITAMIN C) 1000 MG tablet, Take 1,000 mg by mouth once daily., Disp: , Rfl:     aspirin (ECOTRIN) 81 MG EC tablet, Take 81 mg by mouth once daily., Disp: , Rfl:     ergocalciferol (ERGOCALCIFEROL) 50,000 unit Cap, Take 50,000 Units by mouth every 7 days., Disp: , Rfl:     hydroCHLOROthiazide (HYDRODIURIL) 12.5 MG Tab, TAKE 1 TABLET DAILY, Disp: 90 tablet, Rfl: 3    methylPREDNISolone (MEDROL) 4 MG Tab, Take 4 mg by mouth once daily., Disp: , Rfl:     metoprolol succinate (TOPROL-XL) 100 MG 24 hr tablet, TAKE 1 TABLET DAILY, Disp: 90 tablet, Rfl: 3    multivitamin (MULTIPLE VITAMINS ORAL), Take 1 tablet by mouth once daily., Disp: , Rfl:     omega-3 fatty acids-fish oil 435-880 mg Cap, Take 1 capsule by mouth once daily., Disp: , Rfl:     tadalafiL (CIALIS) 5 MG tablet, Take 1 tablet (5 mg total) by mouth once daily., Disp: 30 tablet, Rfl: 3    triamcinolone acetonide 0.1%  "(KENALOG) 0.1 % ointment, Apply topically 2 (two) times daily., Disp: 80 g, Rfl: 1    valsartan (DIOVAN) 320 MG tablet, TAKE 1 TABLET DAILY WITH FOOD, Disp: 90 tablet, Rfl: 3    ammonium lactate (LAC-HYDRIN) 12 % lotion, Apply topically as needed for Dry Skin. (Patient not taking: Reported on 5/28/2024), Disp: , Rfl:     ciclopirox (PENLAC) 8 % Soln, ?Apply nail lacquer 8% solution once daily to affected nails with applicator brush, preferably at bedtime or 8 hours before washing; remove with alcohol every 7 days (Patient not taking: Reported on 5/28/2024), Disp: 6.6 mL, Rfl: 2    Review of Systems   Constitutional:  Negative for activity change, appetite change, chills, diaphoresis, fatigue, fever and unexpected weight change.   HENT:  Negative for congestion, ear pain, postnasal drip, rhinorrhea, sinus pressure, sneezing, sore throat and trouble swallowing.    Eyes:  Negative for pain, itching and visual disturbance.   Respiratory:  Negative for cough, chest tightness, shortness of breath and wheezing.    Cardiovascular:  Negative for chest pain, palpitations and leg swelling.   Gastrointestinal:  Negative for abdominal distention, abdominal pain, blood in stool, constipation, diarrhea, nausea and vomiting.   Endocrine: Negative for cold intolerance and heat intolerance.   Genitourinary:  Negative for difficulty urinating, dysuria, frequency, hematuria and urgency.   Musculoskeletal:  Negative for arthralgias, back pain, myalgias and neck pain.   Skin:  Negative for color change, pallor, rash and wound.   Neurological:  Negative for dizziness, syncope, weakness, numbness and headaches.   Hematological:  Negative for adenopathy.   Psychiatric/Behavioral:  Negative for behavioral problems. The patient is not nervous/anxious.        Objective:      /80 (BP Location: Right arm, Patient Position: Sitting, BP Method: Large (Manual))   Pulse 82   Temp 98.4 °F (36.9 °C) (Oral)   Resp 16   Ht 5' 5" (1.651 m)   " Wt 130.2 kg (287 lb 0.6 oz)   SpO2 96%   BMI 47.77 kg/m²   Physical Exam  Vitals reviewed.   Constitutional:       General: He is not in acute distress.     Appearance: Normal appearance. He is obese. He is not ill-appearing, toxic-appearing or diaphoretic.   HENT:      Head: Normocephalic.      Right Ear: External ear normal.      Left Ear: External ear normal.      Nose: Nose normal. No congestion or rhinorrhea.      Mouth/Throat:      Mouth: Mucous membranes are moist.      Pharynx: Oropharynx is clear.   Eyes:      General: No scleral icterus.        Right eye: No discharge.         Left eye: No discharge.      Extraocular Movements: Extraocular movements intact.      Conjunctiva/sclera: Conjunctivae normal.   Cardiovascular:      Rate and Rhythm: Normal rate and regular rhythm.      Pulses: Normal pulses.      Heart sounds: Normal heart sounds. No murmur heard.     No friction rub. No gallop.   Pulmonary:      Effort: Pulmonary effort is normal. No respiratory distress.      Breath sounds: Normal breath sounds. No wheezing, rhonchi or rales.   Chest:      Chest wall: No tenderness.   Musculoskeletal:         General: No swelling, tenderness or deformity. Normal range of motion.      Cervical back: Normal range of motion.      Right lower leg: No edema.      Left lower leg: No edema.   Skin:     General: Skin is warm and dry.      Capillary Refill: Capillary refill takes less than 2 seconds.      Coloration: Skin is not jaundiced.      Findings: No bruising, erythema, lesion or rash.   Neurological:      Mental Status: He is alert and oriented to person, place, and time.      Gait: Gait normal.   Psychiatric:         Mood and Affect: Mood normal.         Behavior: Behavior normal.         Thought Content: Thought content normal.         Judgment: Judgment normal.         Assessment:       1. Morbid obesity with BMI of 45.0-49.9, adult    2. At risk for cardiovascular event    3. Primary hypertension    4.  Hyperlipidemia, unspecified hyperlipidemia type    5. EVGENY on CPAP        Plan:       Morbid obesity with BMI of 45.0-49.9, adult        -     Patient would benefit from healthy diet, exercise and weight loss. Overall health and wellbeing would likely improve.         -     Assess cardiac score. If CAD present will attempt injectable therapy through insurance. If not approved he can consider cash pay options through other provider. Consider topamax as alternative. Can consider adipex but this would have to come from Dr. Chamberlain.     At risk for cardiovascular event  -     CT Cardiac Scoring; Future; Expected date: 06/21/2024    The 10-year ASCVD risk score (Jocelyne SANCHEZ, et al., 2019) is: 11.5%    Values used to calculate the score:      Age: 53 years      Sex: Male      Is Non- : Yes      Diabetic: No      Tobacco smoker: No      Systolic Blood Pressure: 130 mmHg      Is BP treated: Yes      HDL Cholesterol: 38 mg/dL      Total Cholesterol: 189 mg/dL    Primary hypertension        -    Stable. Continue meds. Will continue to monitor.     Hyperlipidemia, unspecified hyperlipidemia type        -     Consider statin therapy     EVGENY on CPAP        -   Continue CPAP. Would benefit from weight loss.                Mirza Haddad PA-C  Family Medicine Physician Assistant       Future Appointments       Date Provider Specialty Appt Notes    2/14/2025 Johan Chamberlain MD Family Medicine annual               I spent a total of 20 minutes on the day of the visit.This includes face to face time and non-face to face time preparing to see the patient (eg, review of tests), obtaining and/or reviewing separately obtained history, documenting clinical information in the electronic or other health record, independently interpreting results and communicating results to the patient/family/caregiver, or care coordinator.      We have addressed [4] Moderate: 1 or more chronic illnesses with exacerbation,  progression, or side effects of treatment / 2 or more stable chronic illnesses / 1 undiagnosed new problem with uncertain prognosis / 1 acute illness with systemic symptoms / 1 acute complicated injury  The complexity of the data reviewed and analyzed for this visit was [3] Limited (Reviewed prior external note, ordered unique testing or reviewed the results of each unique test)   The risk of complications and/or morbidity or mortality are [4] Moderate risk (I.e. prescription drug management / decision regarding minor surgery with identified pt or procedure risk factors / decision regarding elective major surgery without identified pt or procedure risk factors / diagnosis or treatment significantly limited by social determinants of health)   The level of Medical Decision Making for this visit is [4] Moderate

## 2024-06-21 NOTE — PATIENT INSTRUCTIONS
Miguelangel Do,     If you are due for any health screening(s) below please notify me so we can arrange them to be ordered and scheduled to maintain your health. Most healthy patients complete it. Don't lose out on improving your health.     Tests to Keep You Healthy    Colon Cancer Screening: Met on 7/23/2021  Last Blood Pressure <= 139/89 (6/21/2024): NO

## 2024-06-28 ENCOUNTER — TELEPHONE (OUTPATIENT)
Dept: FAMILY MEDICINE | Facility: CLINIC | Age: 54
End: 2024-06-28
Payer: COMMERCIAL

## 2024-06-28 ENCOUNTER — HOSPITAL ENCOUNTER (OUTPATIENT)
Dept: RADIOLOGY | Facility: HOSPITAL | Age: 54
Discharge: HOME | End: 2024-06-28
Payer: COMMERCIAL

## 2024-06-28 DIAGNOSIS — Z91.89 AT RISK FOR CARDIOVASCULAR EVENT: ICD-10-CM

## 2024-06-28 NOTE — TELEPHONE ENCOUNTER
The metoprolol he is taking should be lower the heart rate. What was the heart rate? Is he taking metoprolol as prescribed?

## 2024-06-28 NOTE — TELEPHONE ENCOUNTER
Patient confirms taking Metoprolol daily as prescribed.  Patient states his pulse was 90 at the time of appointment, and he was advised pulse needed to be no more than 65. Will send follow up message to Mr. Haddad for notification.

## 2024-06-28 NOTE — TELEPHONE ENCOUNTER
Call placed to patient for notification. Patient verbalized understanding. States he would like to know if Wegovy would be an option for him for weight loss.  Patient states he would like to schedule a follow up appointment to discuss this as well as CT Cardiac Scoring testing issue.  Virtual appointment scheduled for the date of 7-5-24.  Patient agreed to virtual appointment date and time.  All scheduling questions reviewed with and answered by patient at time of call to ensure accuracy in scheduling.  Will send follow up message to Mr. Dennist for notification.

## 2024-06-28 NOTE — TELEPHONE ENCOUNTER
Pham. Unfortunately I can't really push the metoprolol to get him below 65 because it can cause him to go into heart block. As a result we can't really do the test. Recommend local cash pay options for injectable therapy ([facility] in [place] vs  family medicine in [place]). Alternatively we can try topamax but it won't likely produce the same level of  results like injectable therapy.

## 2024-06-28 NOTE — TELEPHONE ENCOUNTER
Patient reports pulse was too high to perform CT Cardiac Scoring testing.  Patient was advised to reach out to ordering provider as they were unable to order medication to lower pulse.  Please advise. Thank you.

## 2024-06-28 NOTE — TELEPHONE ENCOUNTER
Chidi Rodriguez [place] Staff     ----- Message from Chidi Rodriguez sent at 6/28/2024 11:51 AM CDT -----  Regarding: advice  Type:  Needs Medical Advice    Who Called: pt    Best Call Back Number: [phone]      Additional Information: pt st that his heart rate was to high for the ct. He wants to know what to do next.  please call to discuss.

## 2024-07-05 ENCOUNTER — OFFICE VISIT (OUTPATIENT)
Dept: FAMILY MEDICINE | Facility: CLINIC | Age: 54
End: 2024-07-05
Payer: COMMERCIAL

## 2024-07-05 DIAGNOSIS — E66.01 CLASS 3 SEVERE OBESITY WITH SERIOUS COMORBIDITY AND BODY MASS INDEX (BMI) OF 45.0 TO 49.9 IN ADULT, UNSPECIFIED OBESITY TYPE: Primary | ICD-10-CM

## 2024-07-05 PROCEDURE — 1160F RVW MEDS BY RX/DR IN RCRD: CPT | Mod: CPTII,95,, | Performed by: STUDENT IN AN ORGANIZED HEALTH CARE EDUCATION/TRAINING PROGRAM

## 2024-07-05 PROCEDURE — 4010F ACE/ARB THERAPY RXD/TAKEN: CPT | Mod: CPTII,95,, | Performed by: STUDENT IN AN ORGANIZED HEALTH CARE EDUCATION/TRAINING PROGRAM

## 2024-07-05 PROCEDURE — G2211 COMPLEX E/M VISIT ADD ON: HCPCS | Mod: 95,,, | Performed by: STUDENT IN AN ORGANIZED HEALTH CARE EDUCATION/TRAINING PROGRAM

## 2024-07-05 PROCEDURE — 99213 OFFICE O/P EST LOW 20 MIN: CPT | Mod: 95,,, | Performed by: STUDENT IN AN ORGANIZED HEALTH CARE EDUCATION/TRAINING PROGRAM

## 2024-07-05 PROCEDURE — 1159F MED LIST DOCD IN RCRD: CPT | Mod: CPTII,95,, | Performed by: STUDENT IN AN ORGANIZED HEALTH CARE EDUCATION/TRAINING PROGRAM

## 2024-07-05 RX ORDER — SEMAGLUTIDE 0.25 MG/.5ML
0.25 INJECTION, SOLUTION SUBCUTANEOUS
Qty: 6 ML | Refills: 0 | Status: SHIPPED | OUTPATIENT
Start: 2024-07-05 | End: 2024-10-03

## 2024-07-05 NOTE — PROGRESS NOTES
The patient location is: [place], [place]  The chief complaint leading to consultation is: weight management    Visit type: audiovisual    Face to Face time with patient: 7 minutes  10 minutes of total time spent on the encounter, which includes face to face time and non-face to face time preparing to see the patient (eg, review of tests), Obtaining and/or reviewing separately obtained history, Documenting clinical information in the electronic or other health record, Independently interpreting results (not separately reported) and communicating results to the patient/family/caregiver, or Care coordination (not separately reported).         Each patient to whom he or she provides medical services by telemedicine is:  (1) informed of the relationship between the physician and patient and the respective role of any other health care provider with respect to management of the patient; and (2) notified that he or she may decline to receive medical services by telemedicine and may withdraw from such care at any time.    Notes:      [facility] MEDICINE CLINIC NOTE    Patient Name: Hi Andres  YOB: 1970    PRESENTING HISTORY     History of Present Illness:  Mr. Hi Andres is a 53 y.o. male here to discuss weight management.     Currently 285+ pounds. Down from highest 312 about 2 years ago.   Current weight loss attempts using lifestyle modifications have plateud.   Intereseted in weight loss medications.     Discussed options, he is specifically interested in GLP1RA.   Discussed risks including pancreatitis, unknown thyroid malignancy risk, gastroparesis and obstruction.     ROS      OBJECTIVE:   Vital Signs:  There were no vitals filed for this visit.       Physical Exam: Normal, no change.     Physical Exam    ASSESSMENT & PLAN:     Class 3 severe obesity with serious comorbidity and body mass index (BMI) of 45.0 to 49.9 in adult, unspecified obesity type  -     semaglutide,  weight loss, (WEGOVY) 0.25 mg/0.5 mL PnIj; Inject 0.25 mg into the skin every 7 days.  Dispense: 6 mL; Refill: 0      Johan Chamberlain  Internal Medicine    Visit complexity inherent to evaluation and management associated with medical care services that serve as the continuing focal point for all needed health care services and/or with medical care services that are part of ongoing care related to a patient's single, serious condition or a complex condition provided today

## 2024-07-08 PROBLEM — I73.9 CLAUDICATION OF BOTH LOWER EXTREMITIES: Status: RESOLVED | Noted: 2022-12-09 | Resolved: 2024-07-08

## 2024-07-15 DIAGNOSIS — E79.0 ELEVATED URIC ACID IN BLOOD: ICD-10-CM

## 2024-07-15 RX ORDER — ALLOPURINOL 300 MG/1
TABLET ORAL
Qty: 90 TABLET | Refills: 3 | Status: SHIPPED | OUTPATIENT
Start: 2024-07-15

## 2024-07-15 NOTE — TELEPHONE ENCOUNTER
Refill Routing Note   Medication(s) are not appropriate for processing by [facility] for the following reason(s):        Required labs outdated    [facility] action(s):  Defer     Requires labs : Yes             Appointments  past 12m or future 3m with PCP    Date Provider   Last Visit   7/5/2024 Johan Chamberlain MD   Next Visit   Visit date not found Johan Chamberlain MD   ED visits in past 90 days: 0        Note composed:2:26 AM 07/15/2024

## 2024-07-15 NOTE — TELEPHONE ENCOUNTER
Care Due:                  Date            Visit Type   Department     Provider  --------------------------------------------------------------------------------                                ESTABLISHED                              PATIENT -    SALVATORE Myers  Last Visit: 07-      VIRTUAL      MEDICINE       [facility]                               -                              PRIMARY      SALVATORE Myers  Next Visit: 02-      CARE ([facility])   MEDICINE       [facility]                                                            Last  Test          Frequency    Reason                     Performed    Due Date  --------------------------------------------------------------------------------    CBC.........  12 months..  allopurinoL..............  09- 09-    HBA1C.......  6 months...  semaglutide,.............  09- 03-    [facility] Embedded Care Due Messages. Reference number: [id number].   7/15/2024 12:04:19 AM CDT

## 2024-08-09 ENCOUNTER — TELEPHONE (OUTPATIENT)
Dept: FAMILY MEDICINE | Facility: CLINIC | Age: 54
End: 2024-08-09
Payer: COMMERCIAL

## 2024-09-23 RX ORDER — ERGOCALCIFEROL 1.25 MG/1
CAPSULE ORAL
Qty: 12 CAPSULE | Refills: 3 | Status: SHIPPED | OUTPATIENT
Start: 2024-09-23

## 2024-12-26 ENCOUNTER — TELEPHONE (OUTPATIENT)
Dept: FAMILY MEDICINE | Facility: CLINIC | Age: 54
End: 2024-12-26
Payer: COMMERCIAL

## 2024-12-26 DIAGNOSIS — E66.01 CLASS 3 SEVERE OBESITY WITH SERIOUS COMORBIDITY AND BODY MASS INDEX (BMI) OF 45.0 TO 49.9 IN ADULT, UNSPECIFIED OBESITY TYPE: Primary | ICD-10-CM

## 2024-12-26 DIAGNOSIS — E66.813 CLASS 3 SEVERE OBESITY WITH SERIOUS COMORBIDITY AND BODY MASS INDEX (BMI) OF 45.0 TO 49.9 IN ADULT, UNSPECIFIED OBESITY TYPE: Primary | ICD-10-CM

## 2024-12-26 RX ORDER — TIRZEPATIDE 2.5 MG/.5ML
2.5 INJECTION, SOLUTION SUBCUTANEOUS
Qty: 2 ML | Refills: 0 | Status: SHIPPED | OUTPATIENT
Start: 2024-12-26 | End: 2024-12-26 | Stop reason: SDUPTHER

## 2024-12-26 RX ORDER — TIRZEPATIDE 7.5 MG/.5ML
7.5 INJECTION, SOLUTION SUBCUTANEOUS
Qty: 2 ML | Refills: 0 | Status: SHIPPED | OUTPATIENT
Start: 2025-02-26 | End: 2025-03-28

## 2024-12-26 RX ORDER — TIRZEPATIDE 7.5 MG/.5ML
7.5 INJECTION, SOLUTION SUBCUTANEOUS
Qty: 2 ML | Refills: 0 | Status: SHIPPED | OUTPATIENT
Start: 2025-02-26 | End: 2024-12-26 | Stop reason: SDUPTHER

## 2024-12-26 RX ORDER — TIRZEPATIDE 2.5 MG/.5ML
2.5 INJECTION, SOLUTION SUBCUTANEOUS
Qty: 2 ML | Refills: 0 | Status: SHIPPED | OUTPATIENT
Start: 2024-12-26 | End: 2025-01-25

## 2024-12-26 RX ORDER — TIRZEPATIDE 5 MG/.5ML
5 INJECTION, SOLUTION SUBCUTANEOUS
Qty: 2 ML | Refills: 0 | Status: SHIPPED | OUTPATIENT
Start: 2025-01-26 | End: 2024-12-26 | Stop reason: SDUPTHER

## 2024-12-26 RX ORDER — TIRZEPATIDE 5 MG/.5ML
5 INJECTION, SOLUTION SUBCUTANEOUS
Qty: 2 ML | Refills: 0 | Status: SHIPPED | OUTPATIENT
Start: 2025-01-26 | End: 2025-02-25

## 2024-12-26 NOTE — TELEPHONE ENCOUNTER
Please send to [facility] as patient wishes to use coupon towards cost of medication. Thank you.    Spoke to mother and father.  Confirmed that only 4 capsules for vitamin D were dispensed from pharmacy.  Notified order is for 6 and parent will need to call pharmacy to  remainder of their medication.  Notified will need to repeat labs once vitamin D course is complete (November 2024). BMP and Vit D 25 ordered.  Scheduled follow up visit.  Mother and father state understanding and has no further questions.  VNS 10.15.24

## 2024-12-26 NOTE — TELEPHONE ENCOUNTER
Valerie Malhotra Staff     ----- Message from Valerie sent at 12/26/2024  1:53 PM CST -----  Regarding: advise  Contact: patient  Type: Needs Medical Advice  Who Called:  patient   Symptoms (please be specific):    How long has patient had these symptoms:    Pharmacy name and phone #:      [id number] - [place] - [address]  [address] [place] [address]  Phone: [phone] Fax: [phone]    OR      [facility] - [address] [address]  Phone: [phone] Fax: [phone]  [facility]   Best Call Back Number: [phone]    Additional Information: seeking a prescription for zepbound call to advise

## 2024-12-26 NOTE — TELEPHONE ENCOUNTER
Patient states previously ordered medication for weighl oss was not covered by insurance.  Patient states he would like prescription for Zepbound, states he plans to use a coupon to assist with the cost of this medication.  Please advise. Thank you.

## 2024-12-30 ENCOUNTER — TELEPHONE (OUTPATIENT)
Dept: FAMILY MEDICINE | Facility: CLINIC | Age: 54
End: 2024-12-30
Payer: COMMERCIAL

## 2024-12-30 DIAGNOSIS — E66.01 CLASS 3 SEVERE OBESITY WITH SERIOUS COMORBIDITY AND BODY MASS INDEX (BMI) OF 45.0 TO 49.9 IN ADULT, UNSPECIFIED OBESITY TYPE: ICD-10-CM

## 2024-12-30 DIAGNOSIS — E66.813 CLASS 3 SEVERE OBESITY WITH SERIOUS COMORBIDITY AND BODY MASS INDEX (BMI) OF 45.0 TO 49.9 IN ADULT, UNSPECIFIED OBESITY TYPE: ICD-10-CM

## 2024-12-30 NOTE — TELEPHONE ENCOUNTER
----- Message from Bisi sent at 12/30/2024  1:21 PM CST -----  Type:  RX Refill Request    Who Called: PT    Refill or New Rx:new     RX Name and Strength: Disp Refills Start End   tirzepatide, weight loss, (ZEPBOUND) 7.5 mg/0.5 mL PnIj         Preferred Pharmacy with phone number:    [id number] - [place] - [address]  [address] [place] [address]  Phone: [phone] Fax: [phone]    Local or Mail Order:local    Ordering Provider:    Would the patient rather a call back or a response via [facility]? call    Best Call Back Number:[phone]       Additional Information:  Please call back to advise. Thank you!

## 2024-12-30 NOTE — TELEPHONE ENCOUNTER
----- Message from Bisi sent at 12/30/2024  1:21 PM CST -----  Type:  RX Refill Request    Who Called: PT    Refill or New Rx:new     RX Name and Strength: Disp Refills Start End   tirzepatide, weight loss, (ZEPBOUND) 7.5 mg/0.5 mL PnIj         Preferred Pharmacy with phone number:    [facility]/pharmacy #[id number] - [place] - [address]  [address] [place] [address]  Phone: [phone] Fax: [phone]    Local or Mail Order:local    Ordering Provider:    Would the patient rather a call back or a response via [facility]? call    Best Call Back Number:[phone]       Additional Information:  Please call back to advise. Thank you!

## 2024-12-30 NOTE — TELEPHONE ENCOUNTER
Care Due:                  Date            Visit Type   Department     Provider  --------------------------------------------------------------------------------                                ESTABLISHED                              PATIENT -    SALVATORE Myers  Last Visit: 07-      VIRTUAL      MEDICINE       [facility]                               -                              PRIMARY      [facility] Johan [facility]  Next Visit: 02-      CARE ([facility])   MEDICINE       [facility]                                                            Last  Test          Frequency    Reason                     Performed    Due Date  --------------------------------------------------------------------------------    CBC.........  12 months..  allopurinoL..............  Not Found    Overdue    CMP.........  12 months..  allopurinoL,               02- 02-                             hydroCHLOROthiazide,                             valsartan................    HBA1C.......  6 months...  tirzepatide,.............  09- 03-    Uric Acid...  12 months..  allopurinoL..............  02- 02-    [facility] Embedded Care Due Messages. Reference number: [id number].   12/30/2024 1:49:12 PM CST

## 2024-12-31 RX ORDER — TIRZEPATIDE 7.5 MG/.5ML
7.5 INJECTION, SOLUTION SUBCUTANEOUS
Qty: 6 ML | Refills: 4 | Status: SHIPPED | OUTPATIENT
Start: 2025-02-26 | End: 2026-04-22

## 2024-12-31 RX ORDER — TIRZEPATIDE 5 MG/.5ML
5 INJECTION, SOLUTION SUBCUTANEOUS
Qty: 2 ML | Refills: 0 | OUTPATIENT
Start: 2025-01-26 | End: 2025-02-25

## 2025-01-17 ENCOUNTER — OFFICE VISIT (OUTPATIENT)
Dept: FAMILY MEDICINE | Facility: CLINIC | Age: 55
End: 2025-01-17
Payer: COMMERCIAL

## 2025-01-17 VITALS
DIASTOLIC BLOOD PRESSURE: 70 MMHG | OXYGEN SATURATION: 98 % | RESPIRATION RATE: 16 BRPM | HEIGHT: 65 IN | HEART RATE: 74 BPM | BODY MASS INDEX: 49.26 KG/M2 | WEIGHT: 295.63 LBS | SYSTOLIC BLOOD PRESSURE: 122 MMHG

## 2025-01-17 DIAGNOSIS — E66.813 CLASS 3 SEVERE OBESITY DUE TO EXCESS CALORIES WITH SERIOUS COMORBIDITY AND BODY MASS INDEX (BMI) OF 50.0 TO 59.9 IN ADULT: ICD-10-CM

## 2025-01-17 DIAGNOSIS — E66.01 CLASS 3 SEVERE OBESITY DUE TO EXCESS CALORIES WITH SERIOUS COMORBIDITY AND BODY MASS INDEX (BMI) OF 50.0 TO 59.9 IN ADULT: ICD-10-CM

## 2025-01-17 DIAGNOSIS — G47.33 OSA ON CPAP: ICD-10-CM

## 2025-01-17 DIAGNOSIS — I87.2 VENOUS STASIS DERMATITIS OF BOTH LOWER EXTREMITIES: ICD-10-CM

## 2025-01-17 DIAGNOSIS — R21 RASH: ICD-10-CM

## 2025-01-17 DIAGNOSIS — E78.2 MIXED HYPERLIPIDEMIA: ICD-10-CM

## 2025-01-17 DIAGNOSIS — Z12.5 ENCOUNTER FOR PROSTATE CANCER SCREENING: ICD-10-CM

## 2025-01-17 DIAGNOSIS — Z00.00 ROUTINE GENERAL MEDICAL EXAMINATION AT A HEALTH CARE FACILITY: Primary | ICD-10-CM

## 2025-01-17 DIAGNOSIS — I10 PRIMARY HYPERTENSION: ICD-10-CM

## 2025-01-17 DIAGNOSIS — M10.9 GOUT, UNSPECIFIED CAUSE, UNSPECIFIED CHRONICITY, UNSPECIFIED SITE: ICD-10-CM

## 2025-01-17 PROCEDURE — 3008F BODY MASS INDEX DOCD: CPT | Mod: CPTII,S$GLB,, | Performed by: PHYSICIAN ASSISTANT

## 2025-01-17 PROCEDURE — 99396 PREV VISIT EST AGE 40-64: CPT | Mod: S$GLB,,, | Performed by: PHYSICIAN ASSISTANT

## 2025-01-17 PROCEDURE — 1159F MED LIST DOCD IN RCRD: CPT | Mod: CPTII,S$GLB,, | Performed by: PHYSICIAN ASSISTANT

## 2025-01-17 PROCEDURE — 99999 PR PBB SHADOW E&M-EST. PATIENT-LVL IV: CPT | Mod: PBBFAC,,, | Performed by: PHYSICIAN ASSISTANT

## 2025-01-17 PROCEDURE — 3074F SYST BP LT 130 MM HG: CPT | Mod: CPTII,S$GLB,, | Performed by: PHYSICIAN ASSISTANT

## 2025-01-17 PROCEDURE — 3078F DIAST BP <80 MM HG: CPT | Mod: CPTII,S$GLB,, | Performed by: PHYSICIAN ASSISTANT

## 2025-01-17 NOTE — ASSESSMENT & PLAN NOTE
Blood pressure stable 122/70. Continue current medications, amlodipine 10 mg daily, metoprolol succinate 100 mg daily, hydrochlorothiazide 12.5 mg daily, valsartan 320 mg daily.

## 2025-01-17 NOTE — PROGRESS NOTES
OFFICE VISIT   1/17/2025    Patient ID: Hi Andres is a 54 y.o. male    SUBJECTIVE:  No chief complaint on file.      HPI:  Patient presents for annual exam and follow up of multiple medical problems. Patient is in a usual state of health today. He is up to date on healthcare maintenance.   Patient does have concern for right sided low back pain that first began about one week ago however has already continued to resolve since resuming regular exercise. Tylenol/Aleve as needed had initially helped with the pain. He denies sciatica, bowel or bladder changes.   Patient also has concern for a rash to bilateral lower extremities that first began a few months ago. It is non-tender and mildly itchy at times, it has not spread past his knees. He has used neosporin at times. He is establish with a dermatologist.     Right sided SI joint area pain onset 1 week ago, has improved since started going to the gym, tylenol/aleve helped  Pain is resolving gradually, usually most bothersome int he morning, movement helps  No sciatica  Skin lesions first noticed a few months ago  Sometimes itch, have not spread  Established with dermatologist      Past Medical History:   Diagnosis Date    Anemia     Arthritis     Broken foot     Left side (fell down a ladder)    Gout     Hernia, abdominal     Hyperlipidemia     Hypertension     EVGENY on CPAP     Vitamin D deficiency        Social History     Tobacco Use    Smoking status: Never    Smokeless tobacco: Never   Substance Use Topics    Alcohol use: Yes     Comment: monthly    Drug use: No       Past Surgical History:   Procedure Laterality Date    COLONOSCOPY N/A 7/23/2021    Procedure: COLONOSCOPY;  Surgeon: Emily Valente MD;  Location: [facility];  Service: Endoscopy;  Laterality: N/A;    HERNIA REPAIR         Review of Systems   Constitutional:  Negative for activity change, chills, fatigue and fever.   HENT:  Negative for congestion and sore throat.    Eyes: Negative.   "  Respiratory:  Negative for cough and shortness of breath.    Cardiovascular:  Negative for chest pain, palpitations and leg swelling.   Gastrointestinal:  Negative for constipation, diarrhea, nausea and vomiting.   Genitourinary: Negative.    Musculoskeletal:  Positive for back pain (improving). Negative for myalgias.   Skin:  Positive for rash.   Neurological:  Negative for dizziness, weakness, light-headedness and numbness.   Psychiatric/Behavioral:  Negative for sleep disturbance. The patient is not nervous/anxious.        OBJECTIVE:    /70 (BP Location: Right arm, Patient Position: Sitting)   Pulse 74   Resp 16   Ht 5' 5" (1.651 m)   Wt 134.1 kg (295 lb 10.2 oz)   SpO2 98%   BMI 49.20 kg/m²       Current Outpatient Medications:     allopurinoL (ZYLOPRIM) 300 MG tablet, TAKE 1 TABLET DAILY, Disp: 90 tablet, Rfl: 3    amLODIPine (NORVASC) 10 MG tablet, Take 10 mg by mouth once daily., Disp: , Rfl:     ascorbic acid, vitamin C, (VITAMIN C) 1000 MG tablet, Take 1,000 mg by mouth once daily., Disp: , Rfl:     aspirin (ECOTRIN) 81 MG EC tablet, Take 81 mg by mouth once daily., Disp: , Rfl:     ergocalciferol (ERGOCALCIFEROL) 50,000 unit Cap, TAKE 1 CAPSULE EVERY 7 DAYS, Disp: 12 capsule, Rfl: 3    hydroCHLOROthiazide (HYDRODIURIL) 12.5 MG Tab, TAKE 1 TABLET DAILY, Disp: 90 tablet, Rfl: 3    metoprolol succinate (TOPROL-XL) 100 MG 24 hr tablet, TAKE 1 TABLET DAILY, Disp: 90 tablet, Rfl: 3    multivitamin (MULTIPLE VITAMINS ORAL), Take 1 tablet by mouth once daily., Disp: , Rfl:     omega-3 fatty acids-fish oil 435-880 mg Cap, Take 1 capsule by mouth once daily., Disp: , Rfl:     tadalafiL (CIALIS) 5 MG tablet, Take 1 tablet (5 mg total) by mouth once daily., Disp: 30 tablet, Rfl: 3    valsartan (DIOVAN) 320 MG tablet, TAKE 1 TABLET DAILY WITH FOOD, Disp: 90 tablet, Rfl: 3    ammonium lactate (LAC-HYDRIN) 12 % lotion, Apply topically as needed for Dry Skin. (Patient not taking: Reported on 1/17/2025), " Disp: , Rfl:     ciclopirox (PENLAC) 8 % Soln, ?Apply nail lacquer 8% solution once daily to affected nails with applicator brush, preferably at bedtime or 8 hours before washing; remove with alcohol every 7 days (Patient not taking: Reported on 1/17/2025), Disp: 6.6 mL, Rfl: 2    Physical Exam  Constitutional:       General: He is not in acute distress.     Appearance: Normal appearance. He is not ill-appearing.   HENT:      Head: Normocephalic and atraumatic.      Right Ear: External ear normal.      Left Ear: External ear normal.      Nose: Nose normal.      Mouth/Throat:      Mouth: Mucous membranes are moist.      Pharynx: Oropharynx is clear.   Eyes:      Extraocular Movements: Extraocular movements intact.      Conjunctiva/sclera: Conjunctivae normal.   Cardiovascular:      Rate and Rhythm: Normal rate and regular rhythm.      Pulses: Normal pulses.      Heart sounds: Normal heart sounds.      Comments: Lower extremity venous stasis changes noted bilaterally.  Pulmonary:      Effort: Pulmonary effort is normal. No respiratory distress.      Breath sounds: Normal breath sounds.   Abdominal:      General: Abdomen is flat.      Palpations: Abdomen is soft.   Musculoskeletal:         General: No swelling or deformity. Normal range of motion.      Cervical back: Normal range of motion and neck supple.      Lumbar back: Normal. No tenderness. Normal range of motion.      Right lower leg: No edema.      Left lower leg: No edema.   Skin:     General: Skin is warm and dry.      Comments: Multiple scattered vesicular lesions localized to the bilateral lower extremities located between the ankles and knees. There is outer scaling that is pale in color with centralized erythema. No drainage, tenderness, bleeding, or nodules located beneath.   Neurological:      General: No focal deficit present.      Mental Status: He is alert and oriented to person, place, and time. Mental status is at baseline.   Psychiatric:          Mood and Affect: Mood normal.         Behavior: Behavior normal.         Thought Content: Thought content normal.         Judgment: Judgment normal.         Assessment/Plan:    Problem List Items Addressed This Visit          Derm    Rash     Patient with new onset rash to bilateral lower extremities located between the ankles and knees. He first noticed about one month ago. He denies any other locations of lesions. Denies pain, they do occasionally itch but is not extremely bothersome. Patient does have a dermatologist who he will contact for an appointment. If appointment is far out will attempt to coordinate appointment with different dermatologist, patient may likely need biopsy of lesions to characterize.            Cardiac/Vascular    Hypertension     Blood pressure stable 122/70. Continue current medications, amlodipine 10 mg daily, metoprolol succinate 100 mg daily, hydrochlorothiazide 12.5 mg daily, valsartan 320 mg daily.         Relevant Orders    COMPREHENSIVE METABOLIC PANEL    Hyperlipidemia     Monitoring lipid panel.         Relevant Orders    LIPID PANEL    Venous stasis dermatitis of both lower extremities     Chronic, noted on physical exam.  Patient establish with Cardiology in the past for this.  He states he does use compression stockings daily, limit sodium intake, and elevates the legs when able.            Endocrine    Class 3 severe obesity with serious comorbidity and body mass index (BMI) of 50.0 to 59.9 in adult     Patient has tried to obtain GLP1 RA medication past however has not been able to start medication due to high cost.  Patient has recently started exercising at the gym recently and plans to continue this.  Educated patient on dietary measures as well. He may seek other resources for GLP1 medication, does not have any known contraindications for use.             Orthopedic    Gout     Monitoring uric acid level.         Relevant Orders    URIC ACID       Other    EVGENY on CPAP      Compliant with CPAP use nightly.         Routine general medical examination at a health care facility - Primary    Relevant Orders    URIC ACID    COMPREHENSIVE METABOLIC PANEL    LIPID PANEL    PSA, Screening     Other Visit Diagnoses       Encounter for prostate cancer screening        Relevant Orders    PSA, Screening            Patient verbalizes understanding of instructions and healthcare topics reviewed. All of patient's questions were answered.  Patient encouraged to contact office if other questions arise.    Health Maintenance Due   Topic Date Due    Pneumococcal Vaccines (Age 50+) (1 of 1 - PCV) Never done    Influenza Vaccine (1) 09/01/2024    COVID-19 Vaccine (3 - 2024-25 season) 09/01/2024       Follow up in about 6 months (around 7/17/2025), or sooner if symptoms worsen or other concerns arise.    Chantel Whitney PA-C  Family Medicine Physician Assistant

## 2025-01-17 NOTE — ASSESSMENT & PLAN NOTE
Patient has tried to obtain GLP1 RA medication past however has not been able to start medication due to high cost.  Patient has recently started exercising at the gym recently and plans to continue this.  Educated patient on dietary measures as well. He may seek other resources for GLP1 medication, does not have any known contraindications for use.

## 2025-01-17 NOTE — ASSESSMENT & PLAN NOTE
Chronic, noted on physical exam.  Patient establish with Cardiology in the past for this.  He states he does use compression stockings daily, limit sodium intake, and elevates the legs when able.

## 2025-01-17 NOTE — ASSESSMENT & PLAN NOTE
Patient with new onset rash to bilateral lower extremities located between the ankles and knees. He first noticed about one month ago. He denies any other locations of lesions. Denies pain, they do occasionally itch but is not extremely bothersome. Patient does have a dermatologist who he will contact for an appointment. If appointment is far out will attempt to coordinate appointment with different dermatologist, patient may likely need biopsy of lesions to characterize.

## 2025-02-05 ENCOUNTER — LAB VISIT (OUTPATIENT)
Dept: LAB | Facility: HOSPITAL | Age: 55
End: 2025-02-05
Attending: PHYSICIAN ASSISTANT
Payer: COMMERCIAL

## 2025-02-05 DIAGNOSIS — M10.9 GOUT, UNSPECIFIED CAUSE, UNSPECIFIED CHRONICITY, UNSPECIFIED SITE: ICD-10-CM

## 2025-02-05 DIAGNOSIS — Z12.5 ENCOUNTER FOR PROSTATE CANCER SCREENING: ICD-10-CM

## 2025-02-05 DIAGNOSIS — E78.2 MIXED HYPERLIPIDEMIA: ICD-10-CM

## 2025-02-05 DIAGNOSIS — Z00.00 ROUTINE GENERAL MEDICAL EXAMINATION AT A HEALTH CARE FACILITY: ICD-10-CM

## 2025-02-05 DIAGNOSIS — I10 PRIMARY HYPERTENSION: ICD-10-CM

## 2025-02-05 LAB
ALBUMIN SERPL BCP-MCNC: 3.8 G/DL (ref 3.5–5.2)
ALP SERPL-CCNC: 101 U/L (ref 40–150)
ALT SERPL W/O P-5'-P-CCNC: 20 U/L (ref 10–44)
ANION GAP SERPL CALC-SCNC: 9 MMOL/L (ref 8–16)
AST SERPL-CCNC: 22 U/L (ref 10–40)
BILIRUB SERPL-MCNC: 0.9 MG/DL (ref 0.1–1)
BUN SERPL-MCNC: 15 MG/DL (ref 6–20)
CALCIUM SERPL-MCNC: 9.1 MG/DL (ref 8.7–10.5)
CHLORIDE SERPL-SCNC: 103 MMOL/L (ref 95–110)
CHOLEST SERPL-MCNC: 193 MG/DL (ref 120–199)
CHOLEST/HDLC SERPL: 5.8 {RATIO} (ref 2–5)
CO2 SERPL-SCNC: 26 MMOL/L (ref 23–29)
COMPLEXED PSA SERPL-MCNC: 0.52 NG/ML (ref 0–4)
CREAT SERPL-MCNC: 1 MG/DL (ref 0.5–1.4)
EST. GFR  (NO RACE VARIABLE): >60 ML/MIN/1.73 M^2
GLUCOSE SERPL-MCNC: 80 MG/DL (ref 70–110)
HDLC SERPL-MCNC: 33 MG/DL (ref 40–75)
HDLC SERPL: 17.1 % (ref 20–50)
LDLC SERPL CALC-MCNC: 134.8 MG/DL (ref 63–159)
NONHDLC SERPL-MCNC: 160 MG/DL
POTASSIUM SERPL-SCNC: 3.1 MMOL/L (ref 3.5–5.1)
PROT SERPL-MCNC: 7.8 G/DL (ref 6–8.4)
SODIUM SERPL-SCNC: 138 MMOL/L (ref 136–145)
TRIGL SERPL-MCNC: 126 MG/DL (ref 30–150)
URATE SERPL-MCNC: 4.4 MG/DL (ref 3.4–7)

## 2025-02-05 PROCEDURE — 84550 ASSAY OF BLOOD/URIC ACID: CPT | Performed by: PHYSICIAN ASSISTANT

## 2025-02-05 PROCEDURE — 80061 LIPID PANEL: CPT | Performed by: PHYSICIAN ASSISTANT

## 2025-02-05 PROCEDURE — 36415 COLL VENOUS BLD VENIPUNCTURE: CPT | Performed by: PHYSICIAN ASSISTANT

## 2025-02-05 PROCEDURE — 80053 COMPREHEN METABOLIC PANEL: CPT | Performed by: PHYSICIAN ASSISTANT

## 2025-02-05 PROCEDURE — 84153 ASSAY OF PSA TOTAL: CPT | Performed by: PHYSICIAN ASSISTANT

## 2025-02-06 DIAGNOSIS — E87.6 HYPOKALEMIA: Primary | ICD-10-CM

## 2025-02-06 RX ORDER — POTASSIUM CHLORIDE 20 MEQ/1
20 TABLET, EXTENDED RELEASE ORAL DAILY
Qty: 90 TABLET | Refills: 1 | Status: SHIPPED | OUTPATIENT
Start: 2025-02-06

## 2025-02-28 DIAGNOSIS — N52.9 ERECTILE DYSFUNCTION, UNSPECIFIED ERECTILE DYSFUNCTION TYPE: ICD-10-CM

## 2025-02-28 RX ORDER — TADALAFIL 5 MG/1
5 TABLET ORAL
Qty: 10 TABLET | Refills: 5 | Status: SHIPPED | OUTPATIENT
Start: 2025-02-28

## 2025-02-28 NOTE — TELEPHONE ENCOUNTER
Refill Decision Note   Hi Andres  is requesting a refill authorization.  Brief Assessment and Rationale for Refill:  Approve     Medication Therapy Plan:        Comments:     Note composed:8:21 AM 02/28/2025

## 2025-02-28 NOTE — TELEPHONE ENCOUNTER
No care due was identified.  [facility] Embedded Care Due Messages. Reference number: [id number].   2/28/2025 12:27:51 AM CST

## 2025-03-17 DIAGNOSIS — I10 ESSENTIAL HYPERTENSION: ICD-10-CM

## 2025-03-17 RX ORDER — VALSARTAN 320 MG/1
320 TABLET ORAL
Qty: 90 TABLET | Refills: 1 | Status: SHIPPED | OUTPATIENT
Start: 2025-03-17

## 2025-03-17 NOTE — TELEPHONE ENCOUNTER
Care Due:                  Date            Visit Type   Department     Provider  --------------------------------------------------------------------------------                                ESTABLISHED                              PATIENT -    SALVATORE Myers  Last Visit: 07-      VIRTUAL      MEDICINE       [place]                               -                              PRIMARY      [facility] Johan Myers  Next Visit: 04-      CARE ([facility])   MEDICINE       [facility] [place]                                                            Last  Test          Frequency    Reason                     Performed    Due Date  --------------------------------------------------------------------------------    CBC.........  12 months..  allopurinoL..............  Not Found    Overdue    [employer] Embedded Care Due Messages. Reference number: [id number].   3/17/2025 12:29:14 AM CDT

## 2025-03-17 NOTE — TELEPHONE ENCOUNTER
Refill Routing Note   Medication(s) are not appropriate for processing by [facility] for the following reason(s):        Required labs abnormal    [facility] action(s):  Defer  Approve     Requires labs : Yes             Appointments  past 12m or future 3m with PCP    Date Provider   Last Visit   7/5/2024 Johan Chamberlain MD   Next Visit   4/3/2025 Johan Chamberlain MD   ED visits in past 90 days: 0        Note composed:4:01 PM 03/17/2025

## 2025-03-18 RX ORDER — HYDROCHLOROTHIAZIDE 12.5 MG/1
12.5 TABLET ORAL
Qty: 90 TABLET | Refills: 1 | Status: SHIPPED | OUTPATIENT
Start: 2025-03-18

## 2025-04-23 DIAGNOSIS — I10 ESSENTIAL HYPERTENSION: ICD-10-CM

## 2025-04-23 RX ORDER — METOPROLOL SUCCINATE 100 MG/1
100 TABLET, EXTENDED RELEASE ORAL
Qty: 90 TABLET | Refills: 0 | Status: SHIPPED | OUTPATIENT
Start: 2025-04-23

## 2025-04-23 NOTE — TELEPHONE ENCOUNTER
No care due was identified.  [facility] Embedded Care Due Messages. Reference number: [id number].   4/23/2025 12:03:26 AM CDT

## 2025-04-23 NOTE — TELEPHONE ENCOUNTER
Refill Decision Note   Hi Andres  is requesting a refill authorization.  Brief Assessment and Rationale for Refill:  Approve     Medication Therapy Plan:         Comments:     Note composed:5:41 AM 04/23/2025

## 2025-07-08 DIAGNOSIS — E79.0 ELEVATED URIC ACID IN BLOOD: ICD-10-CM

## 2025-07-08 RX ORDER — ALLOPURINOL 300 MG/1
300 TABLET ORAL
Qty: 90 TABLET | Refills: 4 | Status: SHIPPED | OUTPATIENT
Start: 2025-07-08

## 2025-07-08 NOTE — TELEPHONE ENCOUNTER
Care Due:                  Date            Visit Type   Department     Provider  --------------------------------------------------------------------------------                                ESTABLISHED                              PATIENT -    [facility]    Johan Myers  Last Visit: 07-      [facility]  Next Visit: None Scheduled  None         None Found                                                            Last  Test          Frequency    Reason                     Performed    Due Date  --------------------------------------------------------------------------------    Office Visit  15 months..  allopurinoL,               07- 09-                             hydroCHLOROthiazide,                             metoprolol, tadalafiL,                             valsartan................    CBC.........  12 months..  allopurinoL..............  Not Found    Overdue    [employer] Embedded Care Due Messages. Reference number: [id number].   7/08/2025 12:06:33 AM CDT

## 2025-07-08 NOTE — TELEPHONE ENCOUNTER
Refill Routing Note   Medication(s) are not appropriate for processing by [facility] for the following reason(s):        Required labs outdated    [facility] action(s):  Defer   Requires labs : Yes     Requires appointment : Yes             Appointments  past 12m or future 3m with PCP    Date Provider   Last Visit   7/5/2024 Johan Chamberlain MD   Next Visit   Visit date not found Johan Chamberlain MD   ED visits in past 90 days: 0        Note composed:1:21 PM 07/08/2025

## 2025-07-22 DIAGNOSIS — I10 ESSENTIAL HYPERTENSION: ICD-10-CM

## 2025-07-22 RX ORDER — METOPROLOL SUCCINATE 100 MG/1
100 TABLET, EXTENDED RELEASE ORAL
Qty: 90 TABLET | Refills: 1 | Status: SHIPPED | OUTPATIENT
Start: 2025-07-22

## 2025-07-22 NOTE — TELEPHONE ENCOUNTER
No care due was identified.  [facility] Embedded Care Due Messages. Reference number: [id number].   7/22/2025 12:04:33 AM CDT

## 2025-07-22 NOTE — TELEPHONE ENCOUNTER
Refill Decision Note   Hi Priesttiste  is requesting a refill authorization.  Brief Assessment and Rationale for Refill:  Approve     Medication Therapy Plan:  LOV 1/17/25 with the HERMILA ARMIDA Whitney PA-C. CARE team workflow applies. refills based on LOV 1/17/25      Comments:     Note composed:12:05 PM 07/22/2025